# Patient Record
Sex: MALE | Race: WHITE
[De-identification: names, ages, dates, MRNs, and addresses within clinical notes are randomized per-mention and may not be internally consistent; named-entity substitution may affect disease eponyms.]

---

## 2017-01-09 ENCOUNTER — HOSPITAL ENCOUNTER (OUTPATIENT)
Dept: HOSPITAL 45 - C.LABSPEC | Age: 82
Discharge: HOME | End: 2017-01-09
Attending: INTERNAL MEDICINE
Payer: COMMERCIAL

## 2017-01-09 DIAGNOSIS — M25.469: Primary | ICD-10-CM

## 2017-01-09 LAB
APPEARANCE SNV: (no result)
COLOR SNV: (no result)
SYNOVIAL FLUID MONONUC RELAT: 8.1 %
SYNOVIAL FLUID POLYNUC RELAT: 91.9 %

## 2017-01-12 ENCOUNTER — HOSPITAL ENCOUNTER (OUTPATIENT)
Dept: HOSPITAL 45 - C.LAB1850 | Age: 82
Discharge: HOME | End: 2017-01-12
Attending: INTERNAL MEDICINE
Payer: COMMERCIAL

## 2017-01-12 DIAGNOSIS — N18.4: Primary | ICD-10-CM

## 2017-01-12 LAB
ANION GAP SERPL CALC-SCNC: 10 MMOL/L (ref 3–11)
APPEARANCE UR: CLEAR
B-OH-BUTYR SERPL-SCNC: 1.49 MG/DL (ref 0.2–2.81)
BILIRUB UR-MCNC: (no result) MG/DL
BUN SERPL-MCNC: 32 MG/DL (ref 7–18)
BUN/CREAT SERPL: 14.7 (ref 10–20)
CALCIUM SERPL-MCNC: 9.1 MG/DL (ref 8.5–10.1)
CHLORIDE SERPL-SCNC: 106 MMOL/L (ref 98–107)
CO2 SERPL-SCNC: 24 MMOL/L (ref 21–32)
COLOR UR: YELLOW
CREAT SERPL-MCNC: 2.2 MG/DL (ref 0.6–1.4)
CREAT UR-MCNC: 140 MG/DL
GLUCOSE SERPL-MCNC: 310 MG/DL (ref 70–99)
MAGNESIUM SERPL-MCNC: 1.9 MG/DL (ref 1.8–2.4)
MANUAL MICROSCOPIC REQUIRED?: NO
NITRITE UR QL STRIP: (no result)
PH UR STRIP: 5 [PH] (ref 4.5–7.5)
PHOSPHATE SERPL-MCNC: 3.4 MG/DL (ref 2.5–4.9)
POTASSIUM SERPL-SCNC: 4.5 MMOL/L (ref 3.5–5.1)
PROT UR STRIP-MCNC: 76.5 MG/DL (ref 0–11.9)
REVIEW REQ?: NO
SODIUM SERPL-SCNC: 140 MMOL/L (ref 136–145)
SP GR UR STRIP: 1.02 (ref 1–1.03)
URINE EPITHELIAL CELL AUTO: (no result) /LPF (ref 0–5)
URINE PROTIEN/CREAT RATIO: 0.6 (ref 0–0.2)
UROBILINOGEN UR-MCNC: (no result) MG/DL
ZZUR CULT IF INDIC CLEAN CATCH: NO

## 2017-01-20 ENCOUNTER — HOSPITAL ENCOUNTER (INPATIENT)
Dept: HOSPITAL 45 - C.EDB | Age: 82
LOS: 6 days | Discharge: HOME | DRG: 558 | End: 2017-01-26
Attending: FAMILY MEDICINE | Admitting: HOSPITALIST
Payer: COMMERCIAL

## 2017-01-20 VITALS — OXYGEN SATURATION: 93 %

## 2017-01-20 VITALS
SYSTOLIC BLOOD PRESSURE: 119 MMHG | DIASTOLIC BLOOD PRESSURE: 62 MMHG | OXYGEN SATURATION: 93 % | HEART RATE: 62 BPM | TEMPERATURE: 97.52 F

## 2017-01-20 VITALS — HEART RATE: 76 BPM | OXYGEN SATURATION: 93 %

## 2017-01-20 VITALS
OXYGEN SATURATION: 98 % | DIASTOLIC BLOOD PRESSURE: 54 MMHG | TEMPERATURE: 97.7 F | HEART RATE: 57 BPM | SYSTOLIC BLOOD PRESSURE: 108 MMHG

## 2017-01-20 VITALS
BODY MASS INDEX: 27.33 KG/M2 | WEIGHT: 190.92 LBS | BODY MASS INDEX: 27.33 KG/M2 | HEIGHT: 70 IN | WEIGHT: 190.92 LBS | HEIGHT: 70 IN

## 2017-01-20 DIAGNOSIS — N47.1: ICD-10-CM

## 2017-01-20 DIAGNOSIS — I12.9: ICD-10-CM

## 2017-01-20 DIAGNOSIS — Z82.49: ICD-10-CM

## 2017-01-20 DIAGNOSIS — D69.6: ICD-10-CM

## 2017-01-20 DIAGNOSIS — K21.9: ICD-10-CM

## 2017-01-20 DIAGNOSIS — N18.4: ICD-10-CM

## 2017-01-20 DIAGNOSIS — M25.062: ICD-10-CM

## 2017-01-20 DIAGNOSIS — E11.21: ICD-10-CM

## 2017-01-20 DIAGNOSIS — E83.42: ICD-10-CM

## 2017-01-20 DIAGNOSIS — M71.162: Primary | ICD-10-CM

## 2017-01-20 DIAGNOSIS — I25.2: ICD-10-CM

## 2017-01-20 DIAGNOSIS — I25.10: ICD-10-CM

## 2017-01-20 DIAGNOSIS — N40.1: ICD-10-CM

## 2017-01-20 DIAGNOSIS — L03.116: ICD-10-CM

## 2017-01-20 DIAGNOSIS — I24.8: ICD-10-CM

## 2017-01-20 DIAGNOSIS — D47.2: ICD-10-CM

## 2017-01-20 DIAGNOSIS — E78.00: ICD-10-CM

## 2017-01-20 DIAGNOSIS — M10.9: ICD-10-CM

## 2017-01-20 DIAGNOSIS — Z95.0: ICD-10-CM

## 2017-01-20 DIAGNOSIS — D46.9: ICD-10-CM

## 2017-01-20 DIAGNOSIS — Z95.1: ICD-10-CM

## 2017-01-20 LAB
ALP SERPL-CCNC: 97 U/L (ref 45–117)
ALT SERPL-CCNC: 15 U/L (ref 12–78)
ANION GAP SERPL CALC-SCNC: 16 MMOL/L (ref 3–11)
APPEARANCE UR: (no result)
AST SERPL-CCNC: 23 U/L (ref 15–37)
B-OH-BUTYR SERPL-SCNC: 1.97 MG/DL (ref 0.2–2.81)
BASOPHILS # BLD: 0.27 K/UL (ref 0–0.2)
BASOPHILS NFR BLD: 1.8 % (ref 0–2)
BILIRUB UR-MCNC: (no result) MG/DL
BUN SERPL-MCNC: 31 MG/DL (ref 7–18)
BUN/CREAT SERPL: 12.3 (ref 10–20)
CALCIUM SERPL-MCNC: 9.1 MG/DL (ref 8.5–10.1)
CHLORIDE SERPL-SCNC: 103 MMOL/L (ref 98–107)
CKMB/CK RATIO: 4.8 (ref 0–3)
CKMB/CK RATIO: 5.3 (ref 0–3)
CO2 SERPL-SCNC: 19 MMOL/L (ref 21–32)
COLOR UR: YELLOW
COMPLETE: YES
CREAT SERPL-MCNC: 2.5 MG/DL (ref 0.6–1.4)
EOSINOPHIL NFR BLD AUTO: 169 K/UL (ref 130–400)
FLUAV RNA SPEC QL NAA+PROBE: (no result)
FLUBV RNA SPEC QL NAA+PROBE: (no result)
GIANT PLATELETS BLD QL SMEAR: (no result)
GLUCOSE SERPL-MCNC: 409 MG/DL (ref 70–99)
HCT VFR BLD CALC: 36 % (ref 42–52)
INR PPP: 1.1 (ref 0.9–1.1)
LYMPHOCYTES # BLD: 1.08 K/UL (ref 1.2–3.4)
LYMPHOCYTES NFR BLD: 7.1 %
MAGNESIUM SERPL-MCNC: 1.7 MG/DL (ref 1.8–2.4)
MANUAL MICROSCOPIC REQUIRED?: NO
MCH RBC QN AUTO: 30.5 PG (ref 25–34)
MCHC RBC AUTO-ENTMCNC: 33.6 G/DL (ref 32–36)
MCV RBC AUTO: 90.7 FL (ref 80–100)
MYELOCYTES NFR BLD: 0.9 %
NEUTROPHILS NFR BLD AUTO: 48.2 %
NITRITE UR QL STRIP: (no result)
PARTIAL THROMBOPLASTIN RATIO: 0.8
PH UR STRIP: 5 [PH] (ref 4.5–7.5)
PMV BLD AUTO: 12 FL (ref 7.4–10.4)
POTASSIUM SERPL-SCNC: 4.3 MMOL/L (ref 3.5–5.1)
PROTHROMBIN TIME: 12.3 SECONDS (ref 9–12)
RBC # BLD AUTO: 3.97 M/UL (ref 4.7–6.1)
REVIEW REQ?: YES
SODIUM SERPL-SCNC: 138 MMOL/L (ref 136–145)
SP GR UR STRIP: 1.01 (ref 1–1.03)
SPHEROCYTES BLD QL SMEAR: (no result)
URINE EPITHELIAL CELL AUTO: >30 /LPF (ref 0–5)
URINE PATH CASTS: (no result) /LPF
UROBILINOGEN UR-MCNC: (no result) MG/DL
WBC # BLD AUTO: 15.2 K/UL (ref 4.8–10.8)

## 2017-01-20 RX ADMIN — INSULIN ASPART SCH UNITS: 100 INJECTION, SOLUTION INTRAVENOUS; SUBCUTANEOUS at 21:20

## 2017-01-20 RX ADMIN — INSULIN ASPART SCH UNITS: 100 INJECTION, SOLUTION INTRAVENOUS; SUBCUTANEOUS at 18:39

## 2017-01-20 RX ADMIN — GUAIFENESIN SCH MG: 200 TABLET ORAL at 19:59

## 2017-01-20 RX ADMIN — BUDESONIDE SCH MG: 0.5 SUSPENSION RESPIRATORY (INHALATION) at 19:02

## 2017-01-20 RX ADMIN — PIPERACILLIN AND TAZOBACTAM SCH MLS/HR: 3; .375 INJECTION, POWDER, LYOPHILIZED, FOR SOLUTION INTRAVENOUS; PARENTERAL at 19:51

## 2017-01-20 RX ADMIN — SODIUM CHLORIDE SCH MLS/HR: 900 INJECTION, SOLUTION INTRAVENOUS at 17:16

## 2017-01-20 RX ADMIN — LEVALBUTEROL SCH MG: 1.25 SOLUTION, CONCENTRATE RESPIRATORY (INHALATION) at 19:02

## 2017-01-20 RX ADMIN — INSULIN ASPART SCH UNITS: 100 INJECTION, SOLUTION INTRAVENOUS; SUBCUTANEOUS at 16:00

## 2017-01-20 RX ADMIN — DOCUSATE SODIUM SCH MG: 100 CAPSULE, LIQUID FILLED ORAL at 19:57

## 2017-01-20 RX ADMIN — MECLIZINE HYDROCHLORIDE SCH MG: 25 TABLET ORAL at 18:11

## 2017-01-20 RX ADMIN — HEPARIN SODIUM PRN MLS/HR: 5000 INJECTION, SOLUTION INTRAVENOUS at 19:32

## 2017-01-20 RX ADMIN — IPRATROPIUM BROMIDE SCH MG: 0.5 SOLUTION RESPIRATORY (INHALATION) at 19:02

## 2017-01-20 NOTE — CARDIOLOGY CONSULTATION
Cardiology Consultation


Date of Consultation:


Jan 20, 2017.


Requesting Physician:


Dr. Singletary


Reason for Consultation:


Elevated troponin


Pt evaluation today including:  conversation w/ patient, conversation w/ family

, physical exam, lab review, review of studies, review of inpatient medication 

list


History of Present Illness


This is an 81-year-old gentleman with a history of coronary artery disease 

including myocardial infarction in 1997 bypass surgery in 2008, stage IV kidney 

disease, diabetes, hyperlipidemia, sick sinus syndrome and a history of chest 

discomfort. His chest discomfort has been evaluated in the past and he has had 

elevated troponins in the past however with his kidney disease he has declined 

invasive evaluation although it has been offered. He has also had a GI bleed in 

May 2016.





He was hospitalized in September 2015 and at that time his troponin increased 

to a maximum of 2.4, in May 2016 his troponin was 0.068. He had an 

echocardiogram done 05/29/2016 which showed normal left ventricular size and 

function with concentric left ventricular hypertrophy. He presents now with 

symptoms of fatigue and was noted to have an elevated troponin. He is not 

having chest discomfort, he does have chronic difficulty with exertion which is 

becoming very short of breath with climbing steps or even minimal exertion. He 

does not think that is been changing recently but has changed over the last 

several years. He did use a nitroglycerin tablet for chest discomfort on the 

evening of 01/19/2017, he evidently hasn't use one for some time before that. 

That did relieve his chest discomfort. He feels that he is suffering from a 

"cold" and is having little more difficulty with his breathing than usual.





Past Medical/Surgical History


Medical Problems:


(1) Anemia


Status: Chronic  





(2) End stage renal disease


Status: Chronic  





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


Status: Chronic  





(4) Thrombocytopenia


Status: Chronic  





Surgical Problems:


(1) H/O heart bypass surgery


Status: Chronic  








Family History





Heart disease





Social History


Smoking Status:  Never Smoker


History of Alcohol Use:  No





Review of Systems


Constitutional:  + fatigue, No fever, No weakness, No weight loss


Respiratory:  + dyspnea on exertion, + see HPI, + wheezing, No cough, No 

shortness of breath


Cardiac:  + chest pain, + see HPI


Abdomen:  No GI bleeding, No diarrhea, No nausea, No pain, No vomiting


Male :  No nocturia more than once/night, No sexual dysfunction, No slowing 

stream, No urinary frequency


Neurologic:  No balance problems, No numbness/tingling, No paralysis, No 

weakness


Heme:  No abnormal bleeding/bruising, No clotting problems


Endo:  No fatigue


Skin:  No problem reported


All Other Systems:  Reviewed and Negative





Allergies


Coded Allergies:  


     No Known Allergies (Verified , 1/29/17)





Medications





 Current Inpatient Medications








 Medications


  (Trade)  Dose


 Ordered  Sig/[place]


 Route  Start Time


 Stop Time Status Last Admin


Dose Admin


 


 Atorvastatin


 Calcium


  (Lipitor Tab)  40 mg  DAILY


 PO  1/21/17 09:00


 2/20/17 08:59   


 


 


 Cholecalciferol


  (Vitamin D Tab)  1,000


 inter.unit  DAILY


 PO  1/21/17 09:00


 2/20/17 08:59   


 


 


 Clopidogrel


 Bisulfate


  (plAVix TAB)  75 mg  DAILY


 PO  1/21/17 09:00


 2/20/17 08:59   


 


 


 Cyanocobalamin


  (Vitamin B-12


 Tab)  1,000 mcg  DAILY


 PO  1/21/17 09:00


 2/20/17 08:59   


 


 


 Meclizine HCl 25


 mg  25 mg  Q6


 PO  1/20/17 18:00


 2/19/17 17:59   


 


 


 Pantoprazole


 Sodium/Syringe


  (Protonix Inj/


 Syringe)  10 ml @ 5


 mls/min  DAILY@11


 IV  1/21/17 11:00


 2/20/17 10:59   


 


 


 Enoxaparin Sodium


 30 mg  30 mg  QPM


 SC  1/20/17 21:00


 2/19/17 20:59   


 


 


 Sodium Chloride


  (Nss 1000ml)  1,000 ml @ 


 100 mls/hr  Q10H


 IV  1/20/17 15:12


 2/19/17 15:11   


 


 


 Acetaminophen


  (Tylenol Tab)  650 mg  Q4H  PRN


 PO  1/20/17 15:15


 2/19/17 15:14   


 


 


 Zolpidem Tartrate


  (Ambien Tab)  5 mg  HSZ  PRN


 PO  1/20/17 15:15


 2/19/17 15:14   


 


 


 Nitroglycerin


  (Nitrostat Tab)  0.4 mg  UD  PRN


 SL  1/20/17 15:15


 2/19/17 15:14   


 


 


 Lorazepam


  (Ativan Inj)  0.5 mg  Q4H  PRN


 IV  1/20/17 15:15


 2/19/17 15:14   


 


 


 Magnesium


 Hydroxide


  (Milk Of


 Magnesia Susp)  30 ml  Q6H  PRN


 PO  1/20/17 15:15


 2/19/17 15:14   


 


 


 Bisacodyl


  (Dulcolax Supp)  10 mg  DAILY  PRN


 FL  1/20/17 15:15


 2/19/17 15:14   


 


 


 Diphenhydramine


 HCl


  (Benadryl Inj)  25 mg  Q4H  PRN


 IV  1/20/17 15:15


 2/19/17 15:14   


 


 


 Al Hydrox/Mg


 Hydrox/


 Simethicone 15 ml  15 ml  Q4H  PRN


 PO  1/20/17 15:15


 2/19/17 15:14   


 


 


 Promethazine HCl/


 Sodium Chloride


  (Phenergan Inj/


 Nss 50ml)  50.5 ml @ 


 202 mls/hr  Q4H  PRN


 IV  1/20/17 15:15


 2/19/17 15:14   


 


 


 Ondansetron HCl


  (Zofran Inj)  4 mg  Q6H  PRN


 IV  1/20/17 15:15


 2/19/17 15:14   


 


 


 Docusate Sodium


  (coLACE CAP)  100 mg  BID


 PO  1/20/17 21:00


 2/19/17 20:59   


 


 


 Morphine Sulfate


  (MoRPHine


 SULFATE INJ)  2 mg  Q2H  PRN


 IV  1/20/17 15:15


 2/3/17 15:14   


 


 


 Insulin Aspart


  (novoLOG ASPART)  **SLIDING


 SCALE**


 **If C...  ACHS


 SC  1/20/17 16:00


 2/19/17 15:59   


 


 


 Glucose


  (Glucose 40% Gel)    UD  PRN


 PO  1/20/17 15:15


 2/19/17 15:14   


 


 


 Glucose


  (Glucose Chew


 Tab)  1 tabs  UD  PRN


 PO  1/20/17 15:15


 2/19/17 15:14   


 


 


 Dextrose


  (Dextrose 50%


 50ML Syringe)  50 ml  UD  PRN


 IV  1/20/17 15:15


 2/19/17 15:14   


 


 


 Glucagon


  (Glucagon Inj)  1 mg  UD  PRN


 SQ  1/20/17 15:15


 2/19/17 15:14   


 


 


 Insulin Glargine


 20 unit  20 unit  QAM


 SQ  1/21/17 09:00


 2/20/17 08:59   


 


 


 Piperacillin Sod/


 Tazobactam Sod


 3.375 gm/Dextrose  115 ml @ 


 28.75 mls/


 hr  Q8H


 IV  1/20/17 20:00


 1/27/17 19:59   


 


 


 Vancomycin HCl


 1750 mg/Sodium


 Chloride  535 ml @ 


 200 mls/hr  TODAY@1630  ONCE


 IV  1/20/17 16:30


 1/20/17 19:10   


 


 


 Levofloxacin/Prmx


  (Levaquin / D5W/


 Premixed D5W)  150 ml @ 


 100 mls/hr  Q2D@1400


 IV  1/22/17 14:00


 1/26/17 23:59   


 


 


 Guaifenesin


  (Organidin Nr


 Tab)  600 mg  BID


 PO  1/20/17 21:00


 2/19/17 20:59   


 


 


 Budesonide


  (Pulmicort


 Respules 0.5MG/


 2ML Neb Soln)  0.5 mg  BIDR


 INH  1/20/17 20:00


 2/19/17 19:59   


 


 


 Levofloxacin


  (Consult)  1 ea  UD  PRN


 N/A  1/20/17 16:15


 2/19/17 16:14   


 


 


 Piperacillin Sod/


 Tazobactam Sod


  (Consult)  1 ea  UD  PRN


 N/A  1/20/17 16:15


 2/19/17 16:14   


 


 


 Vancomycin HCl


  (Consult)  1 ea  UD  PRN


 N/A  1/20/17 16:15


 2/19/17 16:14   


 


 


 Ipratropium


 Bromide


  (Atrovent 0.02%


 0.5MG/2.5ML Neb)  0.5 mg  Q6R


 INH  1/20/17 21:00


 2/19/17 20:59   


 


 


 Levalbuterol


  (Xopenex 1.25MG/


 0.5ML Neb)  1.25 mg  Q6R


 INH  1/20/17 21:00


 2/19/17 20:59   


 











Physical Exam





 Vital Signs Past 12 Hours








  Date Time  Temp Pulse Resp B/P Pulse Ox O2 Delivery O2 Flow Rate FiO2


 


1/20/17 15:43  61 16 87/44 100 Room Air  


 


1/20/17 15:16  56 16 94/47 100 Room Air  


 


1/20/17 14:41  57 18 89/50 100 Room Air  


 


1/20/17 14:32  57 18 96/48 100 Room Air  


 


1/20/17 14:25  57      


 


1/20/17 14:21  62 18 96/45 100 Room Air  


 


1/20/17 13:57  55 16 103/48 100 Room Air  


 


1/20/17 13:42  58 18 100/48 100 Room Air  


 


1/20/17 13:20  61 18 95/50 100 Room Air  


 


1/20/17 13:10  65 18 82/45 100 Room Air  


 


1/20/17 12:55  66 18 96/47 94 Room Air  


 


1/20/17 12:44  68 18 82/46 100 Room Air  


 


1/20/17 12:44 36.8       


 


1/20/17 12:40  67      


 


1/20/17 12:35  68 18 99/52 100 Room Air  


 


1/20/17 12:24  77 18 55/39 94 Room Air  








Constitutional:  


   Level of Distress:  mild distress


Psychiatric:  


   Mental Status:  active & alert


Head:  normocephalic


Eyes:  


   EOM:  EOMI


ENMT:  normal ENT inspection, hearing grossly normal


Neck:  supple, no masses


Lungs:  


   Respiratory effort:  no dyspnea, good air movement


   Auscultation:  breath sounds normal, no wheezing


Cardiovascular:  


   Heart Auscultation:  RRR, no murmurs, no rubs, no gallops


Peripheral Pulses:  


   Bruits:  none appreciated


Abdomen:  


   Bowel Sounds:  normal


   Inspection & Palpation:  soft, no tenderness, guarding & rebound, no masses


Musculoskeletal:  normal strength (5/5 throughout)


Extremities:  no edema


Neurologic:  


   Cranial Nerves:  grossly intact


   Sensation:  grossly intact





Data


Laboratory Results:





Last 24 Hours








Test


  1/20/17


12:37 1/20/17


12:40 1/20/17


12:45 1/20/17


13:00


 


Bedside Glucose 427 mg/dl    


 


White Blood Count  15.20 K/uL   


 


Red Blood Count  3.97 M/uL   


 


Hemoglobin  12.1 g/dL   


 


Hematocrit  36.0 %   


 


Mean Corpuscular Volume  90.7 fL   


 


Mean Corpuscular Hemoglobin  30.5 pg   


 


Mean Corpuscular Hemoglobin


Concent 


  33.6 g/dl 


  


  


 


 


Platelet Count  169 K/uL   


 


Mean Platelet Volume  12.0 fL   


 


RDW Standard Deviation  65.2 fL   


 


RDW Coefficient of Variation  19.6 %   


 


Neutrophils % (Manual)  48.2 %   


 


Lymphocytes % (Manual)  7.1 %   


 


Monocytes % (Manual)  42.0 %   


 


Basophils % (Manual)  1.8 %   


 


Myelocytes %  0.9 %   


 


Neutrophils # (Manual)  7.33 K/uL   


 


Total Absolute Neutrophils  7.33 K/uL   


 


Lymphocytes # (Manual)  1.08 K/uL   


 


Total Absolute Lymphocytes  1.08 K/uL   


 


Monocytes # (Manual)  6.38 K/uL   


 


Basophils # (Manual)  0.27 K/uL   


 


Myelocytes #  0.14 K/uL   


 


Giant Platelets  1+   


 


Spherocytes  1+   


 


Prothrombin Time  12.3 SECONDS   


 


Prothromb Time International


Ratio 


  1.1 


  


  


 


 


Activated Partial


Thromboplast Time 


  20.8 SECONDS 


  


  


 


 


Partial Thromboplastin Ratio  0.8   


 


Sodium Level  138 mmol/L   


 


Potassium Level  4.3 mmol/L   


 


Chloride Level  103 mmol/L   


 


Carbon Dioxide Level  19 mmol/L   


 


Anion Gap  16.0 mmol/L   


 


Blood Urea Nitrogen  31 mg/dl   


 


Creatinine  2.50 mg/dl   


 


Estimated GFR (


American) 


  26.9 


  


  


 


 


Estimated GFR (Non-


American 


  23.2 


  


  


 


 


BUN/Creatinine Ratio  12.3   


 


Random Glucose  409 mg/dl   


 


Calcium Level  9.1 mg/dl   


 


Magnesium Level  1.7 mg/dl   


 


Total Bilirubin  0.6 mg/dl   


 


Direct Bilirubin  0.1 mg/dl   


 


Aspartate Amino Transf


(AST/SGOT) 


  23 U/L 


  


  


 


 


Alanine Aminotransferase


(ALT/SGPT) 


  15 U/L 


  


  


 


 


Alkaline Phosphatase  97 U/L   


 


Total Creatine Kinase  104 U/L   


 


Creatine Kinase MB  5.5 ng/ml   


 


Creatine Kinase MB Ratio  5.3   


 


Troponin I  1.070 ng/ml   


 


Total Protein  7.8 gm/dl   


 


Albumin  3.1 gm/dl   


 


Beta-Hydroxybutyric Acid  1.97 mg/dL   


 


Bedside Lactic Acid Venous   4.88 mmol/L  


 


Influenza Type A (RT-PCR)


  


  


  


  Neg for Influ


A


 


Influenza Type B (RT-PCR)


  


  


  


  Neg for Influ


B














Test


  1/20/17


14:56 1/20/17


15:12 1/20/17


15:58 1/20/17


16:47


 


Bedside Glucose 359 mg/dl   304 mg/dl  


 


Creatine Kinase MB Ratio     








EKG: His electrocardiogram shows sinus rhythm at 74 bpm, he has nonspecific ST-

T abnormalities but no findings of acute infarction. There appears to be an age-

indeterminate inferior and anterior myocardial infarction.





Telemetry reviewed:  Sinus rhythm with a controlled heart rate





Assessment & Plan


#1. Elevated cardiac enzymes: His troponin is slightly elevated, that could be 

from the chest discomfort he had yesterday, it could be due to demand ischemia 

due to his current pulmonary condition. He does not have anything to suggest an 

acute myocardial infarction now (no diagnostic electrocardiographic changes and 

no discomfort currently) therefore I would not consider invasive evaluation. I 

am going to start him on heparin and trend his cardiac enzymes. Depending on 

how things look tomorrow we can consider invasive evaluation although he is not 

wanted to do that in the past.





#2. Coronary disease: He has coronary artery disease, he has significant 

difficulty with exertion which could be an anginal equivalent although he 

typically does not have chest discomfort with exertion. He did of chest 

discomfort last night without exertion which could have been due to coronary 

disease. We may have to consider stress testing but only if we would plan on 

further evaluation. Certainly I would not do that now with his pulmonary 

condition.





#3. Chronic kidney disease: He has long-standing kidney disease and his 

creatinine is 2.5 today. That is roughly stable compared to prior values. This 

may interfere with our ability to perform invasive evaluation.





Thank you for allowing me to participate in his care.

## 2017-01-20 NOTE — EMERGENCY ROOM VISIT NOTE
History


Report prepared by Carol:  Kizzy Roman


Under the Supervision of:  Dr. Ada Escoto M.D.


First contact with patient:  12:43


Chief Complaint:  ILLNESS


Stated Complaint:  "HAS A COLD, CAN'T NAVIGATE" - REF BY 





History of Present Illness


The patient is a 81 year old male who presents to the Emergency Room with 

complaints of worsening weakness and shortness of breath over the past several 

days. He has had a decreased appetite. Upon arrival to the ED, he had an 

episode of diarrhea. He has not had his temperature taken and is unsure if he 

has had a fever. Per patient's family, the patient had his left knee aspirated 

several days ago. His family is unsure of why fluid was accumulating. A couple 

days later, he had a fall against a chair. He still complains of left knee 

pain. His wife notes that his knee does look much better than it did prior tot 

he aspiration. He is currently on antibiotics. The patient has a history of 

heart disease and diabetes. He is on insulin and reports that he took his 

Lantus this morning, although his wife questions his compliance with taking his 

insulin.  He does not have a history of lung disease and has no history of 

smoking. Denies vomiting or other complaints.





   Source of History:  patient, spouse/significant other


   Onset:  several days PTA


   Position:  other ([employer])


   Timing:  worsening


   Associated Symptoms:  + diarrhea, No vomiting


Note:


Other symptoms: left knee pain, decreased appetite





Review of Systems


See HPI for pertinent positives & negatives. A total of 10 systems reviewed and 

were otherwise negative.





Past Medical & Surgical


Medical Problems:


(1) ACS (acute coronary syndrome)


(2) Acute kidney injury superimposed on chronic kidney disease


(3) Anemia


(4) Angina at rest


(5) Bradycardia


(6) CKD (chronic kidney disease), stage IV


(7) End stage renal disease


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


(9) NSTEMI, initial episode of care


(10) Precordial chest pain


(11) Thrombocytopenia


Surgical Problems:


(1) H/O heart bypass surgery








Family History





Heart disease





Social History


Smoking Status:  Never Smoker


Drug Use:  none


Marital Status:  


Housing Status:  lives with significant other


Occupation Status:  retired





Current/Historical Medications


Scheduled


Amlodipine (Norvasc), 10 MG PO DAILY


Atorvastatin (Lipitor), 40 MG PO DAILY


Cholecalciferol (Vitamin D3), 1 TAB PO DAILY


Clopidogrel (Plavix), 75 MG PO DAILY


Cyanocobalamin (Vitamin B-12 1000 Mcg), 1,000 MCG PO DAILY


Doxycycline Hyclate (Vibramycin), 100 MG PO Q12H


Insulin Glargine (Lantus Solostar), 18-20 UNITS SQ QAM


Insulin Lispro (Human) (Humalog Kwikpen), 8 UNITS SC TIDM


Isosorbide Mononitrate Ext Rel (Imdur Ext Rel), 120 MG PO QAM


Meclizine HCl (Meclizine HCl), 25 MG PO Q6H


Pantoprazole (Protonix), 40 MG PO DAILY





Scheduled PRN


Furosemide (Furosemide), 20 MG PO DAILY PRN for Fluid Retention


Nitroglycerin (Nitrostat), 0.4 MG SL UD PRN for Chest Pain





Allergies


Coded Allergies:  


     No Known Allergies (Verified , 1/20/17)





Physical Exam


Vital Signs











  Date Time  Temp Pulse Resp B/P Pulse Ox O2 Delivery O2 Flow Rate FiO2


 


1/20/17 15:16  56 16 94/47 100 Room Air  


 


1/20/17 14:41  57 18 89/50 100 Room Air  


 


1/20/17 14:32  57 18 96/48 100 Room Air  


 


1/20/17 14:25  57      


 


1/20/17 14:21  62 18 96/45 100 Room Air  


 


1/20/17 13:57  55 16 103/48 100 Room Air  


 


1/20/17 13:42  58 18 100/48 100 Room Air  


 


1/20/17 13:20  61 18 95/50 100 Room Air  


 


1/20/17 13:10  65 18 82/45 100 Room Air  


 


1/20/17 12:55  66 18 96/47 94 Room Air  


 


1/20/17 12:44  68 18 82/46 100 Room Air  


 


1/20/17 12:44 36.8       


 


1/20/17 12:40  67      


 


1/20/17 12:35  68 18 99/52 100 Room Air  


 


1/20/17 12:24  77 18 55/39 94 Room Air  











Physical Exam


Vital signs reviewed.





General: Elderly, chronically ill-appearing 81 year old male, in no significant 

distress.





HEENT: No scleral icterus, PERRLA, neck supple.  Atraumatic.





Cardiovascular: Regular rate and rhythm, no extra sounds.





Pulmonary: Coarse breath sounds to auscultation bilaterally, normal work of 

breathing.





Abdomen: Soft, nontender, nondistended, positive bowel sounds.





Musculoskeletal: Swelling to the left knee positive fluctuance and erythema, 

minimally warm.  No significant peripheral edema otherwise.





Neurologic: Patient is somnolent, but arouses to verbal stimuli, oriented x 3.  

Follows commands.





Skin: Warm, dry, no rash





Medical Decision & Procedures


ER Provider


Diagnostic Interpretation:


X-ray results as stated below per interpretation by me and the radiologist: 








SINGLE VIEW CHEST





CLINICAL HISTORY:  Cough. Hypotension.





FINDINGS: An AP, portable, upright chest radiograph is compared to study dated


5/31/2016 and correlated with chest CT dated 4/28/2014. The examination is


degraded by portable technique and patient rotation.  The patient is status post


midline sternotomy. The heart is mildly enlarged and there is atherosclerotic


calcification of the thoracic aorta. The pulmonary vasculature is noncongested.


Chronic elevation of the right hemidiaphragm with right basilar atelectasis is


similar to previous. No airspace consolidation is seen typical for pneumonia and


there is no large pleural effusion. Emphysema is suspected. No pneumothorax is


seen. The skeletal structures are osteopenic. The bony thorax is grossly intact.





IMPRESSION: Cardiomegaly with no acute cardiopulmonary abnormality. Chronic


changes are detailed above.











Electronically signed by:  Juan Pablo Ellis M.D.


1/20/2017 1:34 PM





Dictated Date/Time:  1/20/2017 1:32 PM

















LEFT KNEE 1 OR 2 VIEWS ROUTINE





CLINICAL HISTORY: Left knee pain and swelling.    





COMPARISON: None





FINDINGS:  Extensive vascular calcification is noted. No acute fracture is


present. No joint effusion is identified. There is marked prepatellar soft


tissue swelling. Soft tissue calcifications are noted. There is an indeterminate


4 mm radiodensity anterior to the medial tibial plateau.





IMPRESSION: 





1. No acute fracture.





2. Marked prepatellar and infrapatellar soft tissue swelling. This is


nonspecific but could reflect bursitis or contusion.











Electronically signed by:  Leoncio Briggs M.D.


1/20/2017 3:11 PM





Dictated Date/Time:  1/20/2017 3:10 PM





Laboratory Results


1/20/17 12:40








Red Blood Count 3.97, Mean Corpuscular Volume 90.7, Mean Corpuscular Hemoglobin 

30.5, Mean Corpuscular Hemoglobin Concent 33.6, Mean Platelet Volume 12.0





1/20/17 12:40

















Test


  1/20/17


12:40 1/20/17


12:45 1/20/17


13:00


 


White Blood Count


  15.20 K/uL


(4.8-10.8) 


  


 


 


Red Blood Count


  3.97 M/uL


(4.7-6.1) 


  


 


 


Hemoglobin


  12.1 g/dL


(14.0-18.0) 


  


 


 


Hematocrit 36.0 % (42-52)   


 


Mean Corpuscular Volume


  90.7 fL


() 


  


 


 


Mean Corpuscular Hemoglobin


  30.5 pg


(25-34) 


  


 


 


Mean Corpuscular Hemoglobin


Concent 33.6 g/dl


(32-36) 


  


 


 


Platelet Count


  169 K/uL


(130-400) 


  


 


 


Mean Platelet Volume


  12.0 fL


(7.4-10.4) 


  


 


 


RDW Standard Deviation


  65.2 fL


(36.4-46.3) 


  


 


 


RDW Coefficient of Variation


  19.6 %


(11.5-14.5) 


  


 


 


Neutrophils % (Manual) 48.2 %   


 


Lymphocytes % (Manual) 7.1 %   


 


Monocytes % (Manual) 42.0 %   


 


Basophils % (Manual) 1.8 % (0-2)   


 


Myelocytes % 0.9 %   


 


Neutrophils # (Manual)


  7.33 K/uL


(1.4-6.5) 


  


 


 


Total Absolute Neutrophils


  7.33 K/uL


(1.4-6.5) 


  


 


 


Lymphocytes # (Manual)


  1.08 K/uL


(1.2-3.4) 


  


 


 


Total Absolute Lymphocytes


  1.08 K/uL


(1.2-3.4) 


  


 


 


Monocytes # (Manual)


  6.38 K/uL


(0.11-0.59) 


  


 


 


Basophils # (Manual)


  0.27 K/uL


(0-0.2) 


  


 


 


Myelocytes #


  0.14 K/uL


(0-0) 


  


 


 


Giant Platelets 1+   


 


Spherocytes 1+   


 


Prothrombin Time


  12.3 SECONDS


(9.0-12.0) 


  


 


 


Prothromb Time International


Ratio 1.1 (0.9-1.1) 


  


  


 


 


Activated Partial


Thromboplast Time 20.8 SECONDS


(21.0-31.0) 


  


 


 


Partial Thromboplastin Ratio 0.8   


 


Anion Gap


  16.0 mmol/L


(3-11) 


  


 


 


Estimated GFR (


American) 26.9 


  


  


 


 


Estimated GFR (Non-


American 23.2 


  


  


 


 


BUN/Creatinine Ratio 12.3 (10-20)   


 


Calcium Level


  9.1 mg/dl


(8.5-10.1) 


  


 


 


Magnesium Level


  1.7 mg/dl


(1.8-2.4) 


  


 


 


Total Bilirubin


  0.6 mg/dl


(0.2-1) 


  


 


 


Direct Bilirubin


  0.1 mg/dl


(0-0.2) 


  


 


 


Aspartate Amino Transf


(AST/SGOT) 23 U/L (15-37) 


  


  


 


 


Alanine Aminotransferase


(ALT/SGPT) 15 U/L (12-78) 


  


  


 


 


Alkaline Phosphatase


  97 U/L


() 


  


 


 


Total Protein


  7.8 gm/dl


(6.4-8.2) 


  


 


 


Albumin


  3.1 gm/dl


(3.4-5.0) 


  


 


 


Beta-Hydroxybutyric Acid


  1.97 mg/dL


(0.2-2.81) 


  


 


 


Bedside Lactic Acid Venous


  


  4.88 mmol/L


(0.90-1.70) 


 


 


Influenza Type A (RT-PCR)


  


  


  Neg for Influ


A (NEG)


 


Influenza Type B (RT-PCR)


  


  


  Neg for Influ


B (NEG)





Laboratory results per my review.





Medications Administered











 Medications


  (Trade)  Dose


 Ordered  Sig/Mariposa


 Route  Start Time


 Stop Time Status Last Admin


Dose Admin


 


 Sodium Chloride


  (Nss 500ml)  500 ml @ 


 999 mls/hr  Q31M STAT


 IV  1/20/17 12:53


 1/20/17 13:23 DC 1/20/17 12:53


999 MLS/HR


 


 Miscellaneous


  (Insulin


 Protocol Hhs Goal


 Range)  1 ea  ONE  ONCE


 N/A  1/20/17 13:30


 1/20/17 13:31 DC 1/20/17 17:15


1 EA


 


 Miscellaneous


  (Insulin


 Protocol Severe


 Stress)  1 ea  ONE  ONCE


 N/A  1/20/17 13:30


 1/20/17 13:31 DC 1/20/17 17:15


1 EA


 


 Piperacillin Sod/


 Tazobactam Sod


  (Zosyn Iv)  4.5 gm  NOW  STAT


 IV  1/20/17 13:19


 1/20/17 13:24 DC 1/20/17 13:35


4.5 GM


 


 Levofloxacin 750


 mg  750 mg  NOW  ONCE


 IV  1/20/17 13:30


 1/20/17 13:31 DC 1/20/17 14:08


750 MG


 


 Insulin Human


 Regular 3.5 unit/


 Syringe  3.5 ml @ 


 21 mls/min  NOW  ONCE


 IV  1/20/17 13:45


 1/20/17 13:46 DC 1/20/17 13:55


21 MLS/MIN


 


 Insulin Human


 Regular 250 units/


 Sodium Chloride  252.5 ml @ 


 0 mls/hr  DAILY@1130


 IV  1/20/17 13:45


 1/20/17 16:29 DC 1/20/17 13:57


3.3 MLS/HR


 


 Sodium Chloride


  (Nss 1000ml)  1,000 ml @ 


 100 mls/hr  Q10H


 IV  1/20/17 15:12


 2/19/17 15:11  1/20/17 17:16


100 MLS/HR











ECG


Indication:  weakness


Rate (beats per minute):  74


Rhythm:  sinus rhythm


Findings:  1st degree AV block, no acute ischemic change, no ectopy, other (

likely previous anterior and inferior infarct, poor quality baseline for 

interpretation)





ED Course


1243: Past medical records reviewed. The patient was evaluated in room A1. A 

complete history and physical examination was performed. 





1253: Ordered  ml @ 999 mls/hr IV. 





1319: I reassessed the patient. He was resting comfortably. Ordered NSS 1000 ml 

@ 200 mls/hr IV, Zosyn 4.5 gm IV, Insulin Human regular 1 ea, Levofloxacin 750 

mg IV, Insulin protocol severe stress 1 ea, Insulin protocol Hhs goal range 1 

ea.





1345: Ordered Glucagon 1 mg SQ, Dextrose 50 ml IV, Glucose chew tab 1 tab PO, 

Glucose 40% gel PO, Insulin Human Regular 250 units/Sodium Chloride 252.5 ml @ 

0 mls/hr IV, Insulin Human Regular 3.5 unit/Syringe 3.5 ml @ 21 mls/min IV, 

Insulin Aspart sliding scale SC. 





1409: I reassessed the patient. His blood pressure was up over 100. I discussed 

the treatment plan with the patient, his wife, and his daughter. They 

verbalized agreement. 





1414: I discussed the case with Dr. Singletary - [facility]. The 

patient will be evaluated for further management. 





1420: Dr. Singletary was at the patient's bedside.





Medical Decision


Differential diagnosis:


Etiologies such as benign positional vertigo, dehydration, hypovolemia, anemia, 

tumor, infection, hypoglycemia, electrolyte abnormalities, cardiac sources, 

intracerebral event, toxicologic, neurologic, as well as others were 

entertained.





This patient was evaluated and appeared to be in no significant distress.  

Patient states he is tired and weak.  He was hydrated with normal saline 

solution and noted to be markedly hypotensive.  Patient's lactate is elevated.  

Blood cultures were obtained.  Patient was medicated with IV Zosyn and Levaquin 

due to his cough.  The left knee does not appear to be septic.  Previous 

aspirate from the knee was reviewed and appears to be mostly blood.  Patient's 

blood pressure did improve.  Patient was started on an IV insulin infusion 

secondary to a hyperglycemia of 408.  Patient's troponin is positive.  EKG 

reveals a first-degree AV block without evidence of acute ischemic change to my 

review.  His vital signs stabilized.  His case was discussed with the 

hospitalist service, Dr. Pappas who will evaluate the patient for further 

management.  He was made aware of the findings and plan and agrees.





Consults


Time Called:  1400


Consulting Physician:  Dr. Singletary - [facility]


Returned Call:  1414


I discussed the case with him. The patient will be evaluated for further 

management.





Impression





 Primary Impression:  


 Cardiac ischemia


 Additional Impressions:  


 Sepsis


 Weakness





Critical Care


I have personally spent greater than 30 minutes of critical care time in the 

direct management of this patient.  This includes bedside care, interpretation 

of diagnostic studies, and testing, discussion with consultants, patient, and 

family members, and other required patient management activities.  This 30 

minutes is in excess of all separately billable procedures.





Scribe Attestation


The scribe's documentation has been prepared under my direction and personally 

reviewed by me in its entirety. I confirm that the note above accurately 

reflects all work, treatment, procedures, and medical decision making performed 

by me.





Departure Information


Dispostion


Being Evaluated By Hospitalist





Referrals


Isai Lucas M.D. (PCP)





Patient Instructions


My [facility]





Problem Qualifiers








 Additional Impressions:  


 Sepsis


 Sepsis type:  sepsis due to unspecified organism  Qualified Codes:  A41.9 - 

Sepsis, unspecified organism

## 2017-01-20 NOTE — NEPHROLOGY CONSULTATION
Nephrology Consultation


Date & Providers


Date of Consultation:  Jan 20, 2017.





Primary Care Provider:  Isai Lucas M.D.





Referring Provider:





Reason for Consultation


Chronic renal insufficiency





History of Present Illness


Mr. Chase Hummel is an 80-year-old male with atherosclerotic coronary artery 

disease, diabetes mellitus, hypertension, dyslipidemia, monoclonal gammopathy 

with chronic anemia and thrombocytopenia and chronic kidney disease IV.  I have 

followed Mr. Hummel for CKD in the nephrology clinic since September 2016.  

Renal function stable on assessment earlier this month with a serum creatinine 

of 2.2 mg/dL.  It is noted that he was hyperglycemic at that time in the 

setting of oral steroids for a prepatellar bursitis.





Mr. Hummel was hospitalized at [facility] from September 22nd 

- 25th with NSTEMI and lower GI bleed. He received medical management including 

transfusion support. Antiplatelet agents were held and due to persistent 

bradycardia metoprolol was decreased. He was discharged on twice daily 

pantoprazole. He continued to experience stable angina with intermittent chest 

heaviness as well as dyspnea with what he describes as moderate activity. He 

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 

wood shop and [employer] and was reporting appropriate activity tolerance 

following hospitalization.  He did have stable angina with chest pain 

responsive to nitroglycerin.





 More recently, Mr. Hummel has been suffering from knee pain.  His left knee is 

swollen and tender.  The bursa was aspirated on January 9.  Bloody fluid with 

minimal WBCs and negative culture was obtained.  No crystals were appreciated.  

Doxycycline and methylprednisolone were started.  Mr. Hummel developed 

persistent hyperglycemia with the medications.  He was not taking any NSAIDs 

for pain.  He was maintained on a course of doxycylcine for suspected septic 

bursitis.





He presented to the ED today with progressive weakness and dyspnea.  Poor 

appetite and one episode of loose stool were also reported.  





He has baseline occasional lightheadedness or dizziness but no syncopal 

episodes. He suffers from chronic orthostasis. Lightheadedness and dizziness 

has been present for several years and is unchanged. He has not had any recent 

change in weight. He has chronic resistant hypertension which has not been well 

controlled.





Mr. Hummel multiple chronic urinary complaints including frequency, nocturia, 

hesitancy and weak urinary stream. He has not noted any recent change in his 

urine output. He denies any hematuria or foamy urine. He has occasional 

postvoiding incontinence denies denies any significant stress or urge 

incontinence. 





Initial laboratory studies show a slight rise in creatinine.  Metabolic profile 

otherwise within normal limits.  Hyperglycemia noted.  CXR documented 

cardiomegaly without significant PVC or interstitial changes.  EKG showing 

sinus rhythm with first degree AV block and anterior and inferior ST changes.  

Alvarez catheter was unable to be placed due to phimosis.  Blood pressure was low 

on presentation.  Normal saline infusion x 1 liter was administered.





Past Medical/Surgical History


Medical:


Anemia (285.9)


Arteriosclerotic cardiovascular disease (ASCVD) (429.2,440.9)


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


Chronic kidney disease, stage IV (severe) (585.4)


Diabetes mellitus (250.00)


Asymmetrical sensorineural hearing loss (389.16)


Hyperlipidemia (272.4)


Hypertension (401.9)


Monoclonal gammopathy of undetermined significance (273.1)


Prepatellar bursitis (726.65)


Thrombocytopenia (287.5)


Surgical:


CABG








Allergies


Coded Allergies:  


     No Known Allergies (Verified , 1/20/17)





Inpatient Medications





 Current Inpatient Medications








 Medications


  (Trade)  Dose


 Ordered  Sig/Mariposa


 Route  Start Time


 Stop Time Status Last Admin


Dose Admin


 


 Atorvastatin


 Calcium


  (Lipitor Tab)  40 mg  DAILY


 PO  1/21/17 09:00


 2/20/17 08:59   


 


 


 Cholecalciferol


  (Vitamin D Tab)  1,000


 inter.unit  DAILY


 PO  1/21/17 09:00


 2/20/17 08:59   


 


 


 Clopidogrel


 Bisulfate


  (plAVix TAB)  75 mg  DAILY


 PO  1/21/17 09:00


 2/20/17 08:59   


 


 


 Cyanocobalamin


  (Vitamin B-12


 Tab)  1,000 mcg  DAILY


 PO  1/21/17 09:00


 2/20/17 08:59   


 


 


 Meclizine HCl 25


 mg  25 mg  Q6


 PO  1/20/17 18:00


 2/19/17 17:59   


 


 


 Pantoprazole


 Sodium/Syringe


  (Protonix Inj/


 Syringe)  10 ml @ 5


 mls/min  DAILY@11


 IV  1/21/17 11:00


 2/20/17 10:59   


 


 


 Enoxaparin Sodium


 30 mg  30 mg  QPM


 SC  1/20/17 21:00


 2/19/17 20:59   


 


 


 Sodium Chloride


  (Nss 1000ml)  1,000 ml @ 


 100 mls/hr  Q10H


 IV  1/20/17 15:12


 2/19/17 15:11   


 


 


 Acetaminophen


  (Tylenol Tab)  650 mg  Q4H  PRN


 PO  1/20/17 15:15


 2/19/17 15:14   


 


 


 Zolpidem Tartrate


  (Ambien Tab)  5 mg  HSZ  PRN


 PO  1/20/17 15:15


 2/19/17 15:14   


 


 


 Nitroglycerin


  (Nitrostat Tab)  0.4 mg  UD  PRN


 SL  1/20/17 15:15


 2/19/17 15:14   


 


 


 Lorazepam


  (Ativan Inj)  0.5 mg  Q4H  PRN


 IV  1/20/17 15:15


 2/19/17 15:14   


 


 


 Magnesium


 Hydroxide


  (Milk Of


 Magnesia Susp)  30 ml  Q6H  PRN


 PO  1/20/17 15:15


 2/19/17 15:14   


 


 


 Bisacodyl


  (Dulcolax Supp)  10 mg  DAILY  PRN


 [place]  1/20/17 15:15


 2/19/17 15:14   


 


 


 Diphenhydramine


 HCl


  (Benadryl Inj)  25 mg  Q4H  PRN


 IV  1/20/17 15:15


 2/19/17 15:14   


 


 


 Al Hydrox/Mg


 Hydrox/


 Simethicone 15 ml  15 ml  Q4H  PRN


 PO  1/20/17 15:15


 2/19/17 15:14   


 


 


 Promethazine HCl/


 Sodium Chloride


  (Phenergan Inj/


 Nss 50ml)  50.5 ml @ 


 202 mls/hr  Q4H  PRN


 IV  1/20/17 15:15


 2/19/17 15:14   


 


 


 Ondansetron HCl


  (Zofran Inj)  4 mg  Q6H  PRN


 IV  1/20/17 15:15


 2/19/17 15:14   


 


 


 Docusate Sodium


  (coLACE CAP)  100 mg  BID


 PO  1/20/17 21:00


 2/19/17 20:59   


 


 


 Morphine Sulfate


  (MoRPHine


 SULFATE INJ)  2 mg  Q2H  PRN


 IV  1/20/17 15:15


 2/3/17 15:14   


 


 


 Insulin Aspart


  (novoLOG ASPART)  **SLIDING


 SCALE**


 **If C...  ACHS


 SC  1/20/17 16:00


 2/19/17 15:59   


 


 


 Glucose


  (Glucose 40% Gel)    UD  PRN


 PO  1/20/17 15:15


 2/19/17 15:14   


 


 


 Glucose


  (Glucose Chew


 Tab)  1 tabs  UD  PRN


 PO  1/20/17 15:15


 2/19/17 15:14   


 


 


 Dextrose


  (Dextrose 50%


 50ML Syringe)  50 ml  UD  PRN


 IV  1/20/17 15:15


 2/19/17 15:14   


 


 


 Glucagon


  (Glucagon Inj)  1 mg  UD  PRN


 SQ  1/20/17 15:15


 2/19/17 15:14   


 


 


 Insulin Glargine


 20 unit  20 unit  QAM


 SQ  1/21/17 09:00


 2/20/17 08:59   


 


 


 Piperacillin Sod/


 Tazobactam Sod


 3.375 gm/Dextrose  115 ml @ 


 28.75 mls/


 hr  Q8H


 IV  1/20/17 20:00


 1/27/17 19:59   


 


 


 Vancomycin HCl


 1750 mg/Sodium


 Chloride  535 ml @ 


 200 mls/hr  TODAY@1630  ONCE


 IV  1/20/17 16:30


 1/20/17 19:10   


 


 


 Levofloxacin/Prmx


  (Levaquin / D5W/


 Premixed D5W)  150 ml @ 


 100 mls/hr  Q2D@1400


 IV  1/22/17 14:00


 1/26/17 23:59   


 


 


 Guaifenesin


  (Organidin Nr


 Tab)  600 mg  BID


 PO  1/20/17 21:00


 2/19/17 20:59   


 


 


 Budesonide


  (Pulmicort


 Respules 0.5MG/


 2ML Neb Soln)  0.5 mg  BIDR


 INH  1/20/17 20:00


 2/19/17 19:59   


 


 


 Levofloxacin


  (Consult)  1 ea  UD  PRN


 N/A  1/20/17 16:15


 2/19/17 16:14   


 


 


 Piperacillin Sod/


 Tazobactam Sod


  (Consult)  1 ea  UD  PRN


 N/A  1/20/17 16:15


 2/19/17 16:14   


 


 


 Vancomycin HCl


  (Consult)  1 ea  UD  PRN


 N/A  1/20/17 16:15


 2/19/17 16:14   


 


 


 Ipratropium


 Bromide


  (Atrovent 0.02%


 0.5MG/2.5ML Neb)  0.5 mg  Q6R


 INH  1/20/17 21:00


 2/19/17 20:59   


 


 


 Levalbuterol


  (Xopenex 1.25MG/


 0.5ML Neb)  1.25 mg  Q6R


 INH  1/20/17 21:00


 2/19/17 20:59   


 











Family History





Heart disease





Social History


Smoking Status:  Never Smoker


Drug Use:  none


Marital Status:  


Housing Status:  lives with family


Occupation:  retired





Review of Systems


A complete review of systems was performed.  Pertinent positives are noted 

above. All other systems are negative.





Physical Exam











  Date Time  Temp Pulse Resp B/P Pulse Ox O2 Delivery O2 Flow Rate FiO2


 


1/20/17 15:43  61 16 87/44 100 Room Air  


 


1/20/17 15:16  56 16 94/47 100 Room Air  


 


1/20/17 14:41  57 18 89/50 100 Room Air  


 


1/20/17 14:32  57 18 96/48 100 Room Air  


 


1/20/17 14:25  57      


 


1/20/17 14:21  62 18 96/45 100 Room Air  


 


1/20/17 13:57  55 16 103/48 100 Room Air  


 


1/20/17 13:42  58 18 100/48 100 Room Air  


 


1/20/17 13:20  61 18 95/50 100 Room Air  


 


1/20/17 13:10  65 18 82/45 100 Room Air  


 


1/20/17 12:55  66 18 96/47 94 Room Air  


 


1/20/17 12:44  68 18 82/46 100 Room Air  


 


1/20/17 12:44 36.8       


 


1/20/17 12:40  67      


 


1/20/17 12:35  68 18 99/52 100 Room Air  


 


1/20/17 12:24  77 18 55/39 94 Room Air  








General Appearance:  WD/WN, no apparent distress


Head:  normocephalic, atraumatic


Eyes:  normal inspection, sclerae normal


ENT:  normal ENT inspection, + pertinent finding (oral mucosa slightly dry)


Neck:  supple, no JVD


Respiratory/Chest:  lungs clear, no respiratory distress, no accessory muscle 

use


Cardiovascular:  regular rate, rhythm, no gallop


Abdomen/GI:  non tender, soft


Back:  normal inspection, no muscle spasm


Extremities/Musculoskelatal:  normal inspection, no pedal edema


Neurologic/Psych:  alert, oriented x 3


Skin:  normal color





Laboratory Results





Last 24 Hours








Test


  1/20/17


12:37 1/20/17


12:40 1/20/17


12:45 1/20/17


13:00


 


Bedside Glucose 427 mg/dl    


 


White Blood Count  15.20 K/uL   


 


Red Blood Count  3.97 M/uL   


 


Hemoglobin  12.1 g/dL   


 


Hematocrit  36.0 %   


 


Mean Corpuscular Volume  90.7 fL   


 


Mean Corpuscular Hemoglobin  30.5 pg   


 


Mean Corpuscular Hemoglobin


Concent 


  33.6 g/dl 


  


  


 


 


Platelet Count  169 K/uL   


 


Mean Platelet Volume  12.0 fL   


 


RDW Standard Deviation  65.2 fL   


 


RDW Coefficient of Variation  19.6 %   


 


Neutrophils % (Manual)  48.2 %   


 


Lymphocytes % (Manual)  7.1 %   


 


Monocytes % (Manual)  42.0 %   


 


Basophils % (Manual)  1.8 %   


 


Myelocytes %  0.9 %   


 


Neutrophils # (Manual)  7.33 K/uL   


 


Total Absolute Neutrophils  7.33 K/uL   


 


Lymphocytes # (Manual)  1.08 K/uL   


 


Total Absolute Lymphocytes  1.08 K/uL   


 


Monocytes # (Manual)  6.38 K/uL   


 


Basophils # (Manual)  0.27 K/uL   


 


Myelocytes #  0.14 K/uL   


 


Giant Platelets  1+   


 


Spherocytes  1+   


 


Prothrombin Time  12.3 SECONDS   


 


Prothromb Time International


Ratio 


  1.1 


  


  


 


 


Activated Partial


Thromboplast Time 


  20.8 SECONDS 


  


  


 


 


Partial Thromboplastin Ratio  0.8   


 


Sodium Level  138 mmol/L   


 


Potassium Level  4.3 mmol/L   


 


Chloride Level  103 mmol/L   


 


Carbon Dioxide Level  19 mmol/L   


 


Anion Gap  16.0 mmol/L   


 


Blood Urea Nitrogen  31 mg/dl   


 


Creatinine  2.50 mg/dl   


 


Estimated GFR (


American) 


  26.9 


  


  


 


 


Estimated GFR (Non-


American 


  23.2 


  


  


 


 


BUN/Creatinine Ratio  12.3   


 


Random Glucose  409 mg/dl   


 


Calcium Level  9.1 mg/dl   


 


Magnesium Level  1.7 mg/dl   


 


Total Bilirubin  0.6 mg/dl   


 


Direct Bilirubin  0.1 mg/dl   


 


Aspartate Amino Transf


(AST/SGOT) 


  23 U/L 


  


  


 


 


Alanine Aminotransferase


(ALT/SGPT) 


  15 U/L 


  


  


 


 


Alkaline Phosphatase  97 U/L   


 


Total Creatine Kinase  104 U/L   


 


Creatine Kinase MB  5.5 ng/ml   


 


Creatine Kinase MB Ratio  5.3   


 


Troponin I  1.070 ng/ml   


 


Total Protein  7.8 gm/dl   


 


Albumin  3.1 gm/dl   


 


Beta-Hydroxybutyric Acid  1.97 mg/dL   


 


Bedside Lactic Acid Venous   4.88 mmol/L  


 


Influenza Type A (RT-PCR)


  


  


  


  Neg for Influ


A


 


Influenza Type B (RT-PCR)


  


  


  


  Neg for Influ


B














Test


  1/20/17


14:56 1/20/17


15:12 1/20/17


15:58 1/20/17


16:47


 


Bedside Glucose 359 mg/dl   304 mg/dl  


 


Creatine Kinase MB Ratio     











Impression





(1) CKD (chronic kidney disease), stage IV


(2) Acute kidney injury superimposed on chronic kidney disease


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


(4) ACS (acute coronary syndrome)


Mr. Chase Hummel is an 80-year-old male with atherosclerotic coronary artery 

disease, diabetes mellitus, hypertension, dyslipidemia, monoclonal gammopathy 

with chronic anemia and thrombocytopenia and chronic kidney disease IV.   

Baseline creatinine ~2.0-2.2 mg/dL.  He has stated that he would not consider 

hemodialysis during prior discussions.  He has an extensive history of 

cardiovascular disease.  He presented to the ED today with hypotension, 

hypoglycemia, generalized weakness, shortness of breath.  He has been 

struggling recently with septic bursitis.  He was admitted with ACS and acute 

on chronic renal insufficiency in addition to sepsis and hyperglycemia.





Recommendations


Acute renal insufficiency:


-- Suspect prerenal in the setting of hyperglycemia, decreased PO intake and 

potential sepsis


-- Agree with gentle monitored hydration - monitor with saline @ 100 ml/hr


-- Document I/O


-- Obtain renal US following Alvarez placement


-- UA/microscopy pending


-- Hold ACEi


-- Check vanco level prior to next dose





ACS:


-- Cardiology consult pending





Sepsis with bursitis:


-- Antibiotics appropriate for renal function





Chronic kidney disease stage IV A2 in the setting of vascular disease, diabetes 

mellitus and hypertension. 


--Mr. Hummel has expressed multiple times in the past that he would not consider 

dialysis under any circumstances.  





Hypotension:


-- Home antihypertensives held


-- IV NS





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  


-- Stable





BPH with chronic lower urinary tract symptoms


-- Alvarez placement

## 2017-01-20 NOTE — DIAGNOSTIC IMAGING REPORT
LEFT KNEE 1 OR 2 VIEWS ROUTINE



CLINICAL HISTORY: Left knee pain and swelling.    



COMPARISON: None



FINDINGS:  Extensive vascular calcification is noted. No acute fracture is

present. No joint effusion is identified. There is marked prepatellar soft

tissue swelling. Soft tissue calcifications are noted. There is an indeterminate

4 mm radiodensity anterior to the medial tibial plateau.



IMPRESSION: 



1. No acute fracture.



2. Marked prepatellar and infrapatellar soft tissue swelling. This is

nonspecific but could reflect bursitis or contusion.







Electronically signed by:  Leoncio Briggs M.D.

1/20/2017 3:11 PM



Dictated Date/Time:  1/20/2017 3:10 PM

## 2017-01-20 NOTE — DIAGNOSTIC IMAGING REPORT
SINGLE VIEW CHEST



CLINICAL HISTORY:  Cough. Hypotension.



FINDINGS: An AP, portable, upright chest radiograph is compared to study dated

5/31/2016 and correlated with chest CT dated 4/28/2014. The examination is

degraded by portable technique and patient rotation.  The patient is status post

midline sternotomy. The heart is mildly enlarged and there is atherosclerotic

calcification of the thoracic aorta. The pulmonary vasculature is noncongested.

Chronic elevation of the right hemidiaphragm with right basilar atelectasis is

similar to previous. No airspace consolidation is seen typical for pneumonia and

there is no large pleural effusion. Emphysema is suspected. No pneumothorax is

seen. The skeletal structures are osteopenic. The bony thorax is grossly intact.



IMPRESSION: Cardiomegaly with no acute cardiopulmonary abnormality. Chronic

changes are detailed above.







Electronically signed by:  Juan Pablo Ellis M.D.

1/20/2017 1:34 PM



Dictated Date/Time:  1/20/2017 1:32 PM

## 2017-01-20 NOTE — HISTORY AND PHYSICAL
History & Physical


Date & Time of Service:


Jan 20, 2017 at 19:07


Chief Complaint:


Nstemi,Initial Episode Of Care


Primary Care Physician:


Isai Lucas M.D.


History of Present Illness


Source:  patient, family, spouse


The patient is an 81-year-old male who presents to the emergency department 

with a complaint of worsening generalized weakness, dizziness, shortness of 

breath, cough, decreased appetite over the past several days, and over the past 

24 hours has developed some loose stools.  He did have a left knee aspiration 

done on January 9 by his PCP of his left knee was look to be primarily bloody 

aspirate, and since that time, after he fell on the knee again, the swelling 

returned, along with significant tenderness.  He's currently on an unknown 

antibiotic.  He reports he took his Lantus dosing this morning, but his wife 

says he is noncompliant and doubts he took it this morning.





Past Medical/Surgical History


Medical Problems:


(1) Anemia


Status: Chronic  





(2) End stage renal disease


Status: Chronic  





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


Status: Chronic  





(4) Thrombocytopenia


Status: Chronic  





Surgical Problems:


(1) H/O heart bypass surgery


Status: Chronic  











Family History





Heart disease





Social History


Smoking Status:  Never Smoker


Smokeless Tobacco Use:  No


Alcohol Use:  none


Drug Use:  none


Marital Status:  


Housing status:  lives with family


Occupational Status:  retired





Immunizations


History of Influenza Vaccine:  Yes


History of Tetanus Vaccine?:  Yes


History of Pneumococcal:  Yes


History of Hepatitis B Vaccine:  No





Multi-Drug Resistant Organisms


History of MDRO:  No





Allergies


Coded Allergies:  


     No Known Allergies (Verified , 1/20/17)





Home Medications


Scheduled


Amlodipine (Norvasc), 10 MG PO DAILY


Atorvastatin (Lipitor), 40 MG PO DAILY


Cholecalciferol (Vitamin D3), 1 TAB PO DAILY


Clopidogrel (Plavix), 75 MG PO DAILY


Cyanocobalamin (Vitamin B-12 1000 Mcg), 1,000 MCG PO DAILY


Doxycycline Hyclate (Vibramycin), 100 MG PO Q12H


Insulin Glargine (Lantus Solostar), 18-20 UNITS SQ QAM


Insulin Lispro (Human) (Humalog Kwikpen), 8 UNITS SC TIDM


Isosorbide Mononitrate Ext Rel (Imdur Ext Rel), 120 MG PO QAM


Meclizine HCl (Meclizine HCl), 25 MG PO Q6H


Pantoprazole (Protonix), 40 MG PO DAILY





Scheduled PRN


Furosemide (Furosemide), 20 MG PO DAILY PRN for Fluid Retention


Nitroglycerin (Nitrostat), 0.4 MG SL UD PRN for Chest Pain





Review of Systems


The patient denies  palpitations,  vision change, hearing change, sore throat, 

fevers, chills, sweats, weight change, nausea, vomiting, abdominal pain, pelvic 

pain, blood in urine or stool, dysuria, urinary frequency or urgency,  headache

, memory loss, abnormal bruising  imbalance, focal weakness, numbness or 

tingling in arms , arthralgias or myalgias, back or neck pain, night sweats, or 

allergy symptoms.


The review of systems is otherwise negative other than for that already noted 

above, and at least 10 systems have been reviewed.





Physical Exam


Vital Signs











  Date Time  Temp Pulse Resp B/P Pulse Ox O2 Delivery O2 Flow Rate FiO2


 


1/20/17 19:02  76 16  93 Room Air  


 


1/20/17 16:15 36.5 57 15 108/54 98 Room Air  


 


1/20/17 15:43  61 16 87/44 100 Room Air  


 


1/20/17 15:16  56 16 94/47 100 Room Air  


 


1/20/17 14:41  57 18 89/50 100 Room Air  


 


1/20/17 14:32  57 18 96/48 100 Room Air  


 


1/20/17 14:25  57      


 


1/20/17 14:21  62 18 96/45 100 Room Air  


 


1/20/17 13:57  55 16 103/48 100 Room Air  


 


1/20/17 13:42  58 18 100/48 100 Room Air  


 


1/20/17 13:20  61 18 95/50 100 Room Air  


 


1/20/17 13:10  65 18 82/45 100 Room Air  


 


1/20/17 12:55  66 18 96/47 94 Room Air  


 


1/20/17 12:44  68 18 82/46 100 Room Air  


 


1/20/17 12:44 36.8       


 


1/20/17 12:40  67      


 


1/20/17 12:35  68 18 99/52 100 Room Air  


 


1/20/17 12:24  77 18 55/39 94 Room Air  








The patient is awake, well-developed and adequately nourished, alert and 

oriented 3, normocephalic and atraumatic, lying in bed and in no acute 

distress.


HEENT--PERRL, EOMI, mucous membranes moist, and oropharynx normal.


Neck--supple, no JVD or bruits, thyroid normal, trachea midline, no adenopathy.


Heart--normal S1 and S2, no extra beats, no murmurs, rubs or gallops.


Lungs--wheezes left greater than right anterior and posterior, no respiratory 

distress, no accessory muscle use.


Abdomen--normal bowel sounds and soft, nontender and nondistended, no hernias 

or masses,  no organomegaly.


Extremities--no cyanosis, clubbing or edema. There are good distal pulses b/l.


Dermatologic--normal skin turgor, normal color, warm and dry, no abnormal lymph 

nodes.


Neurologic--cranial nerves II through XII grossly intact.


Rheumatologic--left knee with prepatellar bursitis, that is warm and red and 

swollen.


Psychiatric--normal affect.





Diagnostics


Laboratory Results





Results Past 24 Hours








Test


  1/20/17


12:37 1/20/17


12:40 1/20/17


12:45 1/20/17


13:00 Range/Units


 


 


Bedside Glucose 427    70-99  mg/dl


 


White Blood Count  15.20   4.8-10.8  K/uL


 


Red Blood Count  3.97   4.7-6.1  M/uL


 


Hemoglobin  12.1   14.0-18.0  g/dL


 


Hematocrit  36.0   42-52  %


 


Mean Corpuscular Volume  90.7     fL


 


Mean Corpuscular Hemoglobin  30.5   25-34  pg


 


Mean Corpuscular Hemoglobin


Concent 


  33.6


  


  


  32-36  g/dl


 


 


Platelet Count  169   130-400  K/uL


 


Mean Platelet Volume  12.0   7.4-10.4  fL


 


RDW Standard Deviation  65.2   36.4-46.3  fL


 


RDW Coefficient of Variation  19.6   11.5-14.5  %


 


Neutrophils % (Manual)  48.2    %


 


Lymphocytes % (Manual)  7.1    %


 


Monocytes % (Manual)  42.0    %


 


Basophils % (Manual)  1.8   0-2  %


 


Myelocytes %  0.9    %


 


Neutrophils # (Manual)  7.33   1.4-6.5  K/uL


 


Total Absolute Neutrophils  7.33   1.4-6.5  K/uL


 


Lymphocytes # (Manual)  1.08   1.2-3.4  K/uL


 


Total Absolute Lymphocytes  1.08   1.2-3.4  K/uL


 


Monocytes # (Manual)  6.38   0.11-0.59  K/uL


 


Basophils # (Manual)  0.27   0-0.2  K/uL


 


Myelocytes #  0.14   0-0  K/uL


 


Giant Platelets  1+    


 


Spherocytes  1+    


 


Prothrombin Time


  


  12.3


  


  


  9.0-12.0


SECONDS


 


Prothromb Time International


Ratio 


  1.1


  


  


  0.9-1.1  


 


 


Activated Partial


Thromboplast Time 


  20.8


  


  


  21.0-31.0


SECONDS


 


Partial Thromboplastin Ratio  0.8    


 


Sodium Level  138   136-145  mmol/L


 


Potassium Level  4.3   3.5-5.1  mmol/L


 


Chloride Level  103     mmol/L


 


Carbon Dioxide Level  19   21-32  mmol/L


 


Anion Gap  16.0   3-11  mmol/L


 


Blood Urea Nitrogen  31   7-18  mg/dl


 


Creatinine


  


  2.50


  


  


  0.60-1.40


mg/dl


 


Estimated GFR (


American) 


  26.9


  


  


   


 


 


Estimated GFR (Non-


American 


  23.2


  


  


   


 


 


BUN/Creatinine Ratio  12.3   10-20  


 


Random Glucose  409   70-99  mg/dl


 


Calcium Level  9.1   8.5-10.1  mg/dl


 


Magnesium Level  1.7   1.8-2.4  mg/dl


 


Total Bilirubin  0.6   0.2-1  mg/dl


 


Direct Bilirubin  0.1   0-0.2  mg/dl


 


Aspartate Amino Transf


(AST/SGOT) 


  23


  


  


  15-37  U/L


 


 


Alanine Aminotransferase


(ALT/SGPT) 


  15


  


  


  12-78  U/L


 


 


Alkaline Phosphatase  97     U/L


 


Total Creatine Kinase  104     U/L


 


Creatine Kinase MB  5.5   0.5-3.6  ng/ml


 


Creatine Kinase MB Ratio  5.3   0-3.0  


 


Troponin I  1.070   0-0.045  ng/ml


 


Total Protein  7.8   6.4-8.2  gm/dl


 


Albumin  3.1   3.4-5.0  gm/dl


 


Beta-Hydroxybutyric Acid  1.97   0.2-2.81  mg/dL


 


Bedside Lactic Acid Venous


  


  


  4.88


  


  0.90-1.70


mmol/L


 


Influenza Type A (RT-PCR)    Neg for Influ A NEG  


 


Influenza Type B (RT-PCR)    Neg for Influ B NEG  














Test


  1/20/17


14:56 1/20/17


15:58 1/20/17


16:47 1/20/17


17:04 Range/Units


 


 


Bedside Glucose 359 304  257 70-99  mg/dl


 


Lactic Acid Level   3.1  0.4-2.0  mmol/L


 


Total Creatine Kinase   83    U/L


 


Creatine Kinase MB   4.0  0.5-3.6  ng/ml


 


Creatine Kinase MB Ratio   4.8  0-3.0  


 


Troponin I   0.996  0-0.045  ng/ml














Test


  1/20/17


18:06 


  


  


  Range/Units


 


 


Bedside Glucose 204    70-99  mg/dl








 Microbiology Results


1/20/17 Blood Culture, Received


          Pending


1/20/17 Blood Culture, Received


          Pending


1/20/17 MRSA DNA Surveillance Screen, Received


          Pending





Diagnostic Radiology


                                                                               

                                                                 


Patient Name: LYNDSAY PAYAN                               Unit Number:  

[id number]                  


 





 











Dictated: 01/20/17 1510


 


Transcribed: 01/20/17 1510


 


JA


 


Printed Date/Time: [~ rep prt dt]/[~ rep prt tm]








 [~ rep ct labl] - [~ rep ct ivnm]


 





   [facility]


 Radiology Department


 [place] [address] [phone]





 











Dictated: 01/20/17 1510


 


Transcribed: 01/20/17 1510


 


JA


 


Printed Date/Time: [~ rep prt dt]/[~ rep prt tm]








 [~ rep ct labl] - [~ rep ct ivnm]


 








LEFT KNEE 1 OR 2 VIEWS ROUTINE





CLINICAL HISTORY: Left knee pain and swelling.    





COMPARISON: None





FINDINGS:  Extensive vascular calcification is noted. No acute fracture is


present. No joint effusion is identified. There is marked prepatellar soft


tissue swelling. Soft tissue calcifications are noted. There is an indeterminate


4 mm radiodensity anterior to the medial tibial plateau.





IMPRESSION: 





1. No acute fracture.





2. Marked prepatellar and infrapatellar soft tissue swelling. This is


nonspecific but could reflect bursitis or contusion.











Electronically signed by:  Leoncio Briggs M.D.


1/20/2017 3:11 PM





Dictated Date/Time:  1/20/2017 3:10 PM





 The status of this report is Signed.   


 Draft = Not yet reviewed or approved by Radiologist.  


 Signed = Reviewed and approved by Radiologist.


<AttendingPhy></AttendingPhy> <FamilyPhy>Isai Lucas M.D.</FamilyPhy> <

PrimaryPhy>Isai Lucas M.D.</PrimaryPhy> <UnitNumber>[id number]</UnitNumber> 

<VisitNumber>[id number]</VisitNumber> <PatientName>LYNDSAY PAYAN</

PatientName> <DateOfBirth>1935</DateOfBirth> <Location>[facility]</Location> <

ServiceDate>01/20/17</ServiceDate> <MNE>ESINDI</MNE> <OrderingPhy>Ada Escoto M.D.</OrderingPhy> <OrderingPhyMNE>f rep ord dr solano</OrderingPhyMNE> <

DictatingPhyMNE>f rep dict dr solano</DictatingPhyMNE> <CCListMNE>f rep ct mne</

CCListMNE> <AdmittingPhyMNE>f pt admit dr solano</AdmittingPhyMNE> <AttendingPhyMNE

>f pt attend dr solano</AttendingPhyMNE>


<ConsultingPhyMNE>f pt consult dr solano</ConsultingPhyMNE> <FamilyPhyMNE>f pt fam 

dr solano</FamilyPhyMNE> <OtherPhyMNE>f pt other dr solano</OtherPhyMNE> <

PrimaryPhyMNE>f pt prim care dr solano</PrimaryPhyMNE> <ReferringPhyMNE>f pt 

referring dr solano</ReferringPhyMNE>


                                                                               

                                                                 


Patient Name: LYNDSAY PAYAN                               Unit Number:  

[id number]                  


 





 











Dictated: 01/20/17 1332


 


Transcribed: 01/20/17 1332


 


EV


 


Printed Date/Time: [~ rep prt dt]/[~ rep prt tm]








 [~ rep ct labl] - [~ rep ct ivnm]


 





   [facility]


 Radiology Department


 [place] [address] [phone]





 











Dictated: 01/20/17 1332


 


Transcribed: 01/20/17 1332


 


EV


 


Printed Date/Time: [~ rep prt dt]/[~ rep prt tm]








 [~ rep ct labl] - [~ rep ct ivnm]


 








SINGLE VIEW CHEST





CLINICAL HISTORY:  Cough. Hypotension.





FINDINGS: An AP, portable, upright chest radiograph is compared to study dated


5/31/2016 and correlated with chest CT dated 4/28/2014. The examination is


degraded by portable technique and patient rotation.  The patient is status post


midline sternotomy. The heart is mildly enlarged and there is atherosclerotic


calcification of the thoracic aorta. The pulmonary vasculature is noncongested.


Chronic elevation of the right hemidiaphragm with right basilar atelectasis is


similar to previous. No airspace consolidation is seen typical for pneumonia and


there is no large pleural effusion. Emphysema is suspected. No pneumothorax is


seen. The skeletal structures are osteopenic. The bony thorax is grossly intact.





IMPRESSION: Cardiomegaly with no acute cardiopulmonary abnormality. Chronic


changes are detailed above.











Electronically signed by:  Juan Pablo Ellis M.D.


1/20/2017 1:34 PM





Dictated Date/Time:  1/20/2017 1:32 PM





 The status of this report is Signed.   


 Draft = Not yet reviewed or approved by Radiologist.  


 Signed = Reviewed and approved by Radiologist.


<AttendingPhy></AttendingPhy> <FamilyPhy>Isai Lucas M.D.</FamilyPhy> <

PrimaryPhy>Isai Lucas M.D.</PrimaryPhy> <UnitNumber>[id number]</UnitNumber> 

<VisitNumber>[id number]</VisitNumber> <PatientName>LYNDSAY PAYAN</

PatientName> <DateOfBirth>1935</DateOfBirth> <Location>C.[facility]</Location> <

ServiceDate>01/20/17</ServiceDate> <MNE>ESINDI</MNE> <OrderingPhy>Ada Escoto M.D.</OrderingPhy> <OrderingPhyMNE>f rep ord dr solano</OrderingPhyMNE> <

DictatingPhyMNE>f rep dict dr solano</DictatingPhyMNE> <CCListMNE>f rep ct xiomara</

CCListMNE> <AdmittingPhyMNE>f pt admit dr solano</AdmittingPhyMNE> <AttendingPhyMNE

>f pt attend dr solano</AttendingPhyMNE>


<ConsultingPhyMNE>f pt consult dr solano</ConsultingPhyMNE> <FamilyPhyMNE>f pt fam 

dr solano</FamilyPhyMNE> <OtherPhyMNE>f pt other dr solano</OtherPhyMNE> <

PrimaryPhyMNE>f pt prim care dr solano</PrimaryPhyMNE> <ReferringPhyMNE>f pt 

referring dr solano</ReferringPhyMNE>





Impression


Assessment and Plan


SIRS - patient meets criteria for systemic inflammatory response syndrome, with 

possible sites including lungs and left knee.  He is admitted to the hospital 

and will be placed on vancomycin IV per pharmacy dosing, Zosyn 3.375 mg IV 

every 8 hours,  levofloxacin 500 mg IV every 24 hours, guaifenesin extended 

release 600 mg by mouth twice a day, and Xopenex with Atrovent nebulizers to 

use every 6 hours while awake and every 2 hours when necessary.  Continue 

Plavix 75 mg by mouth daily.  Lactic acid was elevated at 4.88, WBC is elevated 

at 15.20.





Elevated troponin/CAD/hypertension/angina--patient will be admitted to 

telemetry unit, for serial cardiac enzymes, cardiac rhythm monitoring, a 2-D 

echocardiogram with Dopplers, and consult his cardiologist Dr. Anderson.  His blood 

pressure was relatively low in the emergency department early on, and did 

respond to fluid resuscitation, but will hold his amlodipine 10 mg by mouth 

daily and isosorbide mononitrate extended release 120 mg by mouth every morning 

and his furosemide 20 mg daily when necessary.





Diabetes mellitus--continue Lantus insulin 20 units subcutaneous every morning, 

and place on Accu-Cheks before meals and at bedtime with NovoLog coverage.  

Will hold his standard 8 units subcutaneous 3 times a day with meals Humalog.  

Of note, he did not take his Lantus insulin the morning of admission.  And he 

was started on an insulin drip by the emergency department, but is not in 

either DKA or HHNK.  The insulin drip has been discontinued when his sugar 

dropped to 240, and he'll be placed on coverage for the rest of the evening, 

and coverage as noted above.





Left knee prepatellar bursitis--Will consult orthopedics.  He was noted to have 

a previous On January 9 which appeared primarily bloody.





Hypomagnesemia--magnesium upon entry labs is 1.7, will give 1 g of magnesium 

sulfate IV.





GERD--continue pantoprazole 40 mg by mouth daily.





Hypercholesterolemia--continue atorvastatin 40 mg by mouth daily.





Level of Care


Telemetry (6)





Advanced Directives


Existing Advance Directive:  Yes


Existing Living Will:  Yes


Existing Power of :  Yes





Resuscitation Status


FULL RESUSCITATION





VTE Prophylaxis


VTE Risk Assessment Done? Y/N:  Yes


Risk Level:  High


Given or contraindicated:  SCD's

## 2017-01-20 NOTE — PHARMACY PROGRESS NOTE
Pharmacy Antibiotic Consult


Date of Service:


Jan 20, 2017.


Pharmacy Dosing Scope


Pharmacy is consulted to initiate Vancomycin IV dosing therapy, order 

appropriate labs and adjust drug dose/frequency.





Subjective


The patient is a 81 year old male admitted on 1/21/17.





Objective


Height (Feet):  5


Height (Inches):  11.00


Weight (Kilograms):  87.100


Lab Results (24hrs):





Laboratory Tests








Test


  1/20/17


12:40


 


BUN/Creatinine Ratio 12.3 


 


Blood Urea Nitrogen 31 mg/dl 


 


Creatinine 2.50 mg/dl 


 


White Blood Count 15.20 K/uL 


 


Red Blood Count 3.97 M/uL 


 


Hemoglobin 12.1 g/dL 


 


Hematocrit 36.0 % 


 


Mean Corpuscular Volume 90.7 fL 


 


Mean Corpuscular Hemoglobin 30.5 pg 


 


Mean Corpuscular Hemoglobin


Concent 33.6 g/dl 


 


 


Platelet Count 169 K/uL 


 


Mean Platelet Volume 12.0 fL 








Micro Results:











Item Value  Date Time


 


Blood Culture Received 1/20/17 1250





Blood Pending 


 


Blood Culture Received 1/20/17 1240





Blood Pending 











Recent Pertinent Medications











Item Value  Date Time


 


Levofloxacin 750 150 ml @ 100 mls/hr 1/22/17 1400





mg/Prmx Q2D@1400/IV 


 


Piperacillin Sod/ 115 ml 1/20/17 2200





Tazobactam Sod Q8/IV 





3.375 gm/Dextrose  











Assessment & Plan


80 yo M admitted with weakness and shortness of breath initiated on broad 

spectrum IV antibiotics: Vancomycin, Levaquin, Zosyn IV. Of note, patient 

recently was on doxycycline PO- unsure of indication (knee or respiratory). 

Blood cultures pending. MRSA nasal swab ordered to aid in de-escalation of 

antibiotics given pulmonary source. 





Vancomycin


* Loading dose: 1750 mg IV X 1 dose (modified load due to CKD)


* Since patient's estimated half life is ~30 hours, I find it inappropriate to 

schedule ongoing dosing


* Goal trough level estimate:  between 15- 20 mcg/mL


* A random level has been ordered for:   1/ 21 / 17 with AM labs


* *If MRSA swab negative, recommend discontinuing Vancomycin





Zosyn


* Continue 3.375 g IV every 8 hours


* No renal adjustment unless Crcl <20 mL/min


* No adjustment necessary for critical illness or BMI





Levaquin


* 750 mg IV every 48 hours


* Adjustment for Crcl 20 to 49 mL/min





Pharmacy will continue to follow and will adjust dose/frequency as necessary.  

Thank you

## 2017-01-21 VITALS
SYSTOLIC BLOOD PRESSURE: 115 MMHG | OXYGEN SATURATION: 96 % | TEMPERATURE: 97.88 F | DIASTOLIC BLOOD PRESSURE: 55 MMHG | HEART RATE: 71 BPM

## 2017-01-21 VITALS
HEART RATE: 70 BPM | OXYGEN SATURATION: 95 % | DIASTOLIC BLOOD PRESSURE: 70 MMHG | TEMPERATURE: 97.88 F | SYSTOLIC BLOOD PRESSURE: 136 MMHG

## 2017-01-21 VITALS
DIASTOLIC BLOOD PRESSURE: 69 MMHG | OXYGEN SATURATION: 92 % | HEART RATE: 73 BPM | TEMPERATURE: 98.06 F | SYSTOLIC BLOOD PRESSURE: 119 MMHG

## 2017-01-21 VITALS
OXYGEN SATURATION: 93 % | TEMPERATURE: 98.42 F | DIASTOLIC BLOOD PRESSURE: 68 MMHG | HEART RATE: 55 BPM | SYSTOLIC BLOOD PRESSURE: 142 MMHG

## 2017-01-21 VITALS
SYSTOLIC BLOOD PRESSURE: 159 MMHG | TEMPERATURE: 98.42 F | DIASTOLIC BLOOD PRESSURE: 70 MMHG | OXYGEN SATURATION: 97 % | HEART RATE: 64 BPM

## 2017-01-21 VITALS — HEART RATE: 73 BPM | OXYGEN SATURATION: 94 %

## 2017-01-21 VITALS
OXYGEN SATURATION: 97 % | SYSTOLIC BLOOD PRESSURE: 176 MMHG | DIASTOLIC BLOOD PRESSURE: 68 MMHG | TEMPERATURE: 97.88 F | HEART RATE: 75 BPM

## 2017-01-21 VITALS — HEART RATE: 75 BPM | OXYGEN SATURATION: 95 %

## 2017-01-21 VITALS — OXYGEN SATURATION: 90 % | HEART RATE: 58 BPM

## 2017-01-21 VITALS
OXYGEN SATURATION: 98 % | SYSTOLIC BLOOD PRESSURE: 132 MMHG | HEART RATE: 56 BPM | DIASTOLIC BLOOD PRESSURE: 68 MMHG | TEMPERATURE: 97.52 F

## 2017-01-21 VITALS — OXYGEN SATURATION: 93 %

## 2017-01-21 VITALS — HEART RATE: 67 BPM | OXYGEN SATURATION: 93 %

## 2017-01-21 VITALS — OXYGEN SATURATION: 96 %

## 2017-01-21 LAB
ANION GAP SERPL CALC-SCNC: 12 MMOL/L (ref 3–11)
BASOPHILS # BLD: 0.1 K/UL (ref 0–0.2)
BASOPHILS NFR BLD: 0.9 % (ref 0–2)
BUN SERPL-MCNC: 36 MG/DL (ref 7–18)
BUN/CREAT SERPL: 14.5 (ref 10–20)
CALCIUM SERPL-MCNC: 7.7 MG/DL (ref 8.5–10.1)
CHLORIDE SERPL-SCNC: 107 MMOL/L (ref 98–107)
CKMB/CK RATIO: 3.9 (ref 0–3)
CKMB/CK RATIO: 4.7 (ref 0–3)
CO2 SERPL-SCNC: 23 MMOL/L (ref 21–32)
COMPLETE: YES
CREAT CL PREDICTED SERPL C-G-VRATE: 23.9 ML/MIN
CREAT SERPL-MCNC: 2.5 MG/DL (ref 0.6–1.4)
EOSINOPHIL NFR BLD AUTO: 101 K/UL (ref 130–400)
GLUCOSE SERPL-MCNC: 205 MG/DL (ref 70–99)
HCT VFR BLD CALC: 27.2 % (ref 42–52)
INR PPP: 1.3 (ref 0.9–1.1)
LYMPHOCYTES # BLD: 0.78 K/UL (ref 1.2–3.4)
LYMPHOCYTES NFR BLD: 7 %
MAGNESIUM SERPL-MCNC: 1.8 MG/DL (ref 1.8–2.4)
MCH RBC QN AUTO: 29.9 PG (ref 25–34)
MCHC RBC AUTO-ENTMCNC: 33.8 G/DL (ref 32–36)
MCV RBC AUTO: 88.3 FL (ref 80–100)
META ABS #: 0.19 K/UL (ref 0–0)
METAMYELOCYTES NFR BLD: 1.7 %
MYELOCYTES NFR BLD: 0.9 %
NEUTROPHILS NFR BLD AUTO: 48.6 %
PARTIAL THROMBOPLASTIN RATIO: 2.7
PARTIAL THROMBOPLASTIN RATIO: 3.1
PHOSPHATE SERPL-MCNC: 4 MG/DL (ref 2.5–4.9)
PMV BLD AUTO: 12.3 FL (ref 7.4–10.4)
POTASSIUM SERPL-SCNC: 3.7 MMOL/L (ref 3.5–5.1)
PROTHROMBIN TIME: 13.6 SECONDS (ref 9–12)
RBC # BLD AUTO: 3.08 M/UL (ref 4.7–6.1)
SODIUM SERPL-SCNC: 142 MMOL/L (ref 136–145)
WBC # BLD AUTO: 11.13 K/UL (ref 4.8–10.8)

## 2017-01-21 PROCEDURE — 0VNTXZZ RELEASE PREPUCE, EXTERNAL APPROACH: ICD-10-PCS | Performed by: UROLOGY

## 2017-01-21 RX ADMIN — CLOPIDOGREL BISULFATE SCH MG: 75 TABLET, FILM COATED ORAL at 08:04

## 2017-01-21 RX ADMIN — INSULIN ASPART SCH UNITS: 100 INJECTION, SOLUTION INTRAVENOUS; SUBCUTANEOUS at 16:15

## 2017-01-21 RX ADMIN — IPRATROPIUM BROMIDE SCH MG: 0.5 SOLUTION RESPIRATORY (INHALATION) at 23:40

## 2017-01-21 RX ADMIN — BUDESONIDE SCH MG: 0.5 SUSPENSION RESPIRATORY (INHALATION) at 07:30

## 2017-01-21 RX ADMIN — ACETAMINOPHEN PRN MG: 325 TABLET ORAL at 04:14

## 2017-01-21 RX ADMIN — PANTOPRAZOLE SODIUM SCH MLS/MIN: 40 INJECTION, POWDER, FOR SOLUTION INTRAVENOUS at 11:00

## 2017-01-21 RX ADMIN — BUDESONIDE SCH MG: 0.5 SUSPENSION RESPIRATORY (INHALATION) at 19:35

## 2017-01-21 RX ADMIN — LEVALBUTEROL SCH MG: 1.25 SOLUTION, CONCENTRATE RESPIRATORY (INHALATION) at 19:35

## 2017-01-21 RX ADMIN — SODIUM CHLORIDE SCH MLS/HR: 900 INJECTION, SOLUTION INTRAVENOUS at 01:24

## 2017-01-21 RX ADMIN — LEVALBUTEROL SCH MG: 1.25 SOLUTION, CONCENTRATE RESPIRATORY (INHALATION) at 07:30

## 2017-01-21 RX ADMIN — BACITRACIN SCH APPLN: 500 OINTMENT TOPICAL at 15:19

## 2017-01-21 RX ADMIN — GUAIFENESIN SCH MG: 200 TABLET ORAL at 21:45

## 2017-01-21 RX ADMIN — IPRATROPIUM BROMIDE SCH MG: 0.5 SOLUTION RESPIRATORY (INHALATION) at 03:00

## 2017-01-21 RX ADMIN — HEPARIN SODIUM PRN MLS/HR: 5000 INJECTION, SOLUTION INTRAVENOUS at 06:25

## 2017-01-21 RX ADMIN — PIPERACILLIN AND TAZOBACTAM SCH MLS/HR: 3; .375 INJECTION, POWDER, LYOPHILIZED, FOR SOLUTION INTRAVENOUS; PARENTERAL at 21:44

## 2017-01-21 RX ADMIN — INSULIN ASPART SCH UNITS: 100 INJECTION, SOLUTION INTRAVENOUS; SUBCUTANEOUS at 21:47

## 2017-01-21 RX ADMIN — DOCUSATE SODIUM SCH MG: 100 CAPSULE, LIQUID FILLED ORAL at 08:05

## 2017-01-21 RX ADMIN — IPRATROPIUM BROMIDE SCH MG: 0.5 SOLUTION RESPIRATORY (INHALATION) at 19:35

## 2017-01-21 RX ADMIN — ATORVASTATIN CALCIUM SCH MG: 40 TABLET, FILM COATED ORAL at 08:04

## 2017-01-21 RX ADMIN — DOCUSATE SODIUM SCH MG: 100 CAPSULE, LIQUID FILLED ORAL at 21:45

## 2017-01-21 RX ADMIN — INSULIN GLARGINE SCH UNIT: 100 INJECTION, SOLUTION SUBCUTANEOUS at 08:11

## 2017-01-21 RX ADMIN — LEVALBUTEROL SCH MG: 1.25 SOLUTION, CONCENTRATE RESPIRATORY (INHALATION) at 03:00

## 2017-01-21 RX ADMIN — MECLIZINE HYDROCHLORIDE SCH MG: 25 TABLET ORAL at 18:13

## 2017-01-21 RX ADMIN — GUAIFENESIN SCH MG: 200 TABLET ORAL at 08:04

## 2017-01-21 RX ADMIN — MECLIZINE HYDROCHLORIDE SCH MG: 25 TABLET ORAL at 06:07

## 2017-01-21 RX ADMIN — LEVALBUTEROL SCH MG: 1.25 SOLUTION, CONCENTRATE RESPIRATORY (INHALATION) at 14:20

## 2017-01-21 RX ADMIN — PIPERACILLIN AND TAZOBACTAM SCH MLS/HR: 3; .375 INJECTION, POWDER, LYOPHILIZED, FOR SOLUTION INTRAVENOUS; PARENTERAL at 04:12

## 2017-01-21 RX ADMIN — MECLIZINE HYDROCHLORIDE SCH MG: 25 TABLET ORAL at 00:00

## 2017-01-21 RX ADMIN — Medication SCH INTER.UNIT: at 08:04

## 2017-01-21 RX ADMIN — PIPERACILLIN AND TAZOBACTAM SCH MLS/HR: 3; .375 INJECTION, POWDER, LYOPHILIZED, FOR SOLUTION INTRAVENOUS; PARENTERAL at 12:00

## 2017-01-21 RX ADMIN — MORPHINE SULFATE PRN MG: 2 INJECTION, SOLUTION INTRAMUSCULAR; INTRAVENOUS at 12:11

## 2017-01-21 RX ADMIN — HEPARIN SODIUM SCH UNIT: 10000 INJECTION, SOLUTION INTRAVENOUS; SUBCUTANEOUS at 21:48

## 2017-01-21 RX ADMIN — IPRATROPIUM BROMIDE SCH MG: 0.5 SOLUTION RESPIRATORY (INHALATION) at 14:20

## 2017-01-21 RX ADMIN — IPRATROPIUM BROMIDE SCH MG: 0.5 SOLUTION RESPIRATORY (INHALATION) at 07:30

## 2017-01-21 RX ADMIN — LEVALBUTEROL SCH MG: 1.25 SOLUTION, CONCENTRATE RESPIRATORY (INHALATION) at 23:40

## 2017-01-21 RX ADMIN — INSULIN ASPART SCH UNITS: 100 INJECTION, SOLUTION INTRAVENOUS; SUBCUTANEOUS at 11:30

## 2017-01-21 RX ADMIN — INSULIN ASPART SCH UNITS: 100 INJECTION, SOLUTION INTRAVENOUS; SUBCUTANEOUS at 07:00

## 2017-01-21 RX ADMIN — MECLIZINE HYDROCHLORIDE SCH MG: 25 TABLET ORAL at 12:00

## 2017-01-21 RX ADMIN — Medication SCH MCG: at 08:05

## 2017-01-21 NOTE — DIAGNOSTIC IMAGING REPORT
RENAL ULTRASOUND



CLINICAL HISTORY: Acute kidney injury. Chronic kidney disease.    



COMPARISON STUDY:  CT of the abdomen and pelvis September 24, 2007.



TECHNIQUE:  Sonography of the kidneys and the urinary bladder was performed.



FINDINGS: This exam is compromised by suboptimal penetration. However, there is

no hydronephrosis. The right kidney measures 10.6 x 6.4 x 6.3 cm and the left

measures 11.2 6.2 x 6.8 cm. There is moderate renal cortical thinning. Increased

renal echogenicity is noted. The left ureteral jet was not visualized.



IMPRESSION: 



1. No hydronephrosis.



2. Moderate renal cortical thinning.



3. Study compromised by suboptimal penetration.







Electronically signed by:  Leoncio Briggs M.D.

1/21/2017 9:24 PM



Dictated Date/Time:  1/21/2017 9:22 PM

## 2017-01-21 NOTE — ECHOCARDIOGRAM REPORT
*NOTICE TO RECEIVING PARTY AGENCY**  This information is strictly Confidential and protected under 
[place] law.  [place] law prohibits you from making any further disclosure of this 
information unless further disclosure is expressly permitted by the written consent of the person to 
whom it pertains or is authorized by law.  A general authorization for the release of medical or 
other information is not sufficient for this purpose.  Hospital accepts no responsibility if the 
information is made available to any other person, INCLUDING THE PATIENT.



Interpretation Summary

  *  Name: LYNDSAY PAYAN SR  Study Date: 2017 10:22 AM  BP: 115/55 mmHg

  *  MRN: [id number]  Patient Location: C.2T\S\S239\S\2  HR: 71

  *  : 1935 ([birth year])  Gender: Male  Height: 71 in

  *  Age: 81 yrs  Ethnicity: [place]  Weight: 192 lb

  *  Ordering Physician: Phil Singletary

  *  Referring Physician: Isai Lucas

  *  Performed By: Ewelina Gray

  *  Accession# [id number]  Account# [id number]

  *  Reason For Study: NSTEMI

  *  BSA: 2.1 m2

  *  -- Conclusions --

  *  1. Normal LV size.  Moderate concentric LVH with asymmetric basal septal hypertrophy.

  *  2. Normal LV systolic function.  LVEF 65-70%.  No regional wall motion abnormalities.

  *  3. Mildly dilated RV with borderline reduced function.

  *  4. Mild aortic insufficiency

  *  5. Questionable MV ARIA.  Trace MR.

  *  6. Borderline dilated aortic root, ascending aorta.

  *  7. Compared with prior study on 2016:  No significant change.

Procedure Details

  *  A complete two-dimensional transthoracic echocardiogram was performed (2D, M-mode, Doppler and 
color flow Doppler).

Left Ventricle

  *  The left ventricle is grossly normal size.

  *  There is mild concentric left ventricular hypertrophy.

  *  Asymmetric basal septal hypertrophy (1.8 cm)

  *  Ejection Fraction = 65-70%.

  *  No regional wall motion abnormalities noted.

Right Ventricle

  *  The right ventricle is not well visualized.

  *  The right ventricle is mildly dilated.

  *  The right ventricular systolic function is borderline reduced.

Atria

  *  The left atrial size is normal.

  *  Borderline right atrial enlargement.

  *  Right atrium not well visualized.

  *  No ASD detected; PFO is not assessed.

Mitral Valve

  *  The mitral valve is grossly normal.

  *  Questionable mitral valve ARIA

  *  There is no mitral valve stenosis.

  *  There is trace mitral regurgitation.

Tricuspid Valve

  *  The tricuspid valve is not well visualized, but is grossly normal.

  *  There is no tricuspid stenosis.

  *  There is trace tricuspid regurgitation.

Aortic Valve

  *  The aortic valve opens well.

  *  The aortic valve is trileaflet.

  *  No hemodynamically significant valvular aortic stenosis.

  *  Mild aortic regurgitation.

Pulmonic Valve

  *  The pulmonic valve is not well seen, but is grossly normal.

  *  There is no pulmonic valvular stenosis.

  *  Mild pulmonic valvular regurgitation.

Great Vessels

  *  Borderline aortic root dilatation.

  *  Borderline dilated ascending aorta.

  *  No Doppler or imaging evidence of an aortic coarctation.

  *  Normal inferior vena cava diameter and respiratory variation suggests normal central venous 
pressure.



MMode 2D Measurements and Calculations

IVSd 1.4 cm

IVSs 1.7 cm



LVIDd 4.5 cm

LVIDs 2.4 cm

LVPWd 1.2 cm

LVPWs 2.0 cm



IVS/LVPW 1.2 

FS 46.7 %

EDV(Teich) 90.8 ml

ESV(Teich) 19.8 ml

EF(Teich) 78.2 %



EDV(cubed) 89.1 ml

ESV(cubed) 13.5 ml

EF(cubed) 84.8 %

% IVS thick 21.8 %

% LVPW thick 69.6 %





LV mass(C)d 222.9 grams

LV mass(C)dI 107.6 grams/m\S\2

LV mass(C)s 182.4 grams

LV mass(C)sI 88.0 grams/m\S\2



SV(Teich) 71.1 ml

SI(Teich) 34.3 ml/m\S\2

SV(cubed) 75.6 ml

SI(cubed) 36.5 ml/m\S\2



Ao root diam 4.1 cm

Ao root area 12.9 cm\S\2

ACS 1.5 cm

LA dimension 5.1 cm



asc Aorta Diam 3.8 cm





LA/Ao 1.2 

LVOT diam 1.8 cm

LVOT area 2.6 cm\S\2



LVAd ap4 37.9 cm\S\2

LVLd ap4 8.7 cm

EDV(MOD-sp4) 137.3 ml

EDV(sp4-el) 141.0 ml

LVAs ap4 17.8 cm\S\2

LVLs ap4 6.3 cm

ESV(MOD-sp4) 41.8 ml

ESV(sp4-el) 42.9 ml

EF(MOD-sp4) 69.6 %

EF(sp4-el) 69.6 %



LVAd ap2 29.4 cm\S\2

LVLd ap2 8.7 cm

EDV(MOD-sp2) 82.7 ml

EDV(sp2-el) 84.3 ml

LVAs ap2 13.5 cm\S\2

LVLs ap2 6.6 cm

ESV(MOD-sp2) 24.8 ml

ESV(sp2-el) 23.4 ml

EF(MOD-sp2) 70.0 %

EF(sp2-el) 72.2 %



LVLd %diff 0.08 %

EDV(MOD-bp) 107.4 ml

LVLs %diff 4.2 %

ESV(MOD-bp) 32.2 ml

EF(MOD-bp) 70.0 %





SV(MOD-sp4) 95.5 ml

SI(MOD-sp4) 46.1 ml/m\S\2



SV(MOD-sp2) 57.9 ml

SI(MOD-sp2) 27.9 ml/m\S\2



SV(MOD-bp) 75.1 ml

SI(MOD-bp) 36.3 ml/m\S\2



SV(sp4-el) 98.1 ml

SI(sp4-el) 47.3 ml/m\S\2





SV(sp2-el) 60.9 ml

SI(sp2-el) 29.4 ml/m\S\2













Doppler Measurements and Calculations

MV E max loree 87.4 cm/sec

MV A max loree 102.5 cm/sec



MV E/A 0.85 



MV dec time 0.21 sec



Ao V2 max 159.6 cm/sec

Ao max PG 10.2 mmHg

Ao max PG (full) 2.4 mmHg

ERIBERTO(V,A) 2.3 cm\S\2

ERIBERTO(V,D) 2.3 cm\S\2





AI max loree 415.4 cm/sec

AI max PG 69.0 mmHg

AI dec slope 181.5 cm/sec\S\2

AI P1/2t 670.4 msec



LV V1 max PG 7.8 mmHg

LV V1 mean PG 3.6 mmHg



LV V1 max 139.8 cm/sec

LV V1 mean 87.1 cm/sec

LV V1 VTI 31.1 cm



SV(LVOT) 80.7 ml

SI(LVOT) 39.0 ml/m\S\2





PA V2 max 81.4 cm/sec

PA max PG 2.7 mmHg



PI end-d loree 98.9 cm/sec



TR max loree 240.1 cm/sec

## 2017-01-21 NOTE — HOSPITALIST PROGRESS NOTE
Hospitalist Progress Note


Date of Service


Jan 21, 2017.





Subjective


Pt evaluation today including:  conversation w/ patient, physical exam, chart 

review, lab review, review of studies, review of inpatient medication list


overnight doing ok, no acute event





Objective


Vital Signs











  Date Time  Temp Pulse Resp B/P Pulse Ox O2 Delivery O2 Flow Rate FiO2


 


1/21/17 12:03      Nasal Cannula 2.0 


 


1/21/17 11:52 36.4 56 16 132/68 98  2.0 


 


1/21/17 08:04      Nasal Cannula 2.0 


 


1/21/17 07:30  55 16  93 Room Air  


 


1/21/17 07:30 36.9 56 20 142/68 94   


 


1/21/17 04:12 36.6 71 18 115/55 96   


 


1/21/17 04:00     96 Room Air  


 


1/21/17 03:00  58 16  90 Room Air  


 


1/21/17 00:46 36.6 70 16 136/70 95 Room Air  


 


1/21/17 00:01     93 Room Air  


 


1/20/17 20:00     93 Room Air  


 


1/20/17 19:34 36.4 62 16 119/62 93 Room Air  


 


1/20/17 19:02  76 16  93 Room Air  


 


1/20/17 16:15 36.5 57 15 108/54 98 Room Air  


 


1/20/17 15:43  61 16 87/44 100 Room Air  


 


1/20/17 15:16  56 16 94/47 100 Room Air  


 


1/20/17 14:41  57 18 89/50 100 Room Air  











Physical Exam


General Appearance:  no apparent distress


Eyes:  normal inspection


ENT:  hearing grossly normal


Neck:  supple


Respiratory/Chest:  chest non-tender, + decreased breath sounds, + pertinent 

finding (right side lung sound is more congested comparing to left side )


Cardiovascular:  regular rate, rhythm, no edema


Abdomen:  normal bowel sounds, non tender, soft


Extremities:  + swelling, + pertinent finding (left knee swelling, erythma )


Neurologic/Psychiatric:  no motor/sensory deficits


Skin:  + pertinent finding (left knee erythma)





Laboratory Results





Last 24 Hours








Test


  1/20/17


14:56 1/20/17


15:58 1/20/17


16:47 1/20/17


17:04


 


Bedside Glucose 359 mg/dl  304 mg/dl   257 mg/dl 


 


Lactic Acid Level   3.1 mmol/L  


 


Total Creatine Kinase   83 U/L  


 


Creatine Kinase MB   4.0 ng/ml  


 


Creatine Kinase MB Ratio   4.8  


 


Troponin I   0.996 ng/ml  














Test


  1/20/17


18:06 1/20/17


20:38 1/20/17


21:55 1/20/17


23:46


 


Bedside Glucose 204 mg/dl  219 mg/dl   


 


Urine Color   YELLOW  


 


Urine Appearance   CLOUDY  


 


Urine pH   5.0  


 


Urine Specific Gravity   1.014  


 


Urine Protein   TRACE  


 


Urine Glucose (UA)   2+  


 


Urine Ketones   NEG  


 


Urine Occult Blood   3+  


 


Urine Nitrite   NEG  


 


Urine Bilirubin   NEG  


 


Urine Urobilinogen   NEG  


 


Urine Leukocyte Esterase   NEG  


 


Urine WBC (Auto)   1-5 /hpf  


 


Urine RBC (Auto)   0-4 /hpf  


 


Urine Hyaline Casts (Auto)   10-30 /lpf  


 


Urine Epithelial Cells (Auto)   >30 /lpf  


 


Urine Bacteria (Auto)   NEG  


 


Urine Renal Epithelial Cells   0-5 /lpf  


 


Urine Pathogenic Casts


  


  


  1-5 GRANULAR


CASTS /lpf 


 


 


Urine Yeast (Auto)     


 


Total Creatine Kinase    79 U/L 


 


Creatine Kinase MB    3.7 ng/ml 


 


Creatine Kinase MB Ratio    4.7 


 


Troponin I    0.943 ng/ml 














Test


  1/21/17


02:10 1/21/17


04:51 1/21/17


04:57 1/21/17


06:59


 


Activated Partial


Thromboplast Time 69.9 SECONDS 


  


  80.7 SECONDS 


  


 


 


Partial Thromboplastin Ratio 2.7   3.1  


 


White Blood Count  11.13 K/uL   


 


Red Blood Count  3.08 M/uL   


 


Hemoglobin  9.2 g/dL   


 


Hematocrit  27.2 %   


 


Mean Corpuscular Volume  88.3 fL   


 


Mean Corpuscular Hemoglobin  29.9 pg   


 


Mean Corpuscular Hemoglobin


Concent 


  33.8 g/dl 


  


  


 


 


Platelet Count  101 K/uL   


 


Mean Platelet Volume  12.3 fL   


 


RDW Standard Deviation  63.7 fL   


 


RDW Coefficient of Variation  19.8 %   


 


Neutrophils % (Manual)  48.6 %   


 


Lymphocytes % (Manual)  7.0 %   


 


Monocytes % (Manual)  40.9 %   


 


Basophils % (Manual)  0.9 %   


 


Metamyelocytes %  1.7 %   


 


Myelocytes %  0.9 %   


 


Neutrophils # (Manual)  5.41 K/uL   


 


Total Absolute Neutrophils  5.41 K/uL   


 


Lymphocytes # (Manual)  0.78 K/uL   


 


Total Absolute Lymphocytes  0.78 K/uL   


 


Monocytes # (Manual)  4.55 K/uL   


 


Basophils # (Manual)  0.10 K/uL   


 


Metamyelocytes #  0.19 K/uL   


 


Myelocytes #  0.10 K/uL   


 


Prothrombin Time   13.6 SECONDS  


 


Prothromb Time International


Ratio 


  


  1.3 


  


 


 


Sodium Level   142 mmol/L  


 


Potassium Level   3.7 mmol/L  


 


Chloride Level   107 mmol/L  


 


Carbon Dioxide Level   23 mmol/L  


 


Anion Gap   12.0 mmol/L  


 


Blood Urea Nitrogen   36 mg/dl  


 


Creatinine   2.50 mg/dl  


 


Est Creatinine Clear Calc


Drug Dose 


  


  23.9 ml/min 


  


 


 


Estimated GFR (


American) 


  


  26.9 


  


 


 


Estimated GFR (Non-


American 


  


  23.2 


  


 


 


BUN/Creatinine Ratio   14.5  


 


Random Glucose   205 mg/dl  


 


Calcium Level   7.7 mg/dl  


 


Phosphorus Level   4.0 mg/dl  


 


Magnesium Level   1.8 mg/dl  


 


Random Vancomycin Level   17.6 mcg/ml  


 


Bedside Glucose    208 mg/dl 














Test


  1/21/17


07:25 1/21/17


11:09 


  


 


 


Total Creatine Kinase 83 U/L    


 


Creatine Kinase MB 3.2 ng/ml    


 


Creatine Kinase MB Ratio 3.9    


 


Troponin I 0.764 ng/ml    


 


Bedside Glucose  266 mg/dl   











Assessment and Plan


Patient is an 80-year-old male with PMH of atherosclerotic coronary artery 

disease, diabetes mellitus, CKD stage IV, HTN, dyslipidemia, monoclonal 

gammopathy with chronic anemia and thrombocytopenia presented to ER due to 

weakness, SOB and cough 





1 SIRS - possible etiologies including lung and left knee. will continue IV 

zosyn and vancomycin. f/u lactic acid. continue IV fluid 100/hour/ f/u blood 

culture, sputum culture. 





2 Elevated troponin- troponin was trending down. ECHO showed EF 65-70%, 

cardiologist is on board, doesn't consider to do stress test now. 





3 Diabetes mellitus--continue Lantus insulin 20 units subcutaneous every morning

, and place on Accu-Cheks before meals and at bedtime with NovoLog coverage.  





4 Left knee prepatellar bursitis- f/u orthopedics, consider aspiration 





5 Acute renal insufficiency: renal is on board, suspect prerenal in the setting 

of hyperglycemia and decreased PO intake, continue IV fluid 100cc/hour. f/u 

renal US. 





DVT prophylaxis: heparin 


Code: full

## 2017-01-21 NOTE — CARDIOLOGY FOLLOW-UP
Subjective


Date of Service:


Jan 21, 2017.


Pt evaluation today including:  conversation w/ patient, conversation w/ family

, physical exam, lab review, review of studies, review of inpatient medication 

list


History of Present Illness


This is an 81-year-old gentleman with a history of coronary artery disease 

including myocardial infarction in 1997 bypass surgery in 2008, stage IV kidney 

disease, diabetes, hyperlipidemia, sick sinus syndrome and a history of chest 

discomfort. His chest discomfort has been evaluated in the past and he has had 

elevated troponins in the past however with his kidney disease he has declined 

invasive evaluation although it has been offered. He has also had a GI bleed in 

May 2016.





He was hospitalized in September 2015 and at that time his troponin increased 

to a maximum of 2.4, in May 2016 his troponin was 0.068. He had an 

echocardiogram done 05/29/2016 which showed normal left ventricular size and 

function with concentric left ventricular hypertrophy. He presented now with 

symptoms of fatigue and was noted to have an elevated troponin as well as lung 

disease. He was not having chest discomfort, he does have chronic difficulty 

with exertion which is becoming very short of breath with climbing steps or 

even minimal exertion. He does not think that is been changing recently but has 

changed over the last several years. He did use a nitroglycerin tablet for 

chest discomfort on the evening of 01/19/2017, he evidently hasn't use one for 

some time before that. That did relieve his chest discomfort. He feels that he 

is suffering from a "cold" and is having little more difficulty with his 

breathing than usual.





Since admission he has had no chest pain. He is only complaining of knee pain 

today.





Social History


Smoking Status:  Never Smoker


History of Alcohol Use:  No





Review of Systems


Respiratory:  + dyspnea on exertion, + see HPI, + wheezing, No cough, No 

shortness of breath


Cardiac:  + see HPI, No chest pain





Medications


Cardiovascular:











Item Value  Date Time


 


Atorvastatin 40 mg 1/21/17 0900





Calcium DAILY/PO 1/21/17 0804





 (Lipitor Tab)  


 


Clopidogrel 75 mg 1/21/17 0900





Bisulfate DAILY/PO 1/21/17 0804





 (plAVix TAB)  


 


Nitroglycerin 0.4 mg 1/20/17 1515





 (Nitrostat Tab) UD  PRN/SL 











Objective





 Vital Signs Past 12 Hours








  Date Time  Temp Pulse Resp B/P Pulse Ox O2 Delivery O2 Flow Rate FiO2


 


1/21/17 07:30  55 16  93 Room Air  


 


1/21/17 07:30 36.9 56 20 142/68 94   


 


1/21/17 04:12 36.6 71 18 115/55 96   


 


1/21/17 04:00     96 Room Air  


 


1/21/17 03:00  58 16  90 Room Air  


 


1/21/17 00:46 36.6 70 16 136/70 95 Room Air  


 


1/21/17 00:01     93 Room Air  








Last Recorded Weight-Kilograms:  88.200


Intake & Output











 8-Hour Column   


 


 1/20/17 1/21/17 1/21/17





 16:00 00:00 08:00


 


Intake Total  1554 ml 1261 ml


 


Output Total  250 ml 690 ml


 


Balance  1304 ml 571 ml














 24-Hour Column 


 


 1/21/17





 08:00


 


Intake Total 2815 ml


 


Output Total 940 ml


 


Balance 1875 ml








Physical Exam


Constitutional:  


   Level of Distress:  mild distress


Lungs:  


   Respiratory effort:  no dyspnea, good air movement


   Auscultation:  breath sounds normal, no wheezing


Cardiovascular:  


   Heart Auscultation:  RRR, no murmurs, no rubs, no gallops


Peripheral Pulses:  


   Bruits:  none appreciated


Extremities:  no edema





Data


Laboratory Results:





Last 24 Hours








Test


  1/20/17


12:37 1/20/17


12:40 1/20/17


12:45 1/20/17


13:00


 


Bedside Glucose 427 mg/dl    


 


White Blood Count  15.20 K/uL   


 


Red Blood Count  3.97 M/uL   


 


Hemoglobin  12.1 g/dL   


 


Hematocrit  36.0 %   


 


Mean Corpuscular Volume  90.7 fL   


 


Mean Corpuscular Hemoglobin  30.5 pg   


 


Mean Corpuscular Hemoglobin


Concent 


  33.6 g/dl 


  


  


 


 


Platelet Count  169 K/uL   


 


Mean Platelet Volume  12.0 fL   


 


RDW Standard Deviation  65.2 fL   


 


RDW Coefficient of Variation  19.6 %   


 


Neutrophils % (Manual)  48.2 %   


 


Lymphocytes % (Manual)  7.1 %   


 


Monocytes % (Manual)  42.0 %   


 


Basophils % (Manual)  1.8 %   


 


Myelocytes %  0.9 %   


 


Neutrophils # (Manual)  7.33 K/uL   


 


Total Absolute Neutrophils  7.33 K/uL   


 


Lymphocytes # (Manual)  1.08 K/uL   


 


Total Absolute Lymphocytes  1.08 K/uL   


 


Monocytes # (Manual)  6.38 K/uL   


 


Basophils # (Manual)  0.27 K/uL   


 


Myelocytes #  0.14 K/uL   


 


Giant Platelets  1+   


 


Spherocytes  1+   


 


Prothrombin Time  12.3 SECONDS   


 


Prothromb Time International


Ratio 


  1.1 


  


  


 


 


Activated Partial


Thromboplast Time 


  20.8 SECONDS 


  


  


 


 


Partial Thromboplastin Ratio  0.8   


 


Sodium Level  138 mmol/L   


 


Potassium Level  4.3 mmol/L   


 


Chloride Level  103 mmol/L   


 


Carbon Dioxide Level  19 mmol/L   


 


Anion Gap  16.0 mmol/L   


 


Blood Urea Nitrogen  31 mg/dl   


 


Creatinine  2.50 mg/dl   


 


Estimated GFR (


American) 


  26.9 


  


  


 


 


Estimated GFR (Non-


American 


  23.2 


  


  


 


 


BUN/Creatinine Ratio  12.3   


 


Random Glucose  409 mg/dl   


 


Calcium Level  9.1 mg/dl   


 


Magnesium Level  1.7 mg/dl   


 


Total Bilirubin  0.6 mg/dl   


 


Direct Bilirubin  0.1 mg/dl   


 


Aspartate Amino Transf


(AST/SGOT) 


  23 U/L 


  


  


 


 


Alanine Aminotransferase


(ALT/SGPT) 


  15 U/L 


  


  


 


 


Alkaline Phosphatase  97 U/L   


 


Total Creatine Kinase  104 U/L   


 


Creatine Kinase MB  5.5 ng/ml   


 


Creatine Kinase MB Ratio  5.3   


 


Troponin I  1.070 ng/ml   


 


Total Protein  7.8 gm/dl   


 


Albumin  3.1 gm/dl   


 


Beta-Hydroxybutyric Acid  1.97 mg/dL   


 


Bedside Lactic Acid Venous   4.88 mmol/L  


 


Influenza Type A (RT-PCR)


  


  


  


  Neg for Influ


A


 


Influenza Type B (RT-PCR)


  


  


  


  Neg for Influ


B














Test


  1/20/17


14:56 1/20/17


15:58 1/20/17


16:47 1/20/17


17:04


 


Bedside Glucose 359 mg/dl  304 mg/dl   257 mg/dl 


 


Lactic Acid Level   3.1 mmol/L  


 


Total Creatine Kinase   83 U/L  


 


Creatine Kinase MB   4.0 ng/ml  


 


Creatine Kinase MB Ratio   4.8  


 


Troponin I   0.996 ng/ml  














Test


  1/20/17


18:06 1/20/17


20:38 1/20/17


21:55 1/20/17


23:46


 


Bedside Glucose 204 mg/dl  219 mg/dl   


 


Urine Color   YELLOW  


 


Urine Appearance   CLOUDY  


 


Urine pH   5.0  


 


Urine Specific Gravity   1.014  


 


Urine Protein   TRACE  


 


Urine Glucose (UA)   2+  


 


Urine Ketones   NEG  


 


Urine Occult Blood   3+  


 


Urine Nitrite   NEG  


 


Urine Bilirubin   NEG  


 


Urine Urobilinogen   NEG  


 


Urine Leukocyte Esterase   NEG  


 


Urine WBC (Auto)   1-5 /hpf  


 


Urine RBC (Auto)   0-4 /hpf  


 


Urine Hyaline Casts (Auto)   10-30 /lpf  


 


Urine Epithelial Cells (Auto)   >30 /lpf  


 


Urine Bacteria (Auto)   NEG  


 


Urine Renal Epithelial Cells   0-5 /lpf  


 


Urine Pathogenic Casts


  


  


  1-5 GRANULAR


CASTS /lpf 


 


 


Urine Yeast (Auto)     


 


Total Creatine Kinase    79 U/L 


 


Creatine Kinase MB    3.7 ng/ml 


 


Creatine Kinase MB Ratio    4.7 


 


Troponin I    0.943 ng/ml 














Test


  1/21/17


02:10 1/21/17


04:51 1/21/17


04:57 1/21/17


06:59


 


Activated Partial


Thromboplast Time 69.9 SECONDS 


  


  80.7 SECONDS 


  


 


 


Partial Thromboplastin Ratio 2.7   3.1  


 


White Blood Count  11.13 K/uL   


 


Red Blood Count  3.08 M/uL   


 


Hemoglobin  9.2 g/dL   


 


Hematocrit  27.2 %   


 


Mean Corpuscular Volume  88.3 fL   


 


Mean Corpuscular Hemoglobin  29.9 pg   


 


Mean Corpuscular Hemoglobin


Concent 


  33.8 g/dl 


  


  


 


 


Platelet Count  101 K/uL   


 


Mean Platelet Volume  12.3 fL   


 


RDW Standard Deviation  63.7 fL   


 


RDW Coefficient of Variation  19.8 %   


 


Neutrophils % (Manual)  48.6 %   


 


Lymphocytes % (Manual)  7.0 %   


 


Monocytes % (Manual)  40.9 %   


 


Basophils % (Manual)  0.9 %   


 


Metamyelocytes %  1.7 %   


 


Myelocytes %  0.9 %   


 


Neutrophils # (Manual)  5.41 K/uL   


 


Total Absolute Neutrophils  5.41 K/uL   


 


Lymphocytes # (Manual)  0.78 K/uL   


 


Total Absolute Lymphocytes  0.78 K/uL   


 


Monocytes # (Manual)  4.55 K/uL   


 


Basophils # (Manual)  0.10 K/uL   


 


Metamyelocytes #  0.19 K/uL   


 


Myelocytes #  0.10 K/uL   


 


Prothrombin Time   13.6 SECONDS  


 


Prothromb Time International


Ratio 


  


  1.3 


  


 


 


Sodium Level   142 mmol/L  


 


Potassium Level   3.7 mmol/L  


 


Chloride Level   107 mmol/L  


 


Carbon Dioxide Level   23 mmol/L  


 


Anion Gap   12.0 mmol/L  


 


Blood Urea Nitrogen   36 mg/dl  


 


Creatinine   2.50 mg/dl  


 


Est Creatinine Clear Calc


Drug Dose 


  


  23.9 ml/min 


  


 


 


Estimated GFR (


American) 


  


  26.9 


  


 


 


Estimated GFR (Non-


American 


  


  23.2 


  


 


 


BUN/Creatinine Ratio   14.5  


 


Random Glucose   205 mg/dl  


 


Calcium Level   7.7 mg/dl  


 


Phosphorus Level   4.0 mg/dl  


 


Magnesium Level   1.8 mg/dl  


 


Random Vancomycin Level   17.6 mcg/ml  


 


Bedside Glucose    208 mg/dl 














Test


  1/21/17


07:25 


  


  


 


 


Total Creatine Kinase 83 U/L    


 


Creatine Kinase MB 3.2 ng/ml    


 


Creatine Kinase MB Ratio 3.9    


 


Troponin I 0.764 ng/ml    











EKG: Today SB, no acute change





Telemetry reviewed:  SR, no significant arrhythmia





Assessment and Plan


#1. Elevated cardiac enzymes: His troponin is slightly elevated but the pattern 

is descending, that could be from the chest discomfort he had the day before 

admission, it could be due to demand ischemia due to his current pulmonary 

condition. He does not have anything to suggest an acute myocardial infarction 

now (no diagnostic electrocardiographic changes and no discomfort currently) 

therefore I would not consider invasive evaluation. I am going to stop heparin. 





#2. Coronary disease: He has coronary artery disease, he has significant 

difficulty with exertion which could be an anginal equivalent although he 

typically does not have chest discomfort with exertion. He did of chest 

discomfort 1/19/2017 without exertion. We may have to consider stress testing 

but only if we would plan on further invasive evaluation. Certainly I would not 

do that now with his pulmonary condition.





#3. Chronic kidney disease: He has long-standing kidney disease and his 

creatinine is stable at 2.5. That is roughly stable compared to prior values. 

This may interfere with our ability to perform invasive evaluation.





Thank you for allowing me to participate in his care.

## 2017-01-21 NOTE — UROLOGY CONSULTATION
Urology Consultation


Patient is an 81-year-old white male admitted to the hospital with shortness of 

breath increasing lethargy.  We are asked to see the patient because of a very 

tight phimotic foreskin


Patient says he has had troubles voiding for 3 or 4 years he will basically 

voided into the foreskin pocket and then basically manipulate the end of the 

penis to drain the urine


The concern was that he is probably going to need diuresis and if a Alvarez 

catheter as needed was impossible to get the catheter in due to the extremely 

tight phimosis


On exam the patient did have a very tight phimosis with just a pinpoint opening 

was unable to place a catheter blindly even through the foreskin.


Gave options the patient he could continue like he had been oriented have 

either a dorsal slit at the bedside or circumcision on Monday in the operating 

room


Decided to have the dorsal slit at the bedside


Patient's phallus was then prepped with Betadine and draped in a sterile 

fashion penile block was done with 1% plain lidocaine the foreskin was crushed 

with a clamp at the 12:00 and incised sharply.  This was done down to the 

coronal sulcus.  The 2 cut edges were then reapproximated with a running 3-0 

chromic for hemostasis.  After completing the procedure the wound was washed 

and dried and dry sterile dressing was applied he will have antibiotic ointment 

placed on the incision twice a day is currently on an antibiotic at the end of 

the procedure could easily see the meatus and this should help him with voiding 

problems that he was having

## 2017-01-21 NOTE — NEPHROLOGY PROGRESS NOTE
Nephrology Progress Note


Date of Service


Jan 21, 2017.





Chief Complaint


Chronic renal insufficiency





Subjective


No acute events overnight.  Mr. Hummel has no complaints this morning.  He 

states that he feels well but admits to some persistent dyspnea.  No fevers or 

chills.  Denies cough.  Plan of care discussed with Dr. Sharma this morning.  

Heparin gtt discontinued.  Appetite fair.  Nursing reports notable dyspnea with 

minimal exertion.  Patient dribbles urine due to phimosis.  Oxygen supplied 

this morning for comfort.





Review of Systems


A complete review of systems was performed.  Pertinent positives are noted 

above. All other systems are negative.





Vital Signs





Last 8 Hrs








  Date Time  Temp Pulse Resp B/P Pulse Ox O2 Delivery O2 Flow Rate FiO2


 


1/21/17 08:04      Nasal Cannula 2.0 


 


1/21/17 07:30  55 16  93 Room Air  


 


1/21/17 07:30 36.9 56 20 142/68 94   


 


1/21/17 04:12 36.6 71 18 115/55 96   


 


1/21/17 04:00     96 Room Air  


 


1/21/17 03:00  58 16  90 Room Air  











I & O











 24-Hour Column 


 


 1/21/17





 08:00


 


Intake Total 2815 ml


 


Output Total 940 ml


 


Balance 1875 ml











Last Recorded Weight


Weight (Kilograms):  88.200





Physical Exam


General Appearance:  no apparent distress, + thin


Head:  normocephalic, atraumatic


Eyes:  normal inspection, sclerae normal


ENT:  normal ENT inspection, pharynx normal


Neck:  supple, no JVD


Respiratory/Chest:  chest non-tender, + decreased breath sounds, + pertinent 

finding (tight with slight wheeze)


Cardiovascular:  regular rate, rhythm, no murmur


Abdomen/GI:  non tender, soft


Extremities/Musculoskelatal:  normal inspection, no pedal edema


Neurologic/Psych:  alert, oriented x 3





Family History





Heart disease





Social History


Smokeless Tobacco Use:  No


Alcohol Use:  none


Drug Use:  none


Marital Status:  


Housing Status:  lives with family


Occupation:  retired





Laboratory Results


Past 24 Hours


1/20/17 12:40








Red Blood Count 3.97, Mean Corpuscular Volume 90.7, Mean Corpuscular Hemoglobin 

30.5, Mean Corpuscular Hemoglobin Concent 33.6, Mean Platelet Volume 12.0





1/21/17 04:51








Red Blood Count 3.08, Mean Corpuscular Volume 88.3, Mean Corpuscular Hemoglobin 

29.9, Mean Corpuscular Hemoglobin Concent 33.8, Mean Platelet Volume 12.3





1/20/17 12:40








1/21/17 04:57

















Test


  1/20/17


12:37 1/20/17


12:40 1/20/17


12:45 1/20/17


13:00


 


Bedside Glucose


  427 mg/dl


(70-99) 


  


  


 


 


White Blood Count


  


  15.20 K/uL


(4.8-10.8) 


  


 


 


Red Blood Count


  


  3.97 M/uL


(4.7-6.1) 


  


 


 


Hemoglobin


  


  12.1 g/dL


(14.0-18.0) 


  


 


 


Hematocrit  36.0 % (42-52)   


 


Mean Corpuscular Volume


  


  90.7 fL


() 


  


 


 


Mean Corpuscular Hemoglobin


  


  30.5 pg


(25-34) 


  


 


 


Mean Corpuscular Hemoglobin


Concent 


  33.6 g/dl


(32-36) 


  


 


 


Platelet Count


  


  169 K/uL


(130-400) 


  


 


 


Mean Platelet Volume


  


  12.0 fL


(7.4-10.4) 


  


 


 


RDW Standard Deviation


  


  65.2 fL


(36.4-46.3) 


  


 


 


RDW Coefficient of Variation


  


  19.6 %


(11.5-14.5) 


  


 


 


Neutrophils % (Manual)  48.2 %   


 


Lymphocytes % (Manual)  7.1 %   


 


Monocytes % (Manual)  42.0 %   


 


Basophils % (Manual)  1.8 % (0-2)   


 


Myelocytes %  0.9 %   


 


Neutrophils # (Manual)


  


  7.33 K/uL


(1.4-6.5) 


  


 


 


Total Absolute Neutrophils


  


  7.33 K/uL


(1.4-6.5) 


  


 


 


Lymphocytes # (Manual)


  


  1.08 K/uL


(1.2-3.4) 


  


 


 


Total Absolute Lymphocytes


  


  1.08 K/uL


(1.2-3.4) 


  


 


 


Monocytes # (Manual)


  


  6.38 K/uL


(0.11-0.59) 


  


 


 


Basophils # (Manual)


  


  0.27 K/uL


(0-0.2) 


  


 


 


Myelocytes #


  


  0.14 K/uL


(0-0) 


  


 


 


Giant Platelets  1+   


 


Spherocytes  1+   


 


Prothrombin Time


  


  12.3 SECONDS


(9.0-12.0) 


  


 


 


Prothromb Time International


Ratio 


  1.1 (0.9-1.1) 


  


  


 


 


Activated Partial


Thromboplast Time 


  20.8 SECONDS


(21.0-31.0) 


  


 


 


Partial Thromboplastin Ratio  0.8   


 


Anion Gap


  


  16.0 mmol/L


(3-11) 


  


 


 


Estimated GFR (


American) 


  26.9 


  


  


 


 


Estimated GFR (Non-


American 


  23.2 


  


  


 


 


BUN/Creatinine Ratio  12.3 (10-20)   


 


Calcium Level


  


  9.1 mg/dl


(8.5-10.1) 


  


 


 


Magnesium Level


  


  1.7 mg/dl


(1.8-2.4) 


  


 


 


Total Bilirubin


  


  0.6 mg/dl


(0.2-1) 


  


 


 


Direct Bilirubin


  


  0.1 mg/dl


(0-0.2) 


  


 


 


Aspartate Amino Transf


(AST/SGOT) 


  23 U/L (15-37) 


  


  


 


 


Alanine Aminotransferase


(ALT/SGPT) 


  15 U/L (12-78) 


  


  


 


 


Alkaline Phosphatase


  


  97 U/L


() 


  


 


 


Total Creatine Kinase


  


  104 U/L


() 


  


 


 


Creatine Kinase MB


  


  5.5 ng/ml


(0.5-3.6) 


  


 


 


Creatine Kinase MB Ratio  5.3 (0-3.0)   


 


Troponin I


  


  1.070 ng/ml


(0-0.045) 


  


 


 


Total Protein


  


  7.8 gm/dl


(6.4-8.2) 


  


 


 


Albumin


  


  3.1 gm/dl


(3.4-5.0) 


  


 


 


Beta-Hydroxybutyric Acid


  


  1.97 mg/dL


(0.2-2.81) 


  


 


 


Bedside Lactic Acid Venous


  


  


  4.88 mmol/L


(0.90-1.70) 


 


 


Influenza Type A (RT-PCR)


  


  


  


  Neg for Influ


A (NEG)


 


Influenza Type B (RT-PCR)


  


  


  


  Neg for Influ


B (NEG)














Test


  1/20/17


14:56 1/20/17


15:58 1/20/17


16:47 1/20/17


17:04


 


Bedside Glucose


  359 mg/dl


(70-99) 304 mg/dl


(70-99) 


  257 mg/dl


(70-99)


 


Lactic Acid Level


  


  


  3.1 mmol/L


(0.4-2.0) 


 


 


Total Creatine Kinase


  


  


  83 U/L


() 


 


 


Creatine Kinase MB


  


  


  4.0 ng/ml


(0.5-3.6) 


 


 


Creatine Kinase MB Ratio   4.8 (0-3.0)  


 


Troponin I


  


  


  0.996 ng/ml


(0-0.045) 


 














Test


  1/20/17


18:06 1/20/17


20:38 1/20/17


21:55 1/20/17


23:46


 


Bedside Glucose


  204 mg/dl


(70-99) 219 mg/dl


(70-99) 


  


 


 


Urine Color   YELLOW  


 


Urine Appearance   CLOUDY (CLEAR)  


 


Urine pH   5.0 (4.5-7.5)  


 


Urine Specific Gravity


  


  


  1.014


(1.000-1.030) 


 


 


Urine Protein   TRACE (NEG)  


 


Urine Glucose (UA)   2+ (NEG)  


 


Urine Ketones   NEG (NEG)  


 


Urine Occult Blood   3+ (NEG)  


 


Urine Nitrite   NEG (NEG)  


 


Urine Bilirubin   NEG (NEG)  


 


Urine Urobilinogen   NEG (NEG)  


 


Urine Leukocyte Esterase   NEG (NEG)  


 


Urine WBC (Auto)   1-5 /hpf (0-5)  


 


Urine RBC (Auto)   0-4 /hpf (0-4)  


 


Urine Hyaline Casts (Auto)


  


  


  10-30 /lpf


(0-5) 


 


 


Urine Epithelial Cells (Auto)   >30 /lpf (0-5)  


 


Urine Bacteria (Auto)   NEG (NEG)  


 


Urine Renal Epithelial Cells   0-5 /lpf (0-5)  


 


Urine Pathogenic Casts


  


  


  1-5 GRANULAR


CASTS /lpf (0) 


 


 


Urine Yeast (Auto)    (NONE PRSENT)  


 


Total Creatine Kinase


  


  


  


  79 U/L


()


 


Creatine Kinase MB


  


  


  


  3.7 ng/ml


(0.5-3.6)


 


Creatine Kinase MB Ratio    4.7 (0-3.0) 


 


Troponin I


  


  


  


  0.943 ng/ml


(0-0.045)














Test


  1/21/17


02:10 1/21/17


04:51 1/21/17


04:57 1/21/17


06:59


 


Activated Partial


Thromboplast Time 69.9 SECONDS


(21.0-31.0) 


  80.7 SECONDS


(21.0-31.0) 


 


 


Partial Thromboplastin Ratio 2.7   3.1  


 


White Blood Count


  


  11.13 K/uL


(4.8-10.8) 


  


 


 


Red Blood Count


  


  3.08 M/uL


(4.7-6.1) 


  


 


 


Hemoglobin


  


  9.2 g/dL


(14.0-18.0) 


  


 


 


Hematocrit  27.2 % (42-52)   


 


Mean Corpuscular Volume


  


  88.3 fL


() 


  


 


 


Mean Corpuscular Hemoglobin


  


  29.9 pg


(25-34) 


  


 


 


Mean Corpuscular Hemoglobin


Concent 


  33.8 g/dl


(32-36) 


  


 


 


Platelet Count


  


  101 K/uL


(130-400) 


  


 


 


Mean Platelet Volume


  


  12.3 fL


(7.4-10.4) 


  


 


 


RDW Standard Deviation


  


  63.7 fL


(36.4-46.3) 


  


 


 


RDW Coefficient of Variation


  


  19.8 %


(11.5-14.5) 


  


 


 


Neutrophils % (Manual)  48.6 %   


 


Lymphocytes % (Manual)  7.0 %   


 


Monocytes % (Manual)  40.9 %   


 


Basophils % (Manual)  0.9 % (0-2)   


 


Metamyelocytes %  1.7 %   


 


Myelocytes %  0.9 %   


 


Neutrophils # (Manual)


  


  5.41 K/uL


(1.4-6.5) 


  


 


 


Total Absolute Neutrophils


  


  5.41 K/uL


(1.4-6.5) 


  


 


 


Lymphocytes # (Manual)


  


  0.78 K/uL


(1.2-3.4) 


  


 


 


Total Absolute Lymphocytes


  


  0.78 K/uL


(1.2-3.4) 


  


 


 


Monocytes # (Manual)


  


  4.55 K/uL


(0.11-0.59) 


  


 


 


Basophils # (Manual)


  


  0.10 K/uL


(0-0.2) 


  


 


 


Metamyelocytes #


  


  0.19 K/uL


(0-0) 


  


 


 


Myelocytes #


  


  0.10 K/uL


(0-0) 


  


 


 


Prothrombin Time


  


  


  13.6 SECONDS


(9.0-12.0) 


 


 


Prothromb Time International


Ratio 


  


  1.3 (0.9-1.1) 


  


 


 


Anion Gap


  


  


  12.0 mmol/L


(3-11) 


 


 


Est Creatinine Clear Calc


Drug Dose 


  


  23.9 ml/min 


  


 


 


Estimated GFR (


American) 


  


  26.9 


  


 


 


Estimated GFR (Non-


American 


  


  23.2 


  


 


 


BUN/Creatinine Ratio   14.5 (10-20)  


 


Calcium Level


  


  


  7.7 mg/dl


(8.5-10.1) 


 


 


Phosphorus Level


  


  


  4.0 mg/dl


(2.5-4.9) 


 


 


Magnesium Level


  


  


  1.8 mg/dl


(1.8-2.4) 


 


 


Random Vancomycin Level   17.6 mcg/ml  


 


Bedside Glucose


  


  


  


  208 mg/dl


(70-99)














Test


  1/21/17


07:25 


  


  


 


 


Total Creatine Kinase


  83 U/L


() 


  


  


 


 


Creatine Kinase MB


  3.2 ng/ml


(0.5-3.6) 


  


  


 


 


Creatine Kinase MB Ratio 3.9 (0-3.0)    


 


Troponin I


  0.764 ng/ml


(0-0.045) 


  


  


 














 Date/Time


Source Procedure


Growth Status


 


 


 1/20/17 17:30


Nasal MRSA DNA Surveillance Screen - Final


Specimen Negative for MRSA by DNA Probe Complete











Allergies


Coded Allergies:  


     No Known Allergies (Verified , 1/20/17)





Medications





 Current Inpatient Medications








 Medications


  (Trade)  Dose


 Ordered  Sig/[place]


 Route  Start Time


 Stop Time Status Last Admin


Dose Admin


 


 Atorvastatin


 Calcium


  (Lipitor Tab)  40 mg  DAILY


 PO  1/21/17 09:00


 2/20/17 08:59  1/21/17 08:04


40 MG


 


 Cholecalciferol


  (Vitamin D Tab)  1,000


 inter.unit  DAILY


 PO  1/21/17 09:00


 2/20/17 08:59  1/21/17 08:04


1,000 INTER.UNIT


 


 Clopidogrel


 Bisulfate


  (plAVix TAB)  75 mg  DAILY


 PO  1/21/17 09:00


 2/20/17 08:59  1/21/17 08:04


75 MG


 


 Cyanocobalamin


  (Vitamin B-12


 Tab)  1,000 mcg  DAILY


 PO  1/21/17 09:00


 2/20/17 08:59  1/21/17 08:05


1,000 MCG


 


 Meclizine HCl 25


 mg  25 mg  Q6


 PO  1/20/17 18:00


 2/19/17 17:59  1/21/17 06:07


25 MG


 


 Pantoprazole


 Sodium 40 mg/


 Syringe  10 ml @ 5


 mls/min  DAILY@11


 IV  1/21/17 11:00


 2/20/17 10:59   


 


 


 Sodium Chloride


  (Nss 1000ml)  1,000 ml @ 


 100 mls/hr  Q10H


 IV  1/20/17 15:12


 2/19/17 15:11  1/21/17 01:24


100 MLS/HR


 


 Acetaminophen


  (Tylenol Tab)  650 mg  Q4H  PRN


 PO  1/20/17 15:15


 2/19/17 15:14  1/21/17 04:14


650 MG


 


 Zolpidem Tartrate


  (Ambien Tab)  5 mg  HSZ  PRN


 PO  1/20/17 15:15


 2/19/17 15:14   


 


 


 Nitroglycerin


  (Nitrostat Tab)  0.4 mg  UD  PRN


 SL  1/20/17 15:15


 2/19/17 15:14   


 


 


 Lorazepam


  (Ativan Inj)  0.5 mg  Q4H  PRN


 IV  1/20/17 15:15


 2/19/17 15:14   


 


 


 Magnesium


 Hydroxide


  (Milk Of


 Magnesia Susp)  30 ml  Q6H  PRN


 PO  1/20/17 15:15


 2/19/17 15:14   


 


 


 Bisacodyl


  (Dulcolax Supp)  10 mg  DAILY  PRN


 WI  1/20/17 15:15


 2/19/17 15:14   


 


 


 Diphenhydramine


 HCl


  (Benadryl Inj)  25 mg  Q4H  PRN


 IV  1/20/17 15:15


 2/19/17 15:14   


 


 


 Al Hydrox/Mg


 Hydrox/


 Simethicone 15 ml  15 ml  Q4H  PRN


 PO  1/20/17 15:15


 2/19/17 15:14   


 


 


 Promethazine HCl/


 Sodium Chloride


  (Phenergan Inj/


 Nss 50ml)  50.5 ml @ 


 202 mls/hr  Q4H  PRN


 IV  1/20/17 15:15


 2/19/17 15:14   


 


 


 Ondansetron HCl


  (Zofran Inj)  4 mg  Q6H  PRN


 IV  1/20/17 15:15


 2/19/17 15:14   


 


 


 Docusate Sodium


  (coLACE CAP)  100 mg  BID


 PO  1/20/17 21:00


 2/19/17 20:59  1/21/17 08:05


100 MG


 


 Morphine Sulfate


  (MoRPHine


 SULFATE INJ)  2 mg  Q2H  PRN


 IV  1/20/17 15:15


 2/3/17 15:14   


 


 


 Insulin Aspart


  (novoLOG ASPART)  **SLIDING


 SCALE**


 **If C...  ACHS


 SC  1/20/17 16:00


 2/19/17 15:59  1/21/17 07:00


10 UNITS


 


 Glucose


  (Glucose 40% Gel)    UD  PRN


 PO  1/20/17 15:15


 2/19/17 15:14   


 


 


 Glucose


  (Glucose Chew


 Tab)  1 tabs  UD  PRN


 PO  1/20/17 15:15


 2/19/17 15:14   


 


 


 Dextrose


  (Dextrose 50%


 50ML Syringe)  50 ml  UD  PRN


 IV  1/20/17 15:15


 2/19/17 15:14   


 


 


 Glucagon


  (Glucagon Inj)  1 mg  UD  PRN


 SQ  1/20/17 15:15


 2/19/17 15:14   


 


 


 Insulin Glargine


 20 unit  20 unit  QAM


 SQ  1/21/17 09:00


 2/20/17 08:59  1/21/17 08:11


20 UNIT


 


 Piperacillin Sod/


 Tazobactam Sod


 3.375 gm/Dextrose  115 ml @ 


 28.75 mls/


 hr  Q8H


 IV  1/20/17 20:00


 1/27/17 19:59  1/21/17 04:12


28.75 MLS/HR


 


 Levofloxacin/Prmx


  (Levaquin / D5W/


 Premixed D5W)  150 ml @ 


 100 mls/hr  Q2D@1400


 IV  1/22/17 14:00


 1/26/17 23:59   


 


 


 Guaifenesin


  (Organidin Nr


 Tab)  600 mg  BID


 PO  1/20/17 21:00


 2/19/17 20:59  1/21/17 08:04


600 MG


 


 Budesonide


  (Pulmicort


 Respules 0.5MG/


 2ML Neb Soln)  0.5 mg  BIDR


 INH  1/20/17 20:00


 2/19/17 19:59  1/21/17 07:30


0.5 MG


 


 Levofloxacin


  (Consult)  1 ea  UD  PRN


 N/A  1/20/17 16:15


 2/19/17 16:14   


 


 


 Piperacillin Sod/


 Tazobactam Sod


  (Consult)  1 ea  UD  PRN


 N/A  1/20/17 16:15


 2/19/17 16:14   


 


 


 Vancomycin HCl


  (Consult)  1 ea  UD  PRN


 N/A  1/20/17 16:15


 2/19/17 16:14   


 


 


 Ipratropium


 Bromide


  (Atrovent 0.02%


 0.5MG/2.5ML Neb)  0.5 mg  Q6R


 INH  1/20/17 21:00


 2/19/17 20:59  1/21/17 07:30


0.5 MG


 


 Levalbuterol


  (Xopenex 1.25MG/


 0.5ML Neb)  1.25 mg  Q6R


 INH  1/20/17 21:00


 2/19/17 20:59  1/21/17 07:30


1.25 MG











Impression





(1) CKD (chronic kidney disease), stage IV


(2) Acute kidney injury superimposed on chronic kidney disease


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


(4) ACS (acute coronary syndrome)


Mr. Chase Hummel is an 80-year-old male with atherosclerotic coronary artery 

disease, diabetes mellitus, hypertension, dyslipidemia, monoclonal gammopathy 

with chronic anemia and thrombocytopenia and chronic kidney disease IV.   

Baseline creatinine ~2.0-2.2 mg/dL.  He has stated that he would not consider 

hemodialysis during prior discussions.  He has an extensive history of 

cardiovascular disease.  He presented to the ED with hypotension, hypoglycemia, 

generalized weakness, shortness of breath.  He has been struggling recently 

with right knee prepatellar bursitis.  He was admitted to rule-out ACS with 

hyperglycemia and some evidence of acute on chronic renal insufficiency in 

addition to possible sepsis.





Recommendations


Acute renal insufficiency:


-- Renal function stable


-- Will stop IV saline infusion


-- Encourage oral intake to encourage even to slightly positive fluid balance


-- Check renal US.  If there are no signs of obstruction then there is no need 

for Alvarez placement


-- Hold ACEi


-- Check vanco level prior to next dose





Shortness of breath:


-- Clinically consistent with a viral respiratory syndrome


-- Agree with continued use of bronchodilators





r/o ACS:


-- Enzymes trending down


-- No chest pain


-- Discussed with cardiology this AM


-- No further interventions required at this time





Sepsis with bursitis:


-- Knee does not appear infected on exam but if limiting mobility may certainly 

need to be aspirated





Chronic kidney disease stage IV A2 in the setting of vascular disease, diabetes 

mellitus and hypertension. 


--Mr. Hummel has expressed multiple times in the past that he would not consider 

dialysis under any circumstances.  





Hypotension:


-- Improved with volume replacement


-- ACEi held





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  


-- Stable





BPH with chronic lower urinary tract symptoms and phimosis:


-- Check renal US and if acceptable no need for Alvarez placement

## 2017-01-22 VITALS
HEART RATE: 71 BPM | DIASTOLIC BLOOD PRESSURE: 70 MMHG | OXYGEN SATURATION: 94 % | TEMPERATURE: 98.06 F | SYSTOLIC BLOOD PRESSURE: 143 MMHG

## 2017-01-22 VITALS
TEMPERATURE: 98.24 F | OXYGEN SATURATION: 96 % | SYSTOLIC BLOOD PRESSURE: 145 MMHG | DIASTOLIC BLOOD PRESSURE: 61 MMHG | HEART RATE: 58 BPM

## 2017-01-22 VITALS
TEMPERATURE: 97.88 F | OXYGEN SATURATION: 93 % | SYSTOLIC BLOOD PRESSURE: 171 MMHG | DIASTOLIC BLOOD PRESSURE: 75 MMHG | HEART RATE: 107 BPM

## 2017-01-22 VITALS — HEART RATE: 80 BPM | OXYGEN SATURATION: 89 %

## 2017-01-22 VITALS
DIASTOLIC BLOOD PRESSURE: 74 MMHG | SYSTOLIC BLOOD PRESSURE: 144 MMHG | HEART RATE: 63 BPM | TEMPERATURE: 98.06 F | OXYGEN SATURATION: 93 %

## 2017-01-22 VITALS
SYSTOLIC BLOOD PRESSURE: 124 MMHG | HEART RATE: 58 BPM | TEMPERATURE: 97.7 F | OXYGEN SATURATION: 95 % | DIASTOLIC BLOOD PRESSURE: 72 MMHG

## 2017-01-22 VITALS
SYSTOLIC BLOOD PRESSURE: 157 MMHG | DIASTOLIC BLOOD PRESSURE: 78 MMHG | TEMPERATURE: 97.88 F | OXYGEN SATURATION: 96 % | HEART RATE: 65 BPM

## 2017-01-22 VITALS — HEART RATE: 73 BPM | OXYGEN SATURATION: 97 %

## 2017-01-22 VITALS — OXYGEN SATURATION: 93 % | DIASTOLIC BLOOD PRESSURE: 74 MMHG | HEART RATE: 94 BPM | SYSTOLIC BLOOD PRESSURE: 167 MMHG

## 2017-01-22 VITALS — HEART RATE: 79 BPM | OXYGEN SATURATION: 93 %

## 2017-01-22 VITALS — OXYGEN SATURATION: 93 % | HEART RATE: 64 BPM

## 2017-01-22 VITALS
OXYGEN SATURATION: 93 % | TEMPERATURE: 97.88 F | HEART RATE: 64 BPM | SYSTOLIC BLOOD PRESSURE: 129 MMHG | DIASTOLIC BLOOD PRESSURE: 68 MMHG

## 2017-01-22 VITALS — HEART RATE: 69 BPM | OXYGEN SATURATION: 94 %

## 2017-01-22 VITALS — OXYGEN SATURATION: 96 % | HEART RATE: 66 BPM

## 2017-01-22 LAB
ANION GAP SERPL CALC-SCNC: 13 MMOL/L (ref 3–11)
ANISOCYTOSIS BLD QL SMEAR: PRESENT
BUN SERPL-MCNC: 35 MG/DL (ref 7–18)
BUN/CREAT SERPL: 12.3 (ref 10–20)
CALCIUM SERPL-MCNC: 7.9 MG/DL (ref 8.5–10.1)
CHLORIDE SERPL-SCNC: 107 MMOL/L (ref 98–107)
CO2 SERPL-SCNC: 20 MMOL/L (ref 21–32)
COMPLETE: YES
CREAT CL PREDICTED SERPL C-G-VRATE: 23.2 ML/MIN
CREAT SERPL-MCNC: 2.8 MG/DL (ref 0.6–1.4)
EOSINOPHIL NFR BLD AUTO: 98 K/UL (ref 130–400)
EOSINOPHIL NFR BLD: 0.9 %
GLUCOSE SERPL-MCNC: 262 MG/DL (ref 70–99)
HCT VFR BLD CALC: 26.8 % (ref 42–52)
INR PPP: 1.2 (ref 0.9–1.1)
LYMPHOCYTES # BLD: 0.73 K/UL (ref 1.2–3.4)
LYMPHOCYTES NFR BLD: 3.5 %
MAGNESIUM SERPL-MCNC: 1.7 MG/DL (ref 1.8–2.4)
MCH RBC QN AUTO: 29.4 PG (ref 25–34)
MCHC RBC AUTO-ENTMCNC: 32.8 G/DL (ref 32–36)
MCV RBC AUTO: 89.6 FL (ref 80–100)
NEUTROPHILS NFR BLD AUTO: 66.7 %
PARTIAL THROMBOPLASTIN RATIO: 1.2
PARTIAL THROMBOPLASTIN RATIO: 4
PLATELET # BLD EST: (no result) 10*3/UL
POTASSIUM SERPL-SCNC: 4.4 MMOL/L (ref 3.5–5.1)
PROTHROMBIN TIME: 13.3 SECONDS (ref 9–12)
RBC # BLD AUTO: 2.99 M/UL (ref 4.7–6.1)
SODIUM SERPL-SCNC: 140 MMOL/L (ref 136–145)
WBC # BLD AUTO: 20.96 K/UL (ref 4.8–10.8)

## 2017-01-22 PROCEDURE — 0M9P3ZX DRAINAGE OF LEFT KNEE BURSA AND LIGAMENT, PERCUTANEOUS APPROACH, DIAGNOSTIC: ICD-10-PCS | Performed by: ORTHOPAEDIC SURGERY

## 2017-01-22 RX ADMIN — INSULIN ASPART SCH UNITS: 100 INJECTION, SOLUTION INTRAVENOUS; SUBCUTANEOUS at 20:25

## 2017-01-22 RX ADMIN — MECLIZINE HYDROCHLORIDE SCH MG: 25 TABLET ORAL at 17:32

## 2017-01-22 RX ADMIN — BACITRACIN SCH APPLN: 500 OINTMENT TOPICAL at 20:14

## 2017-01-22 RX ADMIN — IPRATROPIUM BROMIDE SCH MG: 0.5 SOLUTION RESPIRATORY (INHALATION) at 03:25

## 2017-01-22 RX ADMIN — Medication SCH INTER.UNIT: at 08:34

## 2017-01-22 RX ADMIN — IPRATROPIUM BROMIDE SCH MG: 0.5 SOLUTION RESPIRATORY (INHALATION) at 20:10

## 2017-01-22 RX ADMIN — IPRATROPIUM BROMIDE SCH MG: 0.5 SOLUTION RESPIRATORY (INHALATION) at 07:18

## 2017-01-22 RX ADMIN — INSULIN ASPART SCH UNITS: 100 INJECTION, SOLUTION INTRAVENOUS; SUBCUTANEOUS at 16:15

## 2017-01-22 RX ADMIN — DOCUSATE SODIUM SCH MG: 100 CAPSULE, LIQUID FILLED ORAL at 20:15

## 2017-01-22 RX ADMIN — DOCUSATE SODIUM SCH MG: 100 CAPSULE, LIQUID FILLED ORAL at 08:34

## 2017-01-22 RX ADMIN — IPRATROPIUM BROMIDE SCH MG: 0.5 SOLUTION RESPIRATORY (INHALATION) at 11:09

## 2017-01-22 RX ADMIN — LEVALBUTEROL SCH MG: 1.25 SOLUTION, CONCENTRATE RESPIRATORY (INHALATION) at 20:10

## 2017-01-22 RX ADMIN — BUDESONIDE SCH MG: 0.5 SUSPENSION RESPIRATORY (INHALATION) at 07:19

## 2017-01-22 RX ADMIN — NITROGLYCERIN SCH PATCH: 80 PATCH TRANSDERMAL at 08:34

## 2017-01-22 RX ADMIN — PIPERACILLIN AND TAZOBACTAM SCH MLS/HR: 3; .375 INJECTION, POWDER, LYOPHILIZED, FOR SOLUTION INTRAVENOUS; PARENTERAL at 11:18

## 2017-01-22 RX ADMIN — PANTOPRAZOLE SODIUM SCH MLS/MIN: 40 INJECTION, POWDER, FOR SOLUTION INTRAVENOUS at 10:45

## 2017-01-22 RX ADMIN — BUDESONIDE SCH MG: 0.5 SUSPENSION RESPIRATORY (INHALATION) at 20:10

## 2017-01-22 RX ADMIN — LEVALBUTEROL SCH MG: 1.25 SOLUTION, CONCENTRATE RESPIRATORY (INHALATION) at 03:25

## 2017-01-22 RX ADMIN — GUAIFENESIN SCH MG: 200 TABLET ORAL at 08:34

## 2017-01-22 RX ADMIN — IPRATROPIUM BROMIDE SCH MG: 0.5 SOLUTION RESPIRATORY (INHALATION) at 23:20

## 2017-01-22 RX ADMIN — Medication SCH MCG: at 08:34

## 2017-01-22 RX ADMIN — MECLIZINE HYDROCHLORIDE SCH MG: 25 TABLET ORAL at 06:03

## 2017-01-22 RX ADMIN — BACITRACIN SCH APPLN: 500 OINTMENT TOPICAL at 08:36

## 2017-01-22 RX ADMIN — MORPHINE SULFATE PRN MG: 2 INJECTION, SOLUTION INTRAMUSCULAR; INTRAVENOUS at 09:25

## 2017-01-22 RX ADMIN — LEVALBUTEROL SCH MG: 1.25 SOLUTION, CONCENTRATE RESPIRATORY (INHALATION) at 23:20

## 2017-01-22 RX ADMIN — Medication SCH MG: at 11:00

## 2017-01-22 RX ADMIN — ATORVASTATIN CALCIUM SCH MG: 40 TABLET, FILM COATED ORAL at 08:34

## 2017-01-22 RX ADMIN — MECLIZINE HYDROCHLORIDE SCH MG: 25 TABLET ORAL at 11:16

## 2017-01-22 RX ADMIN — IPRATROPIUM BROMIDE SCH MG: 0.5 SOLUTION RESPIRATORY (INHALATION) at 15:13

## 2017-01-22 RX ADMIN — LEVALBUTEROL SCH MG: 1.25 SOLUTION, CONCENTRATE RESPIRATORY (INHALATION) at 07:18

## 2017-01-22 RX ADMIN — PIPERACILLIN AND TAZOBACTAM SCH MLS/HR: 3; .375 INJECTION, POWDER, LYOPHILIZED, FOR SOLUTION INTRAVENOUS; PARENTERAL at 20:17

## 2017-01-22 RX ADMIN — INSULIN ASPART SCH UNITS: 100 INJECTION, SOLUTION INTRAVENOUS; SUBCUTANEOUS at 11:00

## 2017-01-22 RX ADMIN — MECLIZINE HYDROCHLORIDE SCH MG: 25 TABLET ORAL at 00:00

## 2017-01-22 RX ADMIN — GUAIFENESIN SCH MG: 200 TABLET ORAL at 20:16

## 2017-01-22 RX ADMIN — HEPARIN SODIUM SCH UNIT: 10000 INJECTION, SOLUTION INTRAVENOUS; SUBCUTANEOUS at 06:03

## 2017-01-22 RX ADMIN — INSULIN GLARGINE SCH UNIT: 100 INJECTION, SOLUTION SUBCUTANEOUS at 08:40

## 2017-01-22 RX ADMIN — INSULIN ASPART SCH UNITS: 100 INJECTION, SOLUTION INTRAVENOUS; SUBCUTANEOUS at 07:00

## 2017-01-22 RX ADMIN — LEVALBUTEROL SCH MG: 1.25 SOLUTION, CONCENTRATE RESPIRATORY (INHALATION) at 15:13

## 2017-01-22 RX ADMIN — CLOPIDOGREL BISULFATE SCH MG: 75 TABLET, FILM COATED ORAL at 08:34

## 2017-01-22 RX ADMIN — LEVALBUTEROL SCH MG: 1.25 SOLUTION, CONCENTRATE RESPIRATORY (INHALATION) at 11:09

## 2017-01-22 RX ADMIN — PIPERACILLIN AND TAZOBACTAM SCH MLS/HR: 3; .375 INJECTION, POWDER, LYOPHILIZED, FOR SOLUTION INTRAVENOUS; PARENTERAL at 04:35

## 2017-01-22 NOTE — CARDIOLOGY FOLLOW-UP
Subjective


Subjective


Date of Service:


Jan 22, 2017.


Pt evaluation today including:  conversation w/ patient, physical exam, chart 

review, lab review, review of studies, review of inpatient medication list


Additional Details:


Chest pain this AM concerning for angina - max 8/10, SBP to 160s at that time


Received nitropaste, 2 mg morphine --> chest pain now largely resolved.  SBP 

120s





No other new complaints.





Problem List


Medical Problems:


(1) Cardiac ischemia


Status: Acute  





(2) Chest pain


Status: Acute  





(3) Dyspnea on exertion


Status: Acute  





(4) Renal insufficiency


Status: Acute  





(5) Sepsis


Status: Acute  





(6) Shortness of breath


Status: Acute  





(7) SOB (shortness of breath)


Status: Acute  





(8) Symptomatic anemia


Status: Acute  





(9) Upper GI bleeding


Status: Acute  





(10) Vertigo


Status: Acute  





(11) Weakness


Status: Acute  











Review of Systems


Constitutional:  + fatigue, No fever


Respiratory:  + see HPI, No cough, No shortness of breath


Cardiac:  + chest pain


Abdomen:  No GI bleeding, No pain


Male :  + urinary frequency, No sexual dysfunction


Neurologic:  No balance problems, No numbness/tingling, No paralysis, No 

weakness


Skin:  No problem reported





Objective


Vital Signs





Last Vital Signs Documentation








  Date Time  Temp Pulse Resp B/P Pulse Ox O2 Delivery O2 Flow Rate FiO2


 


1/22/17 09:26  94 20 167/74 93 Nasal Cannula 2.0 


 


1/22/17 09:13 36.6       











Physical Exam:


General Appearance:  no apparent distress


ENT:  hearing grossly normal


Neck:  supple


Respiratory/Chest:  chest non-tender, lungs clear, + decreased breath sounds (

minimally at bases), + pertinent finding (right side lung sound is more 

congested comparing to left side )


Cardiovascular:  regular rate, rhythm, no edema, + pertinent finding (well-

perfused)


Abdomen:  normal bowel sounds, non tender, soft


Extremities:  no pedal edema, + pertinent finding (left knee swelling, erythma )


Neurologic/Psychiatric:  no motor/sensory deficits


Skin:  warm/dry, + pertinent finding (left knee erythma)





Assessment and Plan


1. NSTEMI/H/o CAD s/p remote CABG


2. Acute on chronic renal insufficiency


3. Anemia, H/o GI bleed


4. ?Septic bursitis


5. DM


6. HTN


7. Prior history of symptomatic bradycardia





Recurrent chest pain this AM now resolved with nitro/morphine.


Remains hemodynamically/electrically stable, with no signs of heart failure and 

LV function preserved ---> in that setting, with acute on chronic renal 

insufficiency and worsening anemia - would hold off on cardiac cath at this 

time.


Continue medical management  - agree with plavix.  Add ASA.  Would hold off on 

restarting heparin for now.  Continue to trend hemoglobin, if < 8 would 

transfuse.


Continue nitropatch --> if recurrent symptoms would start nitro drip, target 

SBP <130


Continue Statin.


Trend trops





Please contact if were to develop uncontrollable chest pain.





Will follow.


Medications:





 Current Inpatient Medications








 Medications


  (Trade)  Dose


 Ordered  Sig/[place]


 Route  Start Time


 Stop Time Status Last Admin


Dose Admin


 


 Atorvastatin


 Calcium


  (Lipitor Tab)  40 mg  DAILY


 PO  1/21/17 09:00


 2/20/17 08:59  1/22/17 08:34


40 MG


 


 Cholecalciferol


  (Vitamin D Tab)  1,000


 inter.unit  DAILY


 PO  1/21/17 09:00


 2/20/17 08:59  1/22/17 08:34


1,000 INTER.UNIT


 


 Clopidogrel


 Bisulfate


  (plAVix TAB)  75 mg  DAILY


 PO  1/21/17 09:00


 2/20/17 08:59  1/22/17 08:34


75 MG


 


 Cyanocobalamin


  (Vitamin B-12


 Tab)  1,000 mcg  DAILY


 PO  1/21/17 09:00


 2/20/17 08:59  1/22/17 08:34


1,000 MCG


 


 Meclizine HCl 25


 mg  25 mg  Q6


 PO  1/20/17 18:00


 2/19/17 17:59  1/22/17 06:03


25 MG


 


 Pantoprazole


 Sodium/Syringe


  (Protonix Inj/


 Syringe)  10 ml @ 5


 mls/min  DAILY@11


 IV  1/21/17 11:00


 2/20/17 10:59  1/21/17 11:00


5 MLS/MIN


 


 Acetaminophen


  (Tylenol Tab)  650 mg  Q4H  PRN


 PO  1/20/17 15:15


 2/19/17 15:14  1/21/17 04:14


650 MG


 


 Zolpidem Tartrate


  (Ambien Tab)  5 mg  HSZ  PRN


 PO  1/20/17 15:15


 2/19/17 15:14   


 


 


 Nitroglycerin


  (Nitrostat Tab)  0.4 mg  UD  PRN


 SL  1/20/17 15:15


 2/19/17 15:14  1/22/17 08:32


0.4 MG


 


 Lorazepam


  (Ativan Inj)  0.5 mg  Q4H  PRN


 IV  1/20/17 15:15


 2/19/17 15:14   


 


 


 Magnesium


 Hydroxide


  (Milk Of


 Magnesia Susp)  30 ml  Q6H  PRN


 PO  1/20/17 15:15


 2/19/17 15:14   


 


 


 Bisacodyl


  (Dulcolax Supp)  10 mg  DAILY  PRN


 ME  1/20/17 15:15


 2/19/17 15:14   


 


 


 Diphenhydramine


 HCl


  (Benadryl Inj)  25 mg  Q4H  PRN


 IV  1/20/17 15:15


 2/19/17 15:14   


 


 


 Al Hydrox/Mg


 Hydrox/


 Simethicone 15 ml  15 ml  Q4H  PRN


 PO  1/20/17 15:15


 2/19/17 15:14   


 


 


 Promethazine HCl/


 Sodium Chloride


  (Phenergan Inj/


 Nss 50ml)  50.5 ml @ 


 202 mls/hr  Q4H  PRN


 IV  1/20/17 15:15


 2/19/17 15:14   


 


 


 Ondansetron HCl


  (Zofran Inj)  4 mg  Q6H  PRN


 IV  1/20/17 15:15


 2/19/17 15:14   


 


 


 Docusate Sodium


  (coLACE CAP)  100 mg  BID


 PO  1/20/17 21:00


 2/19/17 20:59  1/22/17 08:34


100 MG


 


 Morphine Sulfate


  (MoRPHine


 SULFATE INJ)  2 mg  Q2H  PRN


 IV  1/20/17 15:15


 2/3/17 15:14  1/22/17 09:25


2 MG


 


 Insulin Aspart


  (novoLOG ASPART)  **SLIDING


 SCALE**


 **If C...  ACHS


 SC  1/20/17 16:00


 2/19/17 15:59  1/22/17 07:00


8 UNITS


 


 Glucose


  (Glucose 40% Gel)    UD  PRN


 PO  1/20/17 15:15


 2/19/17 15:14   


 


 


 Glucose


  (Glucose Chew


 Tab)  1 tabs  UD  PRN


 PO  1/20/17 15:15


 2/19/17 15:14   


 


 


 Dextrose


  (Dextrose 50%


 50ML Syringe)  50 ml  UD  PRN


 IV  1/20/17 15:15


 2/19/17 15:14   


 


 


 Glucagon


  (Glucagon Inj)  1 mg  UD  PRN


 SQ  1/20/17 15:15


 2/19/17 15:14   


 


 


 Insulin Glargine


 20 unit  20 unit  QAM


 SQ  1/21/17 09:00


 2/20/17 08:59  1/22/17 08:40


20 UNIT


 


 Piperacillin Sod/


 Tazobactam Sod/


 Dextrose


  (Zosyn Iv/D5


 100ml)  115 ml @ 


 28.75 mls/


 hr  Q8H


 IV  1/20/17 20:00


 1/27/17 19:59  1/22/17 04:35


28.75 MLS/HR


 


 Guaifenesin


  (Organidin Nr


 Tab)  600 mg  BID


 PO  1/20/17 21:00


 2/19/17 20:59  1/22/17 08:34


600 MG


 


 Budesonide


  (Pulmicort


 Respules 0.5MG/


 2ML Neb Soln)  0.5 mg  BIDR


 INH  1/20/17 20:00


 2/19/17 19:59  1/22/17 07:19


0.5 MG


 


 Levofloxacin


  (Consult)  1 ea  UD  PRN


 N/A  1/20/17 16:15


 2/19/17 16:14   


 


 


 Piperacillin Sod/


 Tazobactam Sod


  (Consult)  1 ea  UD  PRN


 N/A  1/20/17 16:15


 2/19/17 16:14   


 


 


 Vancomycin HCl


  (Consult)  1 ea  UD  PRN


 N/A  1/20/17 16:15


 2/19/17 16:14   


 


 


 Bacitracin


  (Bacitracin Oint)  1 appln  BID


 EXT  1/21/17 21:00


 1/26/17 09:01  1/22/17 08:36


1 APPLN


 


 Ipratropium


 Bromide


  (Atrovent 0.02%


 0.5MG/2.5ML Neb)  0.5 mg  Q4R


 INH  1/21/17 16:00


 2/19/17 20:59  1/22/17 07:18


0.5 MG


 


 Levalbuterol


  (Xopenex 1.25MG/


 0.5ML Neb)  1.25 mg  Q4R


 INH  1/21/17 16:00


 2/19/17 20:59  1/22/17 07:18


1.25 MG


 


 Oxycodone HCl


  (Roxicodone


 Immediate Rel Tab)  5 mg  Q4  PRN


 PO  1/21/17 14:45


 2/4/17 14:44  1/21/17 15:28


5 MG


 


 Heparin Sodium


  (Porcine)


  (Heparin Sq 5000


 Unit/0.5ml)  5,000 unit  Q8


 SQ  1/21/17 22:00


 2/20/17 21:59  1/22/17 06:03


5,000 UNIT


 


 Nitroglycerin


  (Nitro-Dur 0.4


 Mg/Hr Patch)  1 patch  QAM


 TD  1/22/17 09:00


 2/21/17 08:59  1/22/17 08:34


1 PATCH


 


 Miscellaneous 1 ea  1 ea  DAILY@21


 N/A  1/21/17 21:00


 2/20/17 20:59   


 


 


 Sodium Chloride


  (Nss 1000ml)  1,000 ml @ 


 100 mls/hr  Q10H


 IV  1/22/17 09:15


 2/21/17 09:14  1/22/17 09:06


100 MLS/HR


 


 Magnesium Oxide


  (Mag-Ox Tab)  400 mg  QAM


 PO  1/23/17 09:00


 2/22/17 08:59 UNV  


 


 


 Heparin Sodium/


 Dextrose  1 ea  NOW  STAT


 N/A  1/22/17 09:35


 1/22/17 09:36 UNV  


 








Lab Results:


1/22/17 05:49








Red Blood Count 2.99, Mean Corpuscular Volume 89.6, Mean Corpuscular Hemoglobin 

29.4, Mean Corpuscular Hemoglobin Concent 32.8





1/22/17 05:49

















Test


  1/21/17


16:33 1/22/17


05:49 1/22/17


06:47 1/22/17


08:47


 


Lactic Acid Level


  2.6 mmol/L


(0.4-2.0) 


  


  


 


 


White Blood Count


  


  20.96 K/uL


(4.8-10.8) 


  


 


 


Red Blood Count


  


  2.99 M/uL


(4.7-6.1) 


  


 


 


Hemoglobin


  


  8.8 g/dL


(14.0-18.0) 


  


 


 


Hematocrit  26.8 % (42-52)   


 


Mean Corpuscular Volume


  


  89.6 fL


() 


  


 


 


Mean Corpuscular Hemoglobin


  


  29.4 pg


(25-34) 


  


 


 


Mean Corpuscular Hemoglobin


Concent 


  32.8 g/dl


(32-36) 


  


 


 


Platelet Count


  


  98 K/uL


(130-400) 


  


 


 


RDW Standard Deviation


  


  65.2 fL


(36.4-46.3) 


  


 


 


RDW Coefficient of Variation


  


  20.0 %


(11.5-14.5) 


  


 


 


Neutrophils % (Manual)  66.7 %   


 


Lymphocytes % (Manual)  3.5 %   


 


Monocytes % (Manual)  28.9 %   


 


Eosinophils % (Manual)  0.9 %   


 


Neutrophils # (Manual)


  


  13.98 K/uL


(1.4-6.5) 


  


 


 


Total Absolute Neutrophils


  


  13.98 K/uL


(1.4-6.5) 


  


 


 


Lymphocytes # (Manual)


  


  0.73 K/uL


(1.2-3.4) 


  


 


 


Total Absolute Lymphocytes


  


  0.73 K/uL


(1.2-3.4) 


  


 


 


Monocytes # (Manual)


  


  6.06 K/uL


(0.11-0.59) 


  


 


 


Eosinophils # (Manual)


  


  0.19 K/uL


(0-0.5) 


  


 


 


Platelet Estimate  DECREASED   


 


Anisocytosis  PRESENT   


 


Prothrombin Time


  


  13.3 SECONDS


(9.0-12.0) 


  


 


 


Prothromb Time International


Ratio 


  1.2 (0.9-1.1) 


  


  


 


 


Activated Partial


Thromboplast Time 


  30.3 SECONDS


(21.0-31.0) 


  


 


 


Partial Thromboplastin Ratio  1.2   


 


Anion Gap


  


  13.0 mmol/L


(3-11) 


  


 


 


Est Creatinine Clear Calc


Drug Dose 


  23.2 ml/min 


  


  


 


 


Estimated GFR (


American) 


  23.5 


  


  


 


 


Estimated GFR (Non-


American 


  20.2 


  


  


 


 


BUN/Creatinine Ratio  12.3 (10-20)   


 


Calcium Level


  


  7.9 mg/dl


(8.5-10.1) 


  


 


 


Magnesium Level


  


  1.7 mg/dl


(1.8-2.4) 


  


 


 


Random Vancomycin Level  15.0 mcg/ml   


 


Bedside Glucose


  


  


  271 mg/dl


(70-99) 


 


 


Troponin I


  


  


  


  0.260 ng/ml


(0-0.045)

## 2017-01-22 NOTE — PROGRESS NOTE
Progress Note


Postop day 1 bedside dorsal slit


Patient afebrile vital signs are stable


He says he is voiding much better now that the foreskin has been opened no 

longer has to strain in order has any pain when he voids


The incision is clean


Assessment-history of phimosis post dorsal slit


Patient doing well in terms of voiding


Continue with antibiotic ointment application to the incision twice a day


He should follow-up in our office in a couple weeks after discharge for wound 

check

## 2017-01-22 NOTE — CARDIOLOGY FOLLOW-UP
Subjective


Date of Service:


Jan 22, 2017.


Pt evaluation today including:  conversation w/ patient, physical exam, lab 

review, review of studies, review of inpatient medication list


History of Present Illness


This is an 81-year-old gentleman with a history of coronary artery disease 

including myocardial infarction in 1997 bypass surgery in 2008, stage IV kidney 

disease, diabetes, hyperlipidemia, sick sinus syndrome and a history of chest 

discomfort. His chest discomfort has been evaluated in the past and he has had 

elevated troponins in the past however with his kidney disease he has declined 

invasive evaluation although it has been offered. He has also had a GI bleed in 

May 2016.





He was hospitalized in September 2015 and at that time his troponin increased 

to a maximum of 2.4, in May 2016 his troponin was 0.068. He had an 

echocardiogram done 05/29/2016 which showed normal left ventricular size and 

function with concentric left ventricular hypertrophy. He presented now with 

symptoms of fatigue and was noted to have an elevated troponin as well as lung 

disease. He was not having chest discomfort, he does have chronic difficulty 

with exertion which is becoming very short of breath with climbing steps or 

even minimal exertion. He does not think that is been changing recently but has 

changed over the last several years. He did use a nitroglycerin tablet for 

chest discomfort on the evening of 01/19/2017, he evidently hasn't use one for 

some time before that. That did relieve his chest discomfort. He feels that he 

is suffering from a "cold" and is having little more difficulty with his 

breathing than usual.





Since admission he has had no chest pain until this morning. He developed chest 

discomfort this morning which is very typical of his angina, and 

electrocardiogram did show anterolateral ST depression but no ST elevation. He 

did receive nitroglycerin 3 but continues to have chest discomfort. His blood 

pressure is somewhat elevated, probably due to the anxiety associated with the 

discomfort.





Social History


Smoking Status:  Never Smoker


History of Alcohol Use:  No





Review of Systems


Respiratory:  + dyspnea on exertion, + see HPI, + wheezing, No cough, No 

shortness of breath


Cardiac:  + see HPI, No chest pain





Medications


Cardiovascular:











Item Value  Date Time


 


Nitroglycerin 1 patch 1/22/17 0900





 (Nitro-Dur 0.4 QAM/TD 1/22/17 0834





Mg/Hr Patch)  


 


Heparin Sodium 5,000 unit 1/21/17 2200





 (Porcine) Q8/SQ 1/22/17 0603





 (Heparin Sq 5000  





Unit/0.5ml)  


 


Atorvastatin 40 mg 1/21/17 0900





Calcium DAILY/PO 1/22/17 0834





 (Lipitor Tab)  


 


Clopidogrel 75 mg 1/21/17 0900





Bisulfate DAILY/PO 1/22/17 0834





 (plAVix TAB)  


 


Meclizine HCl 25 mg 1/20/17 1800





 (Antivert Tab) Q6/PO 1/22/17 0603


 


Nitroglycerin 0.4 mg 1/20/17 1515





 (Nitrostat Tab) UD  PRN/SL 1/22/17 0832











Objective





 Vital Signs Past 12 Hours








  Date Time  Temp Pulse Resp B/P Pulse Ox O2 Delivery O2 Flow Rate FiO2


 


1/22/17 08:02 36.7 63 18 144/74 93   


 


1/22/17 07:20  64 16  93 Room Air  


 


1/22/17 04:01 36.6 64 20 129/68 93 Room Air  


 


1/22/17 04:00      Room Air  


 


1/22/17 03:26  79 16  93 Room Air  


 


1/22/17 00:00      Room Air  


 


1/21/17 23:49 36.7 73 20 119/69 92 Room Air  


 


1/21/17 23:41  73 16  94 Room Air  








Last Recorded Weight-Kilograms:  88.600


Intake & Output











 8-Hour Column   


 


 1/21/17 1/22/17 1/22/17





 16:00 00:00 08:00


 


Intake Total 3212 ml 1005 ml 805 ml


 


Output Total  300 ml 


 


Balance 3212 ml 705 ml 805 ml














 24-Hour Column 


 


 1/22/17





 08:00


 


Intake Total 5022 ml


 


Output Total 300 ml


 


Balance 4722 ml








Physical Exam


Constitutional:  


   Level of Distress:  mild distress


Lungs:  


   Respiratory effort:  no dyspnea, good air movement


   Auscultation:  no rales/crackles, expiratory wheezing


Cardiovascular:  


   Heart Auscultation:  RRR, no murmurs, no rubs, no gallops


Peripheral Pulses:  


   Bruits:  none appreciated


Extremities:  no edema





Data


Laboratory Results:





Last 24 Hours








Test


  1/21/17


11:09 1/21/17


16:08 1/21/17


16:33 1/21/17


20:21


 


Bedside Glucose 266 mg/dl  162 mg/dl   126 mg/dl 


 


Lactic Acid Level   2.6 mmol/L  














Test


  1/22/17


05:49 1/22/17


06:47 1/22/17


08:47 


 


 


White Blood Count 20.96 K/uL    


 


Red Blood Count 2.99 M/uL    


 


Hemoglobin 8.8 g/dL    


 


Hematocrit 26.8 %    


 


Mean Corpuscular Volume 89.6 fL    


 


Mean Corpuscular Hemoglobin 29.4 pg    


 


Mean Corpuscular Hemoglobin


Concent 32.8 g/dl 


  


  


  


 


 


Platelet Count 98 K/uL    


 


RDW Standard Deviation 65.2 fL    


 


RDW Coefficient of Variation 20.0 %    


 


Neutrophils % (Manual) 66.7 %    


 


Lymphocytes % (Manual) 3.5 %    


 


Monocytes % (Manual) 28.9 %    


 


Eosinophils % (Manual) 0.9 %    


 


Neutrophils # (Manual) 13.98 K/uL    


 


Total Absolute Neutrophils 13.98 K/uL    


 


Lymphocytes # (Manual) 0.73 K/uL    


 


Total Absolute Lymphocytes 0.73 K/uL    


 


Monocytes # (Manual) 6.06 K/uL    


 


Eosinophils # (Manual) 0.19 K/uL    


 


Platelet Estimate DECREASED    


 


Anisocytosis PRESENT    


 


Prothrombin Time 13.3 SECONDS    


 


Prothromb Time International


Ratio 1.2 


  


  


  


 


 


Activated Partial


Thromboplast Time 30.3 SECONDS 


  


  


  


 


 


Partial Thromboplastin Ratio 1.2    


 


Sodium Level 140 mmol/L    


 


Potassium Level 4.4 mmol/L    


 


Chloride Level 107 mmol/L    


 


Carbon Dioxide Level 20 mmol/L    


 


Anion Gap 13.0 mmol/L    


 


Blood Urea Nitrogen 35 mg/dl    


 


Creatinine 2.80 mg/dl    


 


Est Creatinine Clear Calc


Drug Dose 23.2 ml/min 


  


  


  


 


 


Estimated GFR (


American) 23.5 


  


  


  


 


 


Estimated GFR (Non-


American 20.2 


  


  


  


 


 


BUN/Creatinine Ratio 12.3    


 


Random Glucose 262 mg/dl    


 


Calcium Level 7.9 mg/dl    


 


Magnesium Level 1.7 mg/dl    


 


Random Vancomycin Level 15.0 mcg/ml    


 


Bedside Glucose  271 mg/dl   








EKG: This morning with chest discomfort, sinus rhythm with 1 mm of 

anterolateral ST depression consistent with ischemia.





Telemetry reviewed:  Sinus rhythm, no significant abnormality.





Assessment and Plan


#1. Chest discomfort: It sounds as though he has been having more chest 

discomfort lately, he had it the night before admission and had elevated 

troponins in the descending pattern after admission, and now he has had chest 

discomfort this morning not relieved by 3 nitroglycerin. He has been reluctant 

to consider catheterization but this certainly seems like it is an ischemic 

event based on his electrocardiogram and his symptoms. I am going to give him 

morphine and put him back on heparin. I discussed catheterization with him and 

he is not refusing, he does want to talk to his family. If we can control his 

chest discomfort it may not be urgent.





#2. Elevated cardiac enzymes: His troponin was slightly elevated on admission 

but the pattern was descending, that could be from the chest discomfort he had 

the day before admission, it could be due to demand ischemia due to his current 

pulmonary condition. He does not have anything to suggest an acute myocardial 

infarction now (no diagnostic electrocardiographic changes) therefore I would 

not consider urgent invasive evaluation. 





#3. Coronary disease: He has coronary artery disease, he has significant 

difficulty with exertion which could be an anginal equivalent although he 

typically does not have chest discomfort with exertion. He did of chest 

discomfort 1/19/2017 without exertion.





#4. Chronic kidney disease: He has long-standing kidney disease and his 

creatinine is a little bit higher at 2.8 today. This may interfere with our 

ability to perform invasive evaluation but if his symptoms persist we may have 

no alternative.





Thank you for allowing me to participate in his care.

## 2017-01-22 NOTE — PROGRESS NOTE
DATE: 01/22/2017

 

PROCEDURE:   The patient's knee was prepped with alcohol.  We aspirated the

prepatellar bursa area.  I got a small amount of just bloody fluid. 

There were no signs of infection or abscess.  We got about a total of 5 mL of

bloody fluid.  There is still quite a bit of remaining blood present.  We did

not send this off for culture, as it was clearly just blood.  A compressive

bandage was applied.  The patient tolerated the procedure well with no

complications.

 

 

 

 

LUCAS

## 2017-01-22 NOTE — ORTHOPEDIC CONSULTATION
DATE OF CONSULTATION:  01/22/2017

 

CHIEF COMPLAINT:  Left knee swelling.

 

HISTORY OF PRESENT ILLNESS:  The patient is an 81-year-old gentleman with

multiple underlying medical issues recently admitted with generalized

weakness, dizziness, and confusion.  He apparently had some swelling in the

front of his knee for the past several weeks.  He was seen in Dr. Lucas's

office and had his prepatella bursa aspirated.  It showed mostly bloody fluid.  
He did

have cultures done at that time, which were negative.  He also was sent for

gout and there were no signs of gout or pseudogout.  It seemed to get a

little bit better than he did bump it again recently and it swelled a little

bit more.  He was admitted to the hospital 2 days ago.  He has been placed on

antibiotics.  He does say that he has gotten some better over the past day. 

No history of knee problems in the past.

 

PAST MEDICAL HISTORY:  Significant for,

1.  Chronic anemia.

2.  End-stage renal disease.

3.  Diabetes.

4.  History of bypass surgery.

5.  Thrombocytopenia.

 

PHYSICAL EXAMINATION:

VITAL SIGNS:  Temperature is 36.7.  Vital signs stable.

EXTREMITIES:  Physical examination of the left lower extremity reveals an

obvious swelling of his prepatellar bursa area of the left knee.  There is a

little bit of redness and bruising appearance to it.  It does not look

cellulitic per se, but just traumatized.  He can do a good straight leg

raise.  There is no significant knee effusion.  He can range his knee from 0

to 90 degrees without a lot of pain.  There is not much warmth to it.

 

LABORATORY DATA:  His white cell count is 20.96.  Hemoglobin 8.8.  Hematocrit

26.8.

 

Previous labs, his knee aspirate from January 9th revealed 3000 white cells

with 92% polys.  There were 3 million red cells.  His culture results are no

growth.

 

ASSESSMENT:  An 81-year-old male with multiple underlying medical problems,

admitted for confusion and fatigue with the left knee what appears to be a

hemorrhagic prepatellar bursitis.  There are no signs of fracture or knee

effusion or knee intraarticular problem.  On x-ray, it does appear that there

may be a hint of our suggestion of gout, but this was negative on his

prepatellar aspirate in early January 9th.  I do not think his knee is the

source of infection, but we will aspirate the prepatellar bursa and see if we

can get any fluid off and send it for analysis.

 

PLAN:  We will aspirate his prepatellar bursa and send it for analysis.  We

will put a compression dressing on it.  Continue medical management. 

Clinically, does not appear infected or source of infection.  Any questions

can be directed to me at [phone].

 

 

 

[facility]

## 2017-01-22 NOTE — HOSPITALIST PROGRESS NOTE
Hospitalist Progress Note


Date of Service


Jan 22, 2017.





Subjective


Pt evaluation today including:  conversation w/ patient, conversation w/ family

, physical exam, chart review, lab review, review of studies, review of 

inpatient medication list


overnight doing ok, but around 7am , has one episode of chest pain,





Objective


Vital Signs











  Date Time  Temp Pulse Resp B/P Pulse Ox O2 Delivery O2 Flow Rate FiO2


 


1/22/17 12:06      Nasal Cannula 2.0 


 


1/22/17 11:53 36.5 58 18 124/72 95  2.0 


 


1/22/17 11:09  66 16  96 Room Air  


 


1/22/17 09:26  94 20 167/74 93 Nasal Cannula 2.0 


 


1/22/17 09:13 36.6 107 23 171/75 93 Nasal Cannula 2.0 


 


1/22/17 08:05      Nasal Cannula 2.0 


 


1/22/17 08:02 36.7 63 18 144/74 93   


 


1/22/17 07:20  64 16  93 Room Air  


 


1/22/17 04:01 36.6 64 20 129/68 93 Room Air  


 


1/22/17 04:00      Room Air  


 


1/22/17 03:26  79 16  93 Room Air  


 


1/22/17 00:00      Room Air  


 


1/21/17 23:49 36.7 73 20 119/69 92 Room Air  


 


1/21/17 23:41  73 16  94 Room Air  


 


1/21/17 20:00      Room Air  


 


1/21/17 19:35  75 16  95 Nasal Cannula 2.0 


 


1/21/17 19:23 36.6 75 18 176/68 97 Nasal Cannula 2.0 


 


1/21/17 16:03      Nasal Cannula 2.0 


 


1/21/17 15:48 36.9 64 16 159/70 97 Nasal Cannula 2.0 


 


1/21/17 14:20  67 16  93 Room Air  











Physical Exam


General Appearance:  no apparent distress


Eyes:  normal inspection


ENT:  hearing grossly normal


Neck:  supple


Respiratory/Chest:  chest non-tender, normal breath sounds


Cardiovascular:  regular rate, rhythm


Abdomen:  normal bowel sounds, non tender


Neurologic/Psychiatric:  oriented x 3





Laboratory Results





Last 24 Hours








Test


  1/21/17


16:08 1/21/17


16:33 1/21/17


20:21 1/22/17


05:49


 


Bedside Glucose 162 mg/dl   126 mg/dl  


 


Lactic Acid Level  2.6 mmol/L   


 


White Blood Count    20.96 K/uL 


 


Red Blood Count    2.99 M/uL 


 


Hemoglobin    8.8 g/dL 


 


Hematocrit    26.8 % 


 


Mean Corpuscular Volume    89.6 fL 


 


Mean Corpuscular Hemoglobin    29.4 pg 


 


Mean Corpuscular Hemoglobin


Concent 


  


  


  32.8 g/dl 


 


 


Platelet Count    98 K/uL 


 


RDW Standard Deviation    65.2 fL 


 


RDW Coefficient of Variation    20.0 % 


 


Neutrophils % (Manual)    66.7 % 


 


Lymphocytes % (Manual)    3.5 % 


 


Monocytes % (Manual)    28.9 % 


 


Eosinophils % (Manual)    0.9 % 


 


Neutrophils # (Manual)    13.98 K/uL 


 


Total Absolute Neutrophils    13.98 K/uL 


 


Lymphocytes # (Manual)    0.73 K/uL 


 


Total Absolute Lymphocytes    0.73 K/uL 


 


Monocytes # (Manual)    6.06 K/uL 


 


Eosinophils # (Manual)    0.19 K/uL 


 


Platelet Estimate    DECREASED 


 


Anisocytosis    PRESENT 


 


Prothrombin Time    13.3 SECONDS 


 


Prothromb Time International


Ratio 


  


  


  1.2 


 


 


Activated Partial


Thromboplast Time 


  


  


  30.3 SECONDS 


 


 


Partial Thromboplastin Ratio    1.2 


 


Sodium Level    140 mmol/L 


 


Potassium Level    4.4 mmol/L 


 


Chloride Level    107 mmol/L 


 


Carbon Dioxide Level    20 mmol/L 


 


Anion Gap    13.0 mmol/L 


 


Blood Urea Nitrogen    35 mg/dl 


 


Creatinine    2.80 mg/dl 


 


Est Creatinine Clear Calc


Drug Dose 


  


  


  23.2 ml/min 


 


 


Estimated GFR (


American) 


  


  


  23.5 


 


 


Estimated GFR (Non-


American 


  


  


  20.2 


 


 


BUN/Creatinine Ratio    12.3 


 


Random Glucose    262 mg/dl 


 


Calcium Level    7.9 mg/dl 


 


Magnesium Level    1.7 mg/dl 


 


Random Vancomycin Level    15.0 mcg/ml 














Test


  1/22/17


06:47 1/22/17


08:47 1/22/17


11:14 


 


 


Bedside Glucose 271 mg/dl   219 mg/dl  


 


Troponin I  0.260 ng/ml   











Assessment and Plan


Patient is an 80-year-old male with PMH of atherosclerotic coronary artery 

disease, diabetes mellitus, CKD stage IV, HTN, dyslipidemia, monoclonal 

gammopathy with chronic anemia and thrombocytopenia presented to ER due to 

weakness, SOB and cough 





1 chest pain: patient had one episode of chest pain, resolved with pain 

medication and nitro, no significant EKG change, troponin was negative, cardio 

saw the patient, didn't consider ACS. monitor 





2 SIRS - improving, unknow etiology, according ortho, unlikely left knee 

infection. nasal MRSA is negative, D/C vancomycin now.  continue IV zosyn.  f/u 

lactic acid.  f/u blood culture, sputum culture. 





3 Diabetes mellitus--continue Lantus insulin 20 units subcutaneous every morning

, and place on Accu-Cheks before meals and at bedtime with NovoLog coverage.  





4 Left knee prepatellar bursitis- orthopedics did aspiration, doesn't look like 

infection. f/u culture results.  





5 Acute renal insufficiency: renal is on board, hold vancomycin now due to 

renal toxicity and unclear infection source. monitor renal function





DVT prophylaxis: heparin 


Code: full


Diet: diabetic diet

## 2017-01-22 NOTE — NEPHROLOGY PROGRESS NOTE
Nephrology Progress Note


Date of Service


Jan 22, 2017.





Chief Complaint


Chronic renal insufficiency





Subjective


Mr. Hummel was resting comfortably on my assessment this morning.  He did have 

reported chest pain with dynamic EKG changes reported.  I discussed the plan of 

care with Dr. Sharma.  Opted to continue medical management at this time with 

close monitoring.  





Mr. Hummel denies significant shortness of breath at rest.  No fevers or chills.

  Appetite appropriate.  IV NS infusion restarted this morning.





Mr. Baron denied significant pain or discomfort with regards to the dorsal 

slit procedure performed yesterday at the bedside for phimosis.  He denies 

voiding difficulty.





Review of Systems


A complete review of systems was performed.  Pertinent positives are noted 

above. All other systems are negative.





Vital Signs





Last 8 Hrs








  Date Time  Temp Pulse Resp B/P Pulse Ox O2 Delivery O2 Flow Rate FiO2


 


1/22/17 09:26  94 20 167/74 93 Nasal Cannula 2.0 


 


1/22/17 09:13 36.6 107 23 171/75 93 Nasal Cannula 2.0 


 


1/22/17 08:05      Nasal Cannula 2.0 


 


1/22/17 08:02 36.7 63 18 144/74 93   


 


1/22/17 07:20  64 16  93 Room Air  


 


1/22/17 04:01 36.6 64 20 129/68 93 Room Air  


 


1/22/17 04:00      Room Air  


 


1/22/17 03:26  79 16  93 Room Air  











I & O











 24-Hour Column 


 


 1/22/17





 08:00


 


Intake Total 5022 ml


 


Output Total 300 ml


 


Balance 4722 ml











Last Recorded Weight


Weight (Kilograms):  88.600





Physical Exam


General Appearance:  no apparent distress, + thin


Head:  normocephalic, atraumatic


Eyes:  normal inspection, sclerae normal


ENT:  normal ENT inspection, pharynx normal


Neck:  supple, no JVD


Respiratory/Chest:  + decreased breath sounds, + wheezing


Cardiovascular:  regular rate, rhythm, no gallop


Abdomen/GI:  non tender, soft


Extremities/Musculoskelatal:  normal inspection, + pedal edema (trace)


Neurologic/Psych:  alert, normal mood/affect





Family History





Heart disease





Social History


Smokeless Tobacco Use:  No


Alcohol Use:  none


Drug Use:  none


Marital Status:  


Housing Status:  lives with family


Occupation:  retired





Laboratory Results


Past 24 Hours


1/22/17 05:49








Red Blood Count 2.99, Mean Corpuscular Volume 89.6, Mean Corpuscular Hemoglobin 

29.4, Mean Corpuscular Hemoglobin Concent 32.8





1/22/17 05:49

















Test


  1/21/17


11:09 1/21/17


16:08 1/21/17


16:33 1/21/17


20:21


 


Bedside Glucose


  266 mg/dl


(70-99) 162 mg/dl


(70-99) 


  126 mg/dl


(70-99)


 


Lactic Acid Level


  


  


  2.6 mmol/L


(0.4-2.0) 


 














Test


  1/22/17


05:49 1/22/17


06:47 1/22/17


08:47 


 


 


White Blood Count


  20.96 K/uL


(4.8-10.8) 


  


  


 


 


Red Blood Count


  2.99 M/uL


(4.7-6.1) 


  


  


 


 


Hemoglobin


  8.8 g/dL


(14.0-18.0) 


  


  


 


 


Hematocrit 26.8 % (42-52)    


 


Mean Corpuscular Volume


  89.6 fL


() 


  


  


 


 


Mean Corpuscular Hemoglobin


  29.4 pg


(25-34) 


  


  


 


 


Mean Corpuscular Hemoglobin


Concent 32.8 g/dl


(32-36) 


  


  


 


 


Platelet Count


  98 K/uL


(130-400) 


  


  


 


 


RDW Standard Deviation


  65.2 fL


(36.4-46.3) 


  


  


 


 


RDW Coefficient of Variation


  20.0 %


(11.5-14.5) 


  


  


 


 


Neutrophils % (Manual) 66.7 %    


 


Lymphocytes % (Manual) 3.5 %    


 


Monocytes % (Manual) 28.9 %    


 


Eosinophils % (Manual) 0.9 %    


 


Neutrophils # (Manual)


  13.98 K/uL


(1.4-6.5) 


  


  


 


 


Total Absolute Neutrophils


  13.98 K/uL


(1.4-6.5) 


  


  


 


 


Lymphocytes # (Manual)


  0.73 K/uL


(1.2-3.4) 


  


  


 


 


Total Absolute Lymphocytes


  0.73 K/uL


(1.2-3.4) 


  


  


 


 


Monocytes # (Manual)


  6.06 K/uL


(0.11-0.59) 


  


  


 


 


Eosinophils # (Manual)


  0.19 K/uL


(0-0.5) 


  


  


 


 


Platelet Estimate DECREASED    


 


Anisocytosis PRESENT    


 


Prothrombin Time


  13.3 SECONDS


(9.0-12.0) 


  


  


 


 


Prothromb Time International


Ratio 1.2 (0.9-1.1) 


  


  


  


 


 


Activated Partial


Thromboplast Time 30.3 SECONDS


(21.0-31.0) 


  


  


 


 


Partial Thromboplastin Ratio 1.2    


 


Anion Gap


  13.0 mmol/L


(3-11) 


  


  


 


 


Est Creatinine Clear Calc


Drug Dose 23.2 ml/min 


  


  


  


 


 


Estimated GFR (


American) 23.5 


  


  


  


 


 


Estimated GFR (Non-


American 20.2 


  


  


  


 


 


BUN/Creatinine Ratio 12.3 (10-20)    


 


Calcium Level


  7.9 mg/dl


(8.5-10.1) 


  


  


 


 


Magnesium Level


  1.7 mg/dl


(1.8-2.4) 


  


  


 


 


Random Vancomycin Level 15.0 mcg/ml    


 


Bedside Glucose


  


  271 mg/dl


(70-99) 


  


 


 


Troponin I


  


  


  0.260 ng/ml


(0-0.045) 


 











Allergies


Coded Allergies:  


     No Known Allergies (Verified , 1/20/17)





Medications





 Current Inpatient Medications








 Medications


  (Trade)  Dose


 Ordered  Sig/[place]


 Route  Start Time


 Stop Time Status Last Admin


Dose Admin


 


 Atorvastatin


 Calcium


  (Lipitor Tab)  40 mg  DAILY


 PO  1/21/17 09:00


 2/20/17 08:59  1/22/17 08:34


40 MG


 


 Cholecalciferol


  (Vitamin D Tab)  1,000


 inter.unit  DAILY


 PO  1/21/17 09:00


 2/20/17 08:59  1/22/17 08:34


1,000 INTER.UNIT


 


 Clopidogrel


 Bisulfate


  (plAVix TAB)  75 mg  DAILY


 PO  1/21/17 09:00


 2/20/17 08:59  1/22/17 08:34


75 MG


 


 Cyanocobalamin


  (Vitamin B-12


 Tab)  1,000 mcg  DAILY


 PO  1/21/17 09:00


 2/20/17 08:59  1/22/17 08:34


1,000 MCG


 


 Meclizine HCl 25


 mg  25 mg  Q6


 PO  1/20/17 18:00


 2/19/17 17:59  1/22/17 06:03


25 MG


 


 Pantoprazole


 Sodium/Syringe


  (Protonix Inj/


 Syringe)  10 ml @ 5


 mls/min  DAILY@11


 IV  1/21/17 11:00


 2/20/17 10:59  1/22/17 10:45


5 MLS/MIN


 


 Acetaminophen


  (Tylenol Tab)  650 mg  Q4H  PRN


 PO  1/20/17 15:15


 2/19/17 15:14  1/21/17 04:14


650 MG


 


 Zolpidem Tartrate


  (Ambien Tab)  5 mg  HSZ  PRN


 PO  1/20/17 15:15


 2/19/17 15:14   


 


 


 Nitroglycerin


  (Nitrostat Tab)  0.4 mg  UD  PRN


 SL  1/20/17 15:15


 2/19/17 15:14  1/22/17 08:32


0.4 MG


 


 Lorazepam


  (Ativan Inj)  0.5 mg  Q4H  PRN


 IV  1/20/17 15:15


 2/19/17 15:14   


 


 


 Magnesium


 Hydroxide


  (Milk Of


 Magnesia Susp)  30 ml  Q6H  PRN


 PO  1/20/17 15:15


 2/19/17 15:14   


 


 


 Bisacodyl


  (Dulcolax Supp)  10 mg  DAILY  PRN


 MA  1/20/17 15:15


 2/19/17 15:14   


 


 


 Diphenhydramine


 HCl


  (Benadryl Inj)  25 mg  Q4H  PRN


 IV  1/20/17 15:15


 2/19/17 15:14   


 


 


 Al Hydrox/Mg


 Hydrox/


 Simethicone 15 ml  15 ml  Q4H  PRN


 PO  1/20/17 15:15


 2/19/17 15:14   


 


 


 Promethazine HCl/


 Sodium Chloride


  (Phenergan Inj/


 Nss 50ml)  50.5 ml @ 


 202 mls/hr  Q4H  PRN


 IV  1/20/17 15:15


 2/19/17 15:14   


 


 


 Ondansetron HCl


  (Zofran Inj)  4 mg  Q6H  PRN


 IV  1/20/17 15:15


 2/19/17 15:14   


 


 


 Docusate Sodium


  (coLACE CAP)  100 mg  BID


 PO  1/20/17 21:00


 2/19/17 20:59  1/22/17 08:34


100 MG


 


 Morphine Sulfate


  (MoRPHine


 SULFATE INJ)  2 mg  Q2H  PRN


 IV  1/20/17 15:15


 2/3/17 15:14  1/22/17 09:25


2 MG


 


 Insulin Aspart


  (novoLOG ASPART)  **SLIDING


 SCALE**


 **If C...  ACHS


 SC  1/20/17 16:00


 2/19/17 15:59  1/22/17 07:00


8 UNITS


 


 Glucose


  (Glucose 40% Gel)    UD  PRN


 PO  1/20/17 15:15


 2/19/17 15:14   


 


 


 Glucose


  (Glucose Chew


 Tab)  1 tabs  UD  PRN


 PO  1/20/17 15:15


 2/19/17 15:14   


 


 


 Dextrose


  (Dextrose 50%


 50ML Syringe)  50 ml  UD  PRN


 IV  1/20/17 15:15


 2/19/17 15:14   


 


 


 Glucagon


  (Glucagon Inj)  1 mg  UD  PRN


 SQ  1/20/17 15:15


 2/19/17 15:14   


 


 


 Insulin Glargine


 20 unit  20 unit  QAM


 SQ  1/21/17 09:00


 2/20/17 08:59  1/22/17 08:40


20 UNIT


 


 Piperacillin Sod/


 Tazobactam Sod/


 Dextrose


  (Zosyn Iv/D5


 100ml)  115 ml @ 


 28.75 mls/


 hr  Q8H


 IV  1/20/17 20:00


 1/27/17 19:59  1/22/17 04:35


28.75 MLS/HR


 


 Guaifenesin


  (Organidin Nr


 Tab)  600 mg  BID


 PO  1/20/17 21:00


 2/19/17 20:59  1/22/17 08:34


600 MG


 


 Budesonide


  (Pulmicort


 Respules 0.5MG/


 2ML Neb Soln)  0.5 mg  BIDR


 INH  1/20/17 20:00


 2/19/17 19:59  1/22/17 07:19


0.5 MG


 


 Levofloxacin


  (Consult)  1 ea  UD  PRN


 N/A  1/20/17 16:15


 2/19/17 16:14   


 


 


 Piperacillin Sod/


 Tazobactam Sod


  (Consult)  1 ea  UD  PRN


 N/A  1/20/17 16:15


 2/19/17 16:14   


 


 


 Vancomycin HCl


  (Consult)  1 ea  UD  PRN


 N/A  1/20/17 16:15


 2/19/17 16:14   


 


 


 Bacitracin


  (Bacitracin Oint)  1 appln  BID


 EXT  1/21/17 21:00


 1/26/17 09:01  1/22/17 08:36


1 APPLN


 


 Ipratropium


 Bromide


  (Atrovent 0.02%


 0.5MG/2.5ML Neb)  0.5 mg  Q4R


 INH  1/21/17 16:00


 2/19/17 20:59  1/22/17 07:18


0.5 MG


 


 Levalbuterol


  (Xopenex 1.25MG/


 0.5ML Neb)  1.25 mg  Q4R


 INH  1/21/17 16:00


 2/19/17 20:59  1/22/17 07:18


1.25 MG


 


 Oxycodone HCl


  (Roxicodone


 Immediate Rel Tab)  5 mg  Q4  PRN


 PO  1/21/17 14:45


 2/4/17 14:44  1/21/17 15:28


5 MG


 


 Nitroglycerin


  (Nitro-Dur 0.4


 Mg/Hr Patch)  1 patch  QAM


 TD  1/22/17 09:00


 2/21/17 08:59  1/22/17 08:34


1 PATCH


 


 Miscellaneous 1 ea  1 ea  DAILY@21


 N/A  1/21/17 21:00


 2/20/17 20:59   


 


 


 Sodium Chloride


  (Nss 1000ml)  1,000 ml @ 


 100 mls/hr  Q10H


 IV  1/22/17 09:15


 2/21/17 09:14  1/22/17 09:06


100 MLS/HR


 


 Magnesium Oxide


  (Mag-Ox Tab)  400 mg  QAM


 PO  1/23/17 09:00


 2/22/17 08:59   


 


 


 Aspirin 81 mg  81 mg  QAM


 PO  1/22/17 11:00


 2/21/17 10:59   


 


 


 Heparin Sodium/


 Dextrose


  (Heparin 25,000


 Unit/500ml D5W)  500 ml @ 


 29 mls/hr  [id number] PRN


 IV  1/22/17 10:30


 2/21/17 10:29   


 











Impression





(1) CKD (chronic kidney disease), stage IV


(2) Acute kidney injury superimposed on chronic kidney disease


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


(4) ACS (acute coronary syndrome)


Mr. Chase Hummel is an 81-year-old male with atherosclerotic coronary artery 

disease, diabetes mellitus, hypertension, dyslipidemia, monoclonal gammopathy 

with chronic anemia and thrombocytopenia and chronic kidney disease IV.   

Baseline creatinine ~2.0-2.2 mg/dL.  He has stated that he would not consider 

hemodialysis during prior discussions.  He has an extensive history of 

cardiovascular disease.  He presented to the ED with hypotension, hypoglycemia, 

generalized weakness, shortness of breath.  He has been struggling recently 

with right knee prepatellar bursitis.  He was admitted to rule-out ACS with 

hyperglycemia and some evidence of acute on chronic renal insufficiency in 

addition to possible sepsis.  





Creatinine worsening in past 24 hours.  Net positive fluid balance since 

admission.





Recommendations


Acute renal insufficiency:


-- Volume status appears appropriate.  Avoid aggressive IVF replacement


-- Hold ACEi


-- Suggest holding vancomycin unless clear source of infection





Shortness of breath:


-- Clinically consistent with a viral respiratory syndrome


-- Agree with continued use of bronchodilators





Cardiovascular disease with angina:


-- Discussed with cardiology this AM


-- Continue medical management





Monoclonal gammopathy:


-- Progressive anemia and thrombocytopenia noted


-- Given evidence of cardiac ischemia suggest PRBC transfusion support





Lactic acidosis:


-- Continue to trend for improvement





Bursitis:


-- Knee does not appear infected on exam but if limiting mobility may certainly 

need to be aspirated





Chronic kidney disease stage IV A2 in the setting of vascular disease, diabetes 

mellitus and hypertension. 


--Mr. Hummel has expressed multiple times in the past that he would not consider 

dialysis under any circumstances.  





BPH with chronic lower urinary tract symptoms and phimosis:


-- Dorsal slit procedure performed yesterday at the bedside

## 2017-01-23 VITALS
SYSTOLIC BLOOD PRESSURE: 152 MMHG | TEMPERATURE: 97.7 F | HEART RATE: 57 BPM | DIASTOLIC BLOOD PRESSURE: 71 MMHG | OXYGEN SATURATION: 94 %

## 2017-01-23 VITALS — HEART RATE: 74 BPM | OXYGEN SATURATION: 97 %

## 2017-01-23 VITALS
SYSTOLIC BLOOD PRESSURE: 166 MMHG | HEART RATE: 60 BPM | DIASTOLIC BLOOD PRESSURE: 73 MMHG | TEMPERATURE: 97.88 F | OXYGEN SATURATION: 97 %

## 2017-01-23 VITALS
TEMPERATURE: 97.88 F | SYSTOLIC BLOOD PRESSURE: 122 MMHG | HEART RATE: 69 BPM | DIASTOLIC BLOOD PRESSURE: 69 MMHG | OXYGEN SATURATION: 97 %

## 2017-01-23 VITALS
OXYGEN SATURATION: 93 % | DIASTOLIC BLOOD PRESSURE: 71 MMHG | SYSTOLIC BLOOD PRESSURE: 159 MMHG | HEART RATE: 64 BPM | TEMPERATURE: 97.88 F

## 2017-01-23 VITALS — HEART RATE: 96 BPM | OXYGEN SATURATION: 94 %

## 2017-01-23 VITALS
DIASTOLIC BLOOD PRESSURE: 81 MMHG | OXYGEN SATURATION: 97 % | TEMPERATURE: 97.88 F | HEART RATE: 65 BPM | SYSTOLIC BLOOD PRESSURE: 170 MMHG

## 2017-01-23 VITALS
OXYGEN SATURATION: 93 % | DIASTOLIC BLOOD PRESSURE: 61 MMHG | HEART RATE: 58 BPM | TEMPERATURE: 97.52 F | SYSTOLIC BLOOD PRESSURE: 132 MMHG

## 2017-01-23 VITALS — HEART RATE: 62 BPM | OXYGEN SATURATION: 97 %

## 2017-01-23 VITALS — HEART RATE: 60 BPM | OXYGEN SATURATION: 96 %

## 2017-01-23 VITALS — HEART RATE: 83 BPM | OXYGEN SATURATION: 94 %

## 2017-01-23 LAB
ANION GAP SERPL CALC-SCNC: 13 MMOL/L (ref 3–11)
BASOPHILS # BLD: 0.02 K/UL (ref 0–0.2)
BASOPHILS NFR BLD: 0.2 %
BUN SERPL-MCNC: 29 MG/DL (ref 7–18)
BUN/CREAT SERPL: 11.8 (ref 10–20)
CALCIUM SERPL-MCNC: 8.1 MG/DL (ref 8.5–10.1)
CHLORIDE SERPL-SCNC: 109 MMOL/L (ref 98–107)
CO2 SERPL-SCNC: 20 MMOL/L (ref 21–32)
COMPLETE: YES
CREAT CL PREDICTED SERPL C-G-VRATE: 26 ML/MIN
CREAT SERPL-MCNC: 2.5 MG/DL (ref 0.6–1.4)
EOSINOPHIL NFR BLD AUTO: 89 K/UL (ref 130–400)
GLUCOSE SERPL-MCNC: 210 MG/DL (ref 70–99)
HCT VFR BLD CALC: 25.7 % (ref 42–52)
IG%: 1.5 %
IMM GRANULOCYTES NFR BLD AUTO: 21.6 %
INR PPP: 1.1 (ref 0.9–1.1)
LYMPHOCYTES # BLD: 2.33 K/UL (ref 1.2–3.4)
MAGNESIUM SERPL-MCNC: 1.7 MG/DL (ref 1.8–2.4)
MCH RBC QN AUTO: 28.9 PG (ref 25–34)
MCHC RBC AUTO-ENTMCNC: 32.3 G/DL (ref 32–36)
MCV RBC AUTO: 89.5 FL (ref 80–100)
MONOCYTES NFR BLD: 24.5 %
NEUTROPHILS # BLD AUTO: 0.2 %
NEUTROPHILS NFR BLD AUTO: 52 %
PARTIAL THROMBOPLASTIN RATIO: 2.6
PARTIAL THROMBOPLASTIN RATIO: 2.7
PLATELET # BLD EST: (no result) 10*3/UL
PMV BLD AUTO: 11.9 FL (ref 7.4–10.4)
POTASSIUM SERPL-SCNC: 4.1 MMOL/L (ref 3.5–5.1)
PROTHROMBIN TIME: 12.3 SECONDS (ref 9–12)
RBC # BLD AUTO: 2.87 M/UL (ref 4.7–6.1)
ROULEAUX BLD QL SMEAR: (no result)
SODIUM SERPL-SCNC: 142 MMOL/L (ref 136–145)
WBC # BLD AUTO: 10.79 K/UL (ref 4.8–10.8)

## 2017-01-23 RX ADMIN — INSULIN ASPART SCH UNITS: 100 INJECTION, SOLUTION INTRAVENOUS; SUBCUTANEOUS at 08:39

## 2017-01-23 RX ADMIN — INSULIN ASPART SCH UNITS: 100 INJECTION, SOLUTION INTRAVENOUS; SUBCUTANEOUS at 12:04

## 2017-01-23 RX ADMIN — IPRATROPIUM BROMIDE SCH MG: 0.5 SOLUTION RESPIRATORY (INHALATION) at 07:36

## 2017-01-23 RX ADMIN — GUAIFENESIN SCH MG: 200 TABLET ORAL at 08:43

## 2017-01-23 RX ADMIN — DOCUSATE SODIUM SCH MG: 100 CAPSULE, LIQUID FILLED ORAL at 20:00

## 2017-01-23 RX ADMIN — PIPERACILLIN AND TAZOBACTAM SCH MLS/HR: 3; .375 INJECTION, POWDER, LYOPHILIZED, FOR SOLUTION INTRAVENOUS; PARENTERAL at 12:05

## 2017-01-23 RX ADMIN — BACITRACIN SCH APPLN: 500 OINTMENT TOPICAL at 08:46

## 2017-01-23 RX ADMIN — IPRATROPIUM BROMIDE SCH MG: 0.5 SOLUTION RESPIRATORY (INHALATION) at 19:33

## 2017-01-23 RX ADMIN — GUAIFENESIN SCH MG: 200 TABLET ORAL at 20:00

## 2017-01-23 RX ADMIN — MECLIZINE HYDROCHLORIDE SCH MG: 25 TABLET ORAL at 18:09

## 2017-01-23 RX ADMIN — LEVALBUTEROL SCH MG: 1.25 SOLUTION, CONCENTRATE RESPIRATORY (INHALATION) at 11:35

## 2017-01-23 RX ADMIN — Medication SCH MG: at 08:45

## 2017-01-23 RX ADMIN — NITROGLYCERIN SCH PATCH: 80 PATCH TRANSDERMAL at 08:45

## 2017-01-23 RX ADMIN — PIPERACILLIN AND TAZOBACTAM SCH MLS/HR: 3; .375 INJECTION, POWDER, LYOPHILIZED, FOR SOLUTION INTRAVENOUS; PARENTERAL at 20:01

## 2017-01-23 RX ADMIN — LEVALBUTEROL SCH MG: 1.25 SOLUTION, CONCENTRATE RESPIRATORY (INHALATION) at 23:49

## 2017-01-23 RX ADMIN — Medication SCH MG: at 08:43

## 2017-01-23 RX ADMIN — BUDESONIDE SCH MG: 0.5 SUSPENSION RESPIRATORY (INHALATION) at 07:36

## 2017-01-23 RX ADMIN — Medication SCH INTER.UNIT: at 08:44

## 2017-01-23 RX ADMIN — LEVALBUTEROL SCH MG: 1.25 SOLUTION, CONCENTRATE RESPIRATORY (INHALATION) at 07:36

## 2017-01-23 RX ADMIN — ACETAMINOPHEN PRN MG: 325 TABLET ORAL at 14:04

## 2017-01-23 RX ADMIN — MECLIZINE HYDROCHLORIDE SCH MG: 25 TABLET ORAL at 12:04

## 2017-01-23 RX ADMIN — ACETAMINOPHEN PRN MG: 325 TABLET ORAL at 21:35

## 2017-01-23 RX ADMIN — LEVALBUTEROL SCH MG: 1.25 SOLUTION, CONCENTRATE RESPIRATORY (INHALATION) at 19:33

## 2017-01-23 RX ADMIN — IPRATROPIUM BROMIDE SCH MG: 0.5 SOLUTION RESPIRATORY (INHALATION) at 03:28

## 2017-01-23 RX ADMIN — MECLIZINE HYDROCHLORIDE SCH MG: 25 TABLET ORAL at 05:24

## 2017-01-23 RX ADMIN — BACITRACIN SCH APPLN: 500 OINTMENT TOPICAL at 20:00

## 2017-01-23 RX ADMIN — LEVALBUTEROL SCH MG: 1.25 SOLUTION, CONCENTRATE RESPIRATORY (INHALATION) at 03:28

## 2017-01-23 RX ADMIN — INSULIN ASPART SCH UNITS: 100 INJECTION, SOLUTION INTRAVENOUS; SUBCUTANEOUS at 18:08

## 2017-01-23 RX ADMIN — LEVALBUTEROL SCH MG: 1.25 SOLUTION, CONCENTRATE RESPIRATORY (INHALATION) at 15:22

## 2017-01-23 RX ADMIN — MECLIZINE HYDROCHLORIDE SCH MG: 25 TABLET ORAL at 00:00

## 2017-01-23 RX ADMIN — BUDESONIDE SCH MG: 0.5 SUSPENSION RESPIRATORY (INHALATION) at 19:34

## 2017-01-23 RX ADMIN — IPRATROPIUM BROMIDE SCH MG: 0.5 SOLUTION RESPIRATORY (INHALATION) at 15:22

## 2017-01-23 RX ADMIN — INSULIN ASPART SCH UNITS: 100 INJECTION, SOLUTION INTRAVENOUS; SUBCUTANEOUS at 21:32

## 2017-01-23 RX ADMIN — IPRATROPIUM BROMIDE SCH MG: 0.5 SOLUTION RESPIRATORY (INHALATION) at 11:35

## 2017-01-23 RX ADMIN — DOCUSATE SODIUM SCH MG: 100 CAPSULE, LIQUID FILLED ORAL at 08:43

## 2017-01-23 RX ADMIN — Medication SCH MCG: at 08:44

## 2017-01-23 RX ADMIN — PIPERACILLIN AND TAZOBACTAM SCH MLS/HR: 3; .375 INJECTION, POWDER, LYOPHILIZED, FOR SOLUTION INTRAVENOUS; PARENTERAL at 04:18

## 2017-01-23 RX ADMIN — CLOPIDOGREL BISULFATE SCH MG: 75 TABLET, FILM COATED ORAL at 08:43

## 2017-01-23 RX ADMIN — INSULIN GLARGINE SCH UNIT: 100 INJECTION, SOLUTION SUBCUTANEOUS at 08:40

## 2017-01-23 RX ADMIN — IPRATROPIUM BROMIDE SCH MG: 0.5 SOLUTION RESPIRATORY (INHALATION) at 23:49

## 2017-01-23 RX ADMIN — PANTOPRAZOLE SODIUM SCH MLS/MIN: 40 INJECTION, POWDER, FOR SOLUTION INTRAVENOUS at 12:01

## 2017-01-23 RX ADMIN — ATORVASTATIN CALCIUM SCH MG: 40 TABLET, FILM COATED ORAL at 08:44

## 2017-01-23 NOTE — NEPHROLOGY PROGRESS NOTE
Nephrology Progress Note


Date of Service


Jan 23, 2017.





Chief Complaint


Follow up evaluation of acute on chronic kidney injury





Subjective


Mr. Hummel was seen & examined in the PCU this morning.  He was admitted with a 

NSTEMI.  Cardiology has recommended medical management.  Patient currently 

denies angina.





The patient was admitted w/ left prepatellar bursitis.  He is on broad spectrum 

antibiotics.  He reports that his pain and swelling have improved.  He had 

follow up join aspiration performed yesterday.





Mr. Baron has phimosis.  He required a dorsal slit procedure this 

hospitalization.  He currently denies voiding difficulty.





Review of Systems


Constitutional:  No fever


Cardiovascular:  No chest pain


Respiratory:  No dyspnea at rest


Abdomen:  No nausea, No pain


Genitourinary - Male:  No dysuria, No urinary hesitancy


Extremities:  No leg edema


A complete review of systems was performed.  Pertinent positives are noted 

above. All other systems are negative.





Vital Signs





Last 8 Hrs








  Date Time  Temp Pulse Resp B/P Pulse Ox O2 Delivery O2 Flow Rate FiO2


 


1/23/17 07:53 36.6 65 18 170/81 97   


 


1/23/17 07:28  74 20  97 Nasal Cannula 2.0 


 


1/23/17 04:36 36.6 69 18 122/69 97 Nasal Cannula 2.0 


 


1/23/17 04:00      Nasal Cannula 2.0 


 


1/23/17 03:29  62 18  97 Nasal Cannula 2.0 











I & O











 24-Hour Column 


 


 1/23/17





 08:00


 


Intake Total 1620 ml


 


Balance 1620 ml











Last Recorded Weight


Weight (Kilograms):  88.600





Physical Exam


General Appearance:  no apparent distress


Head:  atraumatic


Eyes:  PERRL, EOMI


Neck:  no adenopathy


Respiratory/Chest:  lungs clear, no respiratory distress


Cardiovascular:  regular rate, rhythm


Abdomen/GI:  normal bowel sounds, non tender, soft


Extremities/Musculoskelatal:  no calf tenderness, no pedal edema, + pertinent 

finding (left knee is currently wrapped)


Neurologic/Psych:  alert, oriented x 3





Family History





Heart disease





Social History


Smokeless Tobacco Use:  No


Alcohol Use:  none


Drug Use:  none


Marital Status:  


Housing Status:  lives with family


Occupation:  retired





Laboratory Results


Past 24 Hours


1/23/17 06:35








Red Blood Count 2.87, Mean Corpuscular Volume 89.5, Mean Corpuscular Hemoglobin 

28.9, Mean Corpuscular Hemoglobin Concent 32.3, Mean Platelet Volume 11.9, 

Neutrophils (%) (Auto) 52.0, Lymphocytes (%) (Auto) 21.6, Monocytes (%) (Auto) 

24.5, Eosinophils (%) (Auto) 0.2, Basophils (%) (Auto) 0.2, Neutrophils # (Auto

) 5.62, Lymphocytes # (Auto) 2.33, Monocytes # (Auto) 2.64, Eosinophils # (Auto

) 0.02, Basophils # (Auto) 0.02





1/23/17 06:35

















Test


  1/22/17


11:14 1/22/17


16:05 1/22/17


17:39 1/22/17


20:19


 


Bedside Glucose


  219 mg/dl


(70-99) 202 mg/dl


(70-99) 


  125 mg/dl


(70-99)


 


Activated Partial


Thromboplast Time 


  


  102.7 SECONDS


(21.0-31.0) 


 


 


Partial Thromboplastin Ratio   4.0  


 


Lactic Acid Level


  


  


  2.0 mmol/L


(0.4-2.0) 


 














Test


  1/23/17


01:24 1/23/17


06:28 1/23/17


06:35 


 


 


Activated Partial


Thromboplast Time 69.5 SECONDS


(21.0-31.0) 


  68.6 SECONDS


(21.0-31.0) 


 


 


Partial Thromboplastin Ratio 2.7   2.6  


 


Bedside Glucose


  


  216 mg/dl


(70-99) 


  


 


 


White Blood Count


  


  


  10.79 K/uL


(4.8-10.8) 


 


 


Red Blood Count


  


  


  2.87 M/uL


(4.7-6.1) 


 


 


Hemoglobin


  


  


  8.3 g/dL


(14.0-18.0) 


 


 


Hematocrit   25.7 % (42-52)  


 


Mean Corpuscular Volume


  


  


  89.5 fL


() 


 


 


Mean Corpuscular Hemoglobin


  


  


  28.9 pg


(25-34) 


 


 


Mean Corpuscular Hemoglobin


Concent 


  


  32.3 g/dl


(32-36) 


 


 


Platelet Count


  


  


  89 K/uL


(130-400) 


 


 


Mean Platelet Volume


  


  


  11.9 fL


(7.4-10.4) 


 


 


Neutrophils (%) (Auto)   52.0 %  


 


Lymphocytes (%) (Auto)   21.6 %  


 


Monocytes (%) (Auto)   24.5 %  


 


Eosinophils (%) (Auto)   0.2 %  


 


Basophils (%) (Auto)   0.2 %  


 


Neutrophils # (Auto)


  


  


  5.62 K/uL


(1.4-6.5) 


 


 


Lymphocytes # (Auto)


  


  


  2.33 K/uL


(1.2-3.4) 


 


 


Monocytes # (Auto)


  


  


  2.64 K/uL


(0.11-0.59) 


 


 


Eosinophils # (Auto)


  


  


  0.02 K/uL


(0-0.5) 


 


 


Basophils # (Auto)


  


  


  0.02 K/uL


(0-0.2) 


 


 


RDW Standard Deviation


  


  


  65.2 fL


(36.4-46.3) 


 


 


RDW Coefficient of Variation


  


  


  19.9 %


(11.5-14.5) 


 


 


Immature Granulocyte % (Auto)   1.5 %  


 


Immature Granulocyte # (Auto)


  


  


  0.16 K/uL


(0.00-0.02) 


 


 


Hypogranular Neutrophils   2+  


 


Platelet Estimate   DECREASED  


 


R[place]leau   1+  


 


Prothrombin Time


  


  


  12.3 SECONDS


(9.0-12.0) 


 


 


Prothromb Time International


Ratio 


  


  1.1 (0.9-1.1) 


  


 


 


Anion Gap


  


  


  13.0 mmol/L


(3-11) 


 


 


Est Creatinine Clear Calc


Drug Dose 


  


  26.0 ml/min 


  


 


 


Estimated GFR (


American) 


  


  26.9 


  


 


 


Estimated GFR (Non-


American 


  


  23.2 


  


 


 


BUN/Creatinine Ratio   11.8 (10-20)  


 


Calcium Level


  


  


  8.1 mg/dl


(8.5-10.1) 


 


 


Magnesium Level


  


  


  1.7 mg/dl


(1.8-2.4) 


 











Allergies


Coded Allergies:  


     No Known Allergies (Verified , 1/20/17)





Medications





 Current Inpatient Medications








 Medications


  (Trade)  Dose


 Ordered  Sig/[facility]


 Route  Start Time


 Stop Time Status Last Admin


Dose Admin


 


 Atorvastatin


 Calcium


  (Lipitor Tab)  40 mg  DAILY


 PO  1/21/17 09:00


 2/20/17 08:59  1/23/17 08:44


40 MG


 


 Cholecalciferol


  (Vitamin D Tab)  1,000


 inter.unit  DAILY


 PO  1/21/17 09:00


 2/20/17 08:59  1/23/17 08:44


1,000 INTER.UNIT


 


 Clopidogrel


 Bisulfate


  (plAVix TAB)  75 mg  DAILY


 PO  1/21/17 09:00


 2/20/17 08:59  1/23/17 08:43


75 MG


 


 Cyanocobalamin


  (Vitamin B-12


 Tab)  1,000 mcg  DAILY


 PO  1/21/17 09:00


 2/20/17 08:59  1/23/17 08:44


1,000 MCG


 


 Meclizine HCl 25


 mg  25 mg  Q6


 PO  1/20/17 18:00


 2/19/17 17:59  1/22/17 17:32


25 MG


 


 Pantoprazole


 Sodium/Syringe


  (Protonix Inj/


 Syringe)  10 ml @ 5


 mls/min  DAILY@11


 IV  1/21/17 11:00


 2/20/17 10:59  1/22/17 10:45


5 MLS/MIN


 


 Acetaminophen


  (Tylenol Tab)  650 mg  Q4H  PRN


 PO  1/20/17 15:15


 2/19/17 15:14  1/21/17 04:14


650 MG


 


 Zolpidem Tartrate


  (Ambien Tab)  5 mg  HSZ  PRN


 PO  1/20/17 15:15


 2/19/17 15:14   


 


 


 Nitroglycerin


  (Nitrostat Tab)  0.4 mg  UD  PRN


 SL  1/20/17 15:15


 2/19/17 15:14  1/22/17 08:32


0.4 MG


 


 Lorazepam


  (Ativan Inj)  0.5 mg  Q4H  PRN


 IV  1/20/17 15:15


 2/19/17 15:14   


 


 


 Magnesium


 Hydroxide


  (Milk Of


 Magnesia Susp)  30 ml  Q6H  PRN


 PO  1/20/17 15:15


 2/19/17 15:14   


 


 


 Bisacodyl


  (Dulcolax Supp)  10 mg  DAILY  PRN


 MD  1/20/17 15:15


 2/19/17 15:14   


 


 


 Diphenhydramine


 HCl


  (Benadryl Inj)  25 mg  Q4H  PRN


 IV  1/20/17 15:15


 2/19/17 15:14   


 


 


 Al Hydrox/Mg


 Hydrox/


 Simethicone 15 ml  15 ml  Q4H  PRN


 PO  1/20/17 15:15


 2/19/17 15:14   


 


 


 Promethazine HCl/


 Sodium Chloride


  (Phenergan Inj/


 Nss 50ml)  50.5 ml @ 


 202 mls/hr  Q4H  PRN


 IV  1/20/17 15:15


 2/19/17 15:14   


 


 


 Ondansetron HCl


  (Zofran Inj)  4 mg  Q6H  PRN


 IV  1/20/17 15:15


 2/19/17 15:14   


 


 


 Docusate Sodium


  (coLACE CAP)  100 mg  BID


 PO  1/20/17 21:00


 2/19/17 20:59  1/23/17 08:43


100 MG


 


 Morphine Sulfate


  (MoRPHine


 SULFATE INJ)  2 mg  Q2H  PRN


 IV  1/20/17 15:15


 2/3/17 15:14  1/22/17 09:25


2 MG


 


 Insulin Aspart


  (novoLOG ASPART)  **SLIDING


 SCALE**


 **If C...  ACHS


 SC  1/20/17 16:00


 2/19/17 15:59  1/23/17 08:39


7 UNITS


 


 Glucose


  (Glucose 40% Gel)    UD  PRN


 PO  1/20/17 15:15


 2/19/17 15:14   


 


 


 Glucose


  (Glucose Chew


 Tab)  1 tabs  UD  PRN


 PO  1/20/17 15:15


 2/19/17 15:14   


 


 


 Dextrose


  (Dextrose 50%


 50ML Syringe)  50 ml  UD  PRN


 IV  1/20/17 15:15


 2/19/17 15:14   


 


 


 Glucagon


  (Glucagon Inj)  1 mg  UD  PRN


 SQ  1/20/17 15:15


 2/19/17 15:14   


 


 


 Insulin Glargine


 20 unit  20 unit  QAM


 SQ  1/21/17 09:00


 2/20/17 08:59  1/23/17 08:40


20 UNIT


 


 Piperacillin Sod/


 Tazobactam Sod/


 Dextrose


  (Zosyn Iv/D5


 100ml)  115 ml @ 


 28.75 mls/


 hr  Q8H


 IV  1/20/17 20:00


 1/27/17 19:59  1/23/17 04:18


28.75 MLS/HR


 


 Guaifenesin


  (Organidin Nr


 Tab)  600 mg  BID


 PO  1/20/17 21:00


 2/19/17 20:59  1/23/17 08:43


600 MG


 


 Budesonide


  (Pulmicort


 Respules 0.5MG/


 2ML Neb Soln)  0.5 mg  BIDR


 INH  1/20/17 20:00


 2/19/17 19:59  1/23/17 07:36


0.5 MG


 


 Piperacillin Sod/


 Tazobactam Sod


  (Consult)  1 ea  UD  PRN


 N/A  1/20/17 16:15


 2/19/17 16:14   


 


 


 Bacitracin


  (Bacitracin Oint)  1 appln  BID


 EXT  1/21/17 21:00


 1/26/17 09:01  1/23/17 08:46


1 APPLN


 


 Ipratropium


 Bromide


  (Atrovent 0.02%


 0.5MG/2.5ML Neb)  0.5 mg  Q4R


 INH  1/21/17 16:00


 2/19/17 20:59  1/23/17 07:36


0.5 MG


 


 Levalbuterol


  (Xopenex 1.25MG/


 0.5ML Neb)  1.25 mg  Q4R


 INH  1/21/17 16:00


 2/19/17 20:59  1/23/17 07:36


1.25 MG


 


 Oxycodone HCl


  (Roxicodone


 Immediate Rel Tab)  5 mg  Q4  PRN


 PO  1/21/17 14:45


 2/4/17 14:44  1/21/17 15:28


5 MG


 


 Nitroglycerin


  (Nitro-Dur 0.4


 Mg/Hr Patch)  1 patch  QAM


 TD  1/22/17 09:00


 2/21/17 08:59  1/23/17 08:45


1 PATCH


 


 Miscellaneous


  (Remove


 Nitro-Dur Patch)  1 ea  DAILY@21


 N/A  1/21/17 21:00


 2/20/17 20:59   


 


 


 Magnesium Oxide


  (Mag-Ox Tab)  400 mg  QAM


 PO  1/23/17 09:00


 2/22/17 08:59  1/23/17 08:45


400 MG


 


 Aspirin 81 mg  81 mg  QAM


 PO  1/22/17 11:00


 2/21/17 10:59  1/23/17 08:43


81 MG


 


 Heparin Sodium/


 Dextrose


  (Heparin 25,000


 Unit/500ml D5W)  500 ml @ 


 26 mls/hr  [id number] PRN


 IV  1/22/17 10:30


 2/21/17 10:29  1/22/17 11:23


29 MLS/HR


 


 Miscellaneous


 Information


  (Nursing Heparin


 Iv Rate Change)  1 ea  ONE  ONCE


 N/A  1/23/17 09:15


 1/23/17 09:16 UNV  


 











Impression





(1) CKD (chronic kidney disease), stage IV


(2) Acute kidney injury superimposed on chronic kidney disease


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


(4) ACS (acute coronary syndrome)


Mr. Chase Hummel is an 81-year-old male with atherosclerotic coronary artery 

disease, diabetes mellitus, hypertension, dyslipidemia, monoclonal gammopathy 

with chronic anemia and thrombocytopenia and chronic kidney disease IV.   

Baseline creatinine ~2.0 - 2.2 mg/dL.  He has stated that he would not consider 

hemodialysis during prior discussions.  He has an extensive history of 

cardiovascular disease.  He presented to the ED with hypotension, hypoglycemia, 

generalized weakness, shortness of breath.  He has been struggling recently 

with right knee prepatellar bursitis.  He was admitted to rule-out ACS with 

hyperglycemia and some evidence of acute on chronic renal insufficiency in 

addition to possible sepsis.





Recommendations


ACUTE KIDNEY INJURY:


-- Improving.  Baseline creatinine has been 2.0 - 2.2.  Will ask RN staff to 

record all UO


-- Volume status and electrolyte balance remain acceptable at this time


-- Monitor PRP





CHRONIC KIDNEY DISEASE:


-- Baseline creatinine 2.0 - 2.2


-- Mr. Hummel has expressed multiple times in the past that he would not 

consider dialysis under any circumstances.  





ASCVD:


-- CPK normal.  Troponin is trending down


-- Medical management as per cardiology





ANEMIA:


-- H/o MGUS and ASCVD


-- Recommend blood transfusion to maintain Hgb > or = 8.0





ID:


-- L prepatellar bursitis


-- Await joint aspirate results.  Patient is on empiric IV Zosyn 





BPH & PHIMOSIS:


-- Dorsal slit procedure performed 01/22/17 by urology

## 2017-01-23 NOTE — CARDIOLOGY FOLLOW-UP
Subjective


Subjective


Date of Service:


Jan 23, 2017.


Pt evaluation today including:  conversation w/ patient, physical exam, chart 

review, lab review, review of studies, review of inpatient medication list


Additional Details:


Minimal chest pain overnight.  No discomfort this AM.  No shortness of breath.





No other new complaints.





Tele reviewed - sinus gerard, otherwise no events.





Problem List


Medical Problems:


(1) Cardiac ischemia


Status: Acute  





(2) Chest pain


Status: Acute  





(3) Dyspnea on exertion


Status: Acute  





(4) Renal insufficiency


Status: Acute  





(5) Sepsis


Status: Acute  





(6) Shortness of breath


Status: Acute  





(7) SOB (shortness of breath)


Status: Acute  





(8) Symptomatic anemia


Status: Acute  





(9) Upper GI bleeding


Status: Acute  





(10) Vertigo


Status: Acute  





(11) Weakness


Status: Acute  











Review of Systems


Constitutional:  + fatigue, No fever


Respiratory:  + see HPI, No cough, No shortness of breath


Cardiac:  No chest pain


Abdomen:  No GI bleeding, No pain


Male :  + urinary frequency, No sexual dysfunction


Neurologic:  No balance problems, No numbness/tingling, No paralysis, No 

weakness


Skin:  No problem reported





Objective


Vital Signs





Last Vital Signs Documentation








  Date Time  Temp Pulse Resp B/P Pulse Ox O2 Delivery O2 Flow Rate FiO2


 


1/23/17 11:32  60 20  96 Nasal Cannula 2.0 


 


1/23/17 07:53 36.6   170/81    











Physical Exam:


General Appearance:  no apparent distress


ENT:  hearing grossly normal


Neck:  supple


Respiratory/Chest:  chest non-tender, lungs clear, + pertinent finding (coarse 

breath sounds with few scattered wheezing)


Cardiovascular:  regular rate, rhythm


Abdomen:  normal bowel sounds, non tender


Extremities:  no pedal edema


Neurologic/Psychiatric:  oriented x 3


Skin:  warm/dry, + pertinent finding





Assessment and Plan


1. NSTEMI/H/o CAD s/p remote CABG


2. Acute on chronic renal insufficiency


3. Anemia, H/o GI bleed


4. ?Septic bursitis


5. DM


6. HTN


7. Prior history of symptomatic bradycardia





Remains largely chest pain free. 


Continued medical management for NSTEMI


Can d/c heparin.  


Continue plavix, statin, ASA.  No Beta-blocker with prior symptomatic sinus 

bradycardia


Continue to trend hemoglobin, if < 8 would transfuse


Can transition nitropatch--> to long acting nitrate, suggest Imdur 30 mg daily


Medications:





 Current Inpatient Medications








 Medications


  (Trade)  Dose


 Ordered  Sig/[place]


 Route  Start Time


 Stop Time Status Last Admin


Dose Admin


 


 Atorvastatin


 Calcium


  (Lipitor Tab)  40 mg  DAILY


 PO  1/21/17 09:00


 2/20/17 08:59  1/23/17 08:44


40 MG


 


 Cholecalciferol


  (Vitamin D Tab)  1,000


 inter.unit  DAILY


 PO  1/21/17 09:00


 2/20/17 08:59  1/23/17 08:44


1,000 INTER.UNIT


 


 Clopidogrel


 Bisulfate


  (plAVix TAB)  75 mg  DAILY


 PO  1/21/17 09:00


 2/20/17 08:59  1/23/17 08:43


75 MG


 


 Cyanocobalamin


  (Vitamin B-12


 Tab)  1,000 mcg  DAILY


 PO  1/21/17 09:00


 2/20/17 08:59  1/23/17 08:44


1,000 MCG


 


 Meclizine HCl 25


 mg  25 mg  Q6


 PO  1/20/17 18:00


 2/19/17 17:59  1/23/17 12:04


25 MG


 


 Pantoprazole


 Sodium/Syringe


  (Protonix Inj/


 Syringe)  10 ml @ 5


 mls/min  DAILY@11


 IV  1/21/17 11:00


 2/20/17 10:59  1/23/17 12:01


5 MLS/MIN


 


 Acetaminophen


  (Tylenol Tab)  650 mg  Q4H  PRN


 PO  1/20/17 15:15


 2/19/17 15:14  1/21/17 04:14


650 MG


 


 Zolpidem Tartrate


  (Ambien Tab)  5 mg  HSZ  PRN


 PO  1/20/17 15:15


 2/19/17 15:14   


 


 


 Nitroglycerin


  (Nitrostat Tab)  0.4 mg  UD  PRN


 SL  1/20/17 15:15


 2/19/17 15:14  1/22/17 08:32


0.4 MG


 


 Lorazepam


  (Ativan Inj)  0.5 mg  Q4H  PRN


 IV  1/20/17 15:15


 2/19/17 15:14   


 


 


 Magnesium


 Hydroxide


  (Milk Of


 Magnesia Susp)  30 ml  Q6H  PRN


 PO  1/20/17 15:15


 2/19/17 15:14   


 


 


 Bisacodyl


  (Dulcolax Supp)  10 mg  DAILY  PRN


 NH  1/20/17 15:15


 2/19/17 15:14   


 


 


 Diphenhydramine


 HCl


  (Benadryl Inj)  25 mg  Q4H  PRN


 IV  1/20/17 15:15


 2/19/17 15:14   


 


 


 Al Hydrox/Mg


 Hydrox/


 Simethicone 15 ml  15 ml  Q4H  PRN


 PO  1/20/17 15:15


 2/19/17 15:14   


 


 


 Promethazine HCl/


 Sodium Chloride


  (Phenergan Inj/


 Nss 50ml)  50.5 ml @ 


 202 mls/hr  Q4H  PRN


 IV  1/20/17 15:15


 2/19/17 15:14   


 


 


 Ondansetron HCl


  (Zofran Inj)  4 mg  Q6H  PRN


 IV  1/20/17 15:15


 2/19/17 15:14   


 


 


 Docusate Sodium


  (coLACE CAP)  100 mg  BID


 PO  1/20/17 21:00


 2/19/17 20:59  1/23/17 08:43


100 MG


 


 Morphine Sulfate


  (MoRPHine


 SULFATE INJ)  2 mg  Q2H  PRN


 IV  1/20/17 15:15


 2/3/17 15:14  1/22/17 09:25


2 MG


 


 Insulin Aspart


  (novoLOG ASPART)  **SLIDING


 SCALE**


 **If C...  ACHS


 SC  1/20/17 16:00


 2/19/17 15:59  1/23/17 12:04


9 UNITS


 


 Glucose


  (Glucose 40% Gel)    UD  PRN


 PO  1/20/17 15:15


 2/19/17 15:14   


 


 


 Glucose


  (Glucose Chew


 Tab)  1 tabs  UD  PRN


 PO  1/20/17 15:15


 2/19/17 15:14   


 


 


 Dextrose


  (Dextrose 50%


 50ML Syringe)  50 ml  UD  PRN


 IV  1/20/17 15:15


 2/19/17 15:14   


 


 


 Glucagon


  (Glucagon Inj)  1 mg  UD  PRN


 SQ  1/20/17 15:15


 2/19/17 15:14   


 


 


 Insulin Glargine


 20 unit  20 unit  QAM


 SQ  1/21/17 09:00


 2/20/17 08:59  1/23/17 08:40


20 UNIT


 


 Piperacillin Sod/


 Tazobactam Sod/


 Dextrose


  (Zosyn Iv/D5


 100ml)  115 ml @ 


 28.75 mls/


 hr  Q8H


 IV  1/20/17 20:00


 1/27/17 19:59  1/23/17 12:05


28.75 MLS/HR


 


 Guaifenesin


  (Organidin Nr


 Tab)  600 mg  BID


 PO  1/20/17 21:00


 2/19/17 20:59  1/23/17 08:43


600 MG


 


 Budesonide


  (Pulmicort


 Respules 0.5MG/


 2ML Neb Soln)  0.5 mg  BIDR


 INH  1/20/17 20:00


 2/19/17 19:59  1/23/17 07:36


0.5 MG


 


 Piperacillin Sod/


 Tazobactam Sod


  (Consult)  1 ea  UD  PRN


 N/A  1/20/17 16:15


 2/19/17 16:14   


 


 


 Bacitracin


  (Bacitracin Oint)  1 appln  BID


 EXT  1/21/17 21:00


 1/26/17 09:01  1/23/17 08:46


1 APPLN


 


 Ipratropium


 Bromide


  (Atrovent 0.02%


 0.5MG/2.5ML Neb)  0.5 mg  Q4R


 INH  1/21/17 16:00


 2/19/17 20:59  1/23/17 11:35


0.5 MG


 


 Levalbuterol


  (Xopenex 1.25MG/


 0.5ML Neb)  1.25 mg  Q4R


 INH  1/21/17 16:00


 2/19/17 20:59  1/23/17 11:35


1.25 MG


 


 Oxycodone HCl


  (Roxicodone


 Immediate Rel Tab)  5 mg  Q4  PRN


 PO  1/21/17 14:45


 2/4/17 14:44  1/21/17 15:28


5 MG


 


 Nitroglycerin


  (Nitro-Dur 0.4


 Mg/Hr Patch)  1 patch  QAM


 TD  1/22/17 09:00


 2/21/17 08:59  1/23/17 08:45


1 PATCH


 


 Miscellaneous


  (Remove


 Nitro-Dur Patch)  1 ea  DAILY@21


 N/A  1/21/17 21:00


 2/20/17 20:59   


 


 


 Magnesium Oxide


  (Mag-Ox Tab)  400 mg  QAM


 PO  1/23/17 09:00


 2/22/17 08:59  1/23/17 08:45


400 MG


 


 Aspirin 81 mg  81 mg  QAM


 PO  1/22/17 11:00


 2/21/17 10:59  1/23/17 08:43


81 MG


 


 Heparin Sodium/


 Dextrose


  (Heparin 25,000


 Unit/500ml D5W)  500 ml @ 


 26 mls/hr  [id number] PRN


 IV  1/22/17 10:30


 2/21/17 10:29  1/22/17 11:23


29 MLS/HR








Lab Results:


1/23/17 06:35








Red Blood Count 2.87, Mean Corpuscular Volume 89.5, Mean Corpuscular Hemoglobin 

28.9, Mean Corpuscular Hemoglobin Concent 32.3, Mean Platelet Volume 11.9, 

Neutrophils (%) (Auto) 52.0, Lymphocytes (%) (Auto) 21.6, Monocytes (%) (Auto) 

24.5, Eosinophils (%) (Auto) 0.2, Basophils (%) (Auto) 0.2, Neutrophils # (Auto

) 5.62, Lymphocytes # (Auto) 2.33, Monocytes # (Auto) 2.64, Eosinophils # (Auto

) 0.02, Basophils # (Auto) 0.02





1/23/17 06:35

















Test


  1/22/17


17:39 1/23/17


06:35 1/23/17


11:15


 


Lactic Acid Level


  2.0 mmol/L


(0.4-2.0) 


  


 


 


White Blood Count


  


  10.79 K/uL


(4.8-10.8) 


 


 


Red Blood Count


  


  2.87 M/uL


(4.7-6.1) 


 


 


Hemoglobin


  


  8.3 g/dL


(14.0-18.0) 


 


 


Hematocrit  25.7 % (42-52)  


 


Mean Corpuscular Volume


  


  89.5 fL


() 


 


 


Mean Corpuscular Hemoglobin


  


  28.9 pg


(25-34) 


 


 


Mean Corpuscular Hemoglobin


Concent 


  32.3 g/dl


(32-36) 


 


 


Platelet Count


  


  89 K/uL


(130-400) 


 


 


Mean Platelet Volume


  


  11.9 fL


(7.4-10.4) 


 


 


Neutrophils (%) (Auto)  52.0 %  


 


Lymphocytes (%) (Auto)  21.6 %  


 


Monocytes (%) (Auto)  24.5 %  


 


Eosinophils (%) (Auto)  0.2 %  


 


Basophils (%) (Auto)  0.2 %  


 


Neutrophils # (Auto)


  


  5.62 K/uL


(1.4-6.5) 


 


 


Lymphocytes # (Auto)


  


  2.33 K/uL


(1.2-3.4) 


 


 


Monocytes # (Auto)


  


  2.64 K/uL


(0.11-0.59) 


 


 


Eosinophils # (Auto)


  


  0.02 K/uL


(0-0.5) 


 


 


Basophils # (Auto)


  


  0.02 K/uL


(0-0.2) 


 


 


RDW Standard Deviation


  


  65.2 fL


(36.4-46.3) 


 


 


RDW Coefficient of Variation


  


  19.9 %


(11.5-14.5) 


 


 


Immature Granulocyte % (Auto)  1.5 %  


 


Immature Granulocyte # (Auto)


  


  0.16 K/uL


(0.00-0.02) 


 


 


Hypogranular Neutrophils  2+  


 


Platelet Estimate  DECREASED  


 


R[place]au  1+  


 


Prothrombin Time


  


  12.3 SECONDS


(9.0-12.0) 


 


 


Prothromb Time International


Ratio 


  1.1 (0.9-1.1) 


  


 


 


Activated Partial


Thromboplast Time 


  68.6 SECONDS


(21.0-31.0) 


 


 


Partial Thromboplastin Ratio  2.6  


 


Anion Gap


  


  13.0 mmol/L


(3-11) 


 


 


Est Creatinine Clear Calc


Drug Dose 


  26.0 ml/min 


  


 


 


Estimated GFR (


American) 


  26.9 


  


 


 


Estimated GFR (Non-


American 


  23.2 


  


 


 


BUN/Creatinine Ratio  11.8 (10-20)  


 


Calcium Level


  


  8.1 mg/dl


(8.5-10.1) 


 


 


Magnesium Level


  


  1.7 mg/dl


(1.8-2.4) 


 


 


Bedside Glucose


  


  


  269 mg/dl


(70-99)

## 2017-01-23 NOTE — HOSPITALIST PROGRESS NOTE
Hospitalist Progress Note


Date of Service


Jan 23, 2017.





Subjective


Pt evaluation today including:  conversation w/ patient, physical exam, chart 

review, lab review, review of studies, review of inpatient medication list


The patient reports doing well without new complaint.  He wants out of the 

hospital as soon as possible.





   Additional Comments:


A 10 system review was performed and all were negative.  Positives were placed 

in the subjective section.





Objective


Vital Signs











  Date Time  Temp Pulse Resp B/P Pulse Ox O2 Delivery O2 Flow Rate FiO2


 


1/23/17 16:00      Nasal Cannula 2.0 


 


1/23/17 15:48 36.4 58 20 132/61 93 Nasal Cannula 2.0 


 


1/23/17 15:24  96 20  94 Nasal Cannula 2.0 


 


1/23/17 12:42 36.5 57 18 152/71 94 Nasal Cannula 2.0 


 


1/23/17 12:00      Nasal Cannula 2.0 


 


1/23/17 11:32  60 20  96 Nasal Cannula 2.0 


 


1/23/17 08:00      Nasal Cannula 2.0 


 


1/23/17 07:53 36.6 65 18 170/81 97   


 


1/23/17 07:28  74 20  97 Nasal Cannula 2.0 


 


1/23/17 04:36 36.6 69 18 122/69 97 Nasal Cannula 2.0 


 


1/23/17 04:00      Nasal Cannula 2.0 


 


1/23/17 03:29  62 18  97 Nasal Cannula 2.0 


 


1/23/17 00:00      Nasal Cannula 2.0 


 


1/22/17 23:42 36.6 65 18 157/78 96 Room Air  


 


1/22/17 23:20  73 18  97 Nasal Cannula 2.0 


 


1/22/17 20:11  80 16  89 Room Air  


 


1/22/17 20:00      Nasal Cannula 2.0 


 


1/22/17 19:49 36.7 71 16 143/70 94 Room Air  











Physical Exam


Notes:


GEN: Awake, alert, and oriented x 3. Not in acute distress


HEENT: Tm's intact, no inflammation, EOMI, PERRLA, MMM


Neck: Soft, supple


Lungs: CTA b/l, + prolonged expiratory phase. scattered rhonchi. 


Heart: REG, nrl S1S2 without murmurs, rubs or gallops


Abdomen: Soft, NT, ND, + BS


EXT: No C/C/E


NEURO: CN's II-XII grossly intact, non-focal


Skin: warm, dry, no rashes


PSYCH: pleasant, cooperative, no signs of significant anxiety or depression.





Laboratory Results





Last 24 Hours








Test


  1/22/17


17:39 1/22/17


20:19 1/23/17


01:24 1/23/17


06:28


 


Activated Partial


Thromboplast Time 102.7 SECONDS 


  


  69.5 SECONDS 


  


 


 


Partial Thromboplastin Ratio 4.0   2.7  


 


Lactic Acid Level 2.0 mmol/L    


 


Bedside Glucose  125 mg/dl   216 mg/dl 














Test


  1/23/17


06:35 1/23/17


11:15 1/23/17


16:03 1/23/17


16:04


 


White Blood Count 10.79 K/uL    


 


Red Blood Count 2.87 M/uL    


 


Hemoglobin 8.3 g/dL    


 


Hematocrit 25.7 %    


 


Mean Corpuscular Volume 89.5 fL    


 


Mean Corpuscular Hemoglobin 28.9 pg    


 


Mean Corpuscular Hemoglobin


Concent 32.3 g/dl 


  


  


  


 


 


Platelet Count 89 K/uL    


 


Mean Platelet Volume 11.9 fL    


 


Neutrophils (%) (Auto) 52.0 %    


 


Lymphocytes (%) (Auto) 21.6 %    


 


Monocytes (%) (Auto) 24.5 %    


 


Eosinophils (%) (Auto) 0.2 %    


 


Basophils (%) (Auto) 0.2 %    


 


Neutrophils # (Auto) 5.62 K/uL    


 


Lymphocytes # (Auto) 2.33 K/uL    


 


Monocytes # (Auto) 2.64 K/uL    


 


Eosinophils # (Auto) 0.02 K/uL    


 


Basophils # (Auto) 0.02 K/uL    


 


RDW Standard Deviation 65.2 fL    


 


RDW Coefficient of Variation 19.9 %    


 


Immature Granulocyte % (Auto) 1.5 %    


 


Immature Granulocyte # (Auto) 0.16 K/uL    


 


Hypogranular Neutrophils 2+    


 


Platelet Estimate DECREASED    


 


[place] 1+    


 


Prothrombin Time 12.3 SECONDS    


 


Prothromb Time International


Ratio 1.1 


  


  


  


 


 


Activated Partial


Thromboplast Time 68.6 SECONDS 


  


  


  


 


 


Partial Thromboplastin Ratio 2.6    


 


Sodium Level 142 mmol/L    


 


Potassium Level 4.1 mmol/L    


 


Chloride Level 109 mmol/L    


 


Carbon Dioxide Level 20 mmol/L    


 


Anion Gap 13.0 mmol/L    


 


Blood Urea Nitrogen 29 mg/dl    


 


Creatinine 2.50 mg/dl    


 


Est Creatinine Clear Calc


Drug Dose 26.0 ml/min 


  


  


  


 


 


Estimated GFR (


American) 26.9 


  


  


  


 


 


Estimated GFR (Non-


American 23.2 


  


  


  


 


 


BUN/Creatinine Ratio 11.8    


 


Random Glucose 210 mg/dl    


 


Calcium Level 8.1 mg/dl    


 


Magnesium Level 1.7 mg/dl    


 


Bedside Glucose  269 mg/dl  68 mg/dl  68 mg/dl 














Test


  1/23/17


16:20 


  


  


 


 


Bedside Glucose 79 mg/dl    











Assessment and Plan


1. Non-STEMI with history of CAD. - can D/C heparin gtt per cardiology 

recommendation. start imdur 30mg daily also per cards. 


2. Acute on chronic renal insufficiency - stable and monitored. 


3. Anemia, H/o GI bleed following H&H. 


4. Prepatellar bursitis.


5. DM


6. HTN





Continued medical management for NSTEMI





Continue plavix,  ASA.  No Beta-blocker with prior symptomatic sinus bradycardia

## 2017-01-24 VITALS
TEMPERATURE: 98.24 F | DIASTOLIC BLOOD PRESSURE: 87 MMHG | HEART RATE: 81 BPM | SYSTOLIC BLOOD PRESSURE: 172 MMHG | OXYGEN SATURATION: 92 %

## 2017-01-24 VITALS — OXYGEN SATURATION: 90 % | HEART RATE: 74 BPM

## 2017-01-24 VITALS
OXYGEN SATURATION: 94 % | HEART RATE: 80 BPM | DIASTOLIC BLOOD PRESSURE: 81 MMHG | SYSTOLIC BLOOD PRESSURE: 155 MMHG | TEMPERATURE: 98.06 F

## 2017-01-24 VITALS
OXYGEN SATURATION: 94 % | TEMPERATURE: 97.7 F | DIASTOLIC BLOOD PRESSURE: 71 MMHG | HEART RATE: 66 BPM | SYSTOLIC BLOOD PRESSURE: 143 MMHG

## 2017-01-24 VITALS
OXYGEN SATURATION: 97 % | HEART RATE: 83 BPM | SYSTOLIC BLOOD PRESSURE: 192 MMHG | DIASTOLIC BLOOD PRESSURE: 75 MMHG | TEMPERATURE: 97.88 F

## 2017-01-24 VITALS
SYSTOLIC BLOOD PRESSURE: 161 MMHG | OXYGEN SATURATION: 94 % | DIASTOLIC BLOOD PRESSURE: 78 MMHG | HEART RATE: 66 BPM | TEMPERATURE: 97.88 F

## 2017-01-24 VITALS — OXYGEN SATURATION: 93 % | HEART RATE: 84 BPM

## 2017-01-24 VITALS
DIASTOLIC BLOOD PRESSURE: 67 MMHG | HEART RATE: 79 BPM | SYSTOLIC BLOOD PRESSURE: 147 MMHG | TEMPERATURE: 98.24 F | OXYGEN SATURATION: 98 %

## 2017-01-24 VITALS — OXYGEN SATURATION: 94 % | HEART RATE: 66 BPM

## 2017-01-24 VITALS — OXYGEN SATURATION: 96 %

## 2017-01-24 VITALS — OXYGEN SATURATION: 95 %

## 2017-01-24 LAB
ANION GAP SERPL CALC-SCNC: 11 MMOL/L (ref 3–11)
APPEARANCE SNV: (no result)
BUN SERPL-MCNC: 23 MG/DL (ref 7–18)
BUN/CREAT SERPL: 10 (ref 10–20)
CALCIUM SERPL-MCNC: 8.1 MG/DL (ref 8.5–10.1)
CHLORIDE SERPL-SCNC: 108 MMOL/L (ref 98–107)
CO2 SERPL-SCNC: 22 MMOL/L (ref 21–32)
COLOR SNV: (no result)
CREAT CL PREDICTED SERPL C-G-VRATE: 26 ML/MIN
CREAT SERPL-MCNC: 2.3 MG/DL (ref 0.6–1.4)
EOSINOPHIL NFR BLD AUTO: 85 K/UL (ref 130–400)
GLUCOSE SERPL-MCNC: 169 MG/DL (ref 70–99)
HCT VFR BLD CALC: 24.2 % (ref 42–52)
Lab: YES
MCH RBC QN AUTO: 29.9 PG (ref 25–34)
MCHC RBC AUTO-ENTMCNC: 33.9 G/DL (ref 32–36)
MCV RBC AUTO: 88.3 FL (ref 80–100)
PARTIAL THROMBOPLASTIN RATIO: 1.5
PLATELET # BLD EST: (no result) 10*3/UL
POTASSIUM SERPL-SCNC: 4 MMOL/L (ref 3.5–5.1)
RBC # BLD AUTO: 2.74 M/UL (ref 4.7–6.1)
SODIUM SERPL-SCNC: 141 MMOL/L (ref 136–145)
SYNOVIAL FLUID MONONUC RELAT: 2.9 %
SYNOVIAL FLUID POLYNUC RELAT: 97.1 %
WBC # BLD AUTO: 8.91 K/UL (ref 4.8–10.8)

## 2017-01-24 RX ADMIN — LEVALBUTEROL SCH MG: 1.25 SOLUTION, CONCENTRATE RESPIRATORY (INHALATION) at 07:14

## 2017-01-24 RX ADMIN — GUAIFENESIN SCH MG: 200 TABLET ORAL at 21:30

## 2017-01-24 RX ADMIN — MECLIZINE HYDROCHLORIDE SCH MG: 25 TABLET ORAL at 05:03

## 2017-01-24 RX ADMIN — HEPARIN SODIUM SCH UNIT: 10000 INJECTION, SOLUTION INTRAVENOUS; SUBCUTANEOUS at 21:00

## 2017-01-24 RX ADMIN — INSULIN ASPART SCH UNITS: 100 INJECTION, SOLUTION INTRAVENOUS; SUBCUTANEOUS at 12:25

## 2017-01-24 RX ADMIN — MECLIZINE HYDROCHLORIDE SCH MG: 25 TABLET ORAL at 00:00

## 2017-01-24 RX ADMIN — Medication SCH MG: at 08:26

## 2017-01-24 RX ADMIN — DOCUSATE SODIUM SCH MG: 100 CAPSULE, LIQUID FILLED ORAL at 21:29

## 2017-01-24 RX ADMIN — INSULIN GLARGINE SCH UNIT: 100 INJECTION, SOLUTION SUBCUTANEOUS at 08:34

## 2017-01-24 RX ADMIN — IPRATROPIUM BROMIDE SCH MG: 0.5 SOLUTION RESPIRATORY (INHALATION) at 19:48

## 2017-01-24 RX ADMIN — PIPERACILLIN AND TAZOBACTAM SCH MLS/HR: 3; .375 INJECTION, POWDER, LYOPHILIZED, FOR SOLUTION INTRAVENOUS; PARENTERAL at 12:22

## 2017-01-24 RX ADMIN — Medication SCH MCG: at 08:27

## 2017-01-24 RX ADMIN — IPRATROPIUM BROMIDE SCH MG: 0.5 SOLUTION RESPIRATORY (INHALATION) at 07:14

## 2017-01-24 RX ADMIN — HEPARIN SODIUM SCH UNIT: 10000 INJECTION, SOLUTION INTRAVENOUS; SUBCUTANEOUS at 21:33

## 2017-01-24 RX ADMIN — LEVALBUTEROL SCH MG: 1.25 SOLUTION, CONCENTRATE RESPIRATORY (INHALATION) at 03:14

## 2017-01-24 RX ADMIN — BACITRACIN SCH APPLN: 500 OINTMENT TOPICAL at 21:29

## 2017-01-24 RX ADMIN — INSULIN ASPART SCH UNITS: 100 INJECTION, SOLUTION INTRAVENOUS; SUBCUTANEOUS at 21:32

## 2017-01-24 RX ADMIN — Medication SCH MG: at 11:09

## 2017-01-24 RX ADMIN — ATORVASTATIN CALCIUM SCH MG: 40 TABLET, FILM COATED ORAL at 08:26

## 2017-01-24 RX ADMIN — GUAIFENESIN SCH MG: 200 TABLET ORAL at 08:26

## 2017-01-24 RX ADMIN — LEVALBUTEROL SCH MG: 1.25 SOLUTION, CONCENTRATE RESPIRATORY (INHALATION) at 11:19

## 2017-01-24 RX ADMIN — MECLIZINE HYDROCHLORIDE SCH MG: 25 TABLET ORAL at 12:22

## 2017-01-24 RX ADMIN — ISOSORBIDE MONONITRATE SCH MG: 30 TABLET, EXTENDED RELEASE ORAL at 08:26

## 2017-01-24 RX ADMIN — LEVALBUTEROL SCH MG: 1.25 SOLUTION, CONCENTRATE RESPIRATORY (INHALATION) at 19:48

## 2017-01-24 RX ADMIN — MECLIZINE HYDROCHLORIDE SCH MG: 25 TABLET ORAL at 17:36

## 2017-01-24 RX ADMIN — BUDESONIDE SCH MG: 0.5 SUSPENSION RESPIRATORY (INHALATION) at 19:48

## 2017-01-24 RX ADMIN — PANTOPRAZOLE SCH MG: 40 TABLET, DELAYED RELEASE ORAL at 08:35

## 2017-01-24 RX ADMIN — DOCUSATE SODIUM SCH MG: 100 CAPSULE, LIQUID FILLED ORAL at 08:27

## 2017-01-24 RX ADMIN — LEVALBUTEROL SCH MG: 1.25 SOLUTION, CONCENTRATE RESPIRATORY (INHALATION) at 15:22

## 2017-01-24 RX ADMIN — Medication SCH INTER.UNIT: at 08:28

## 2017-01-24 RX ADMIN — IPRATROPIUM BROMIDE SCH MG: 0.5 SOLUTION RESPIRATORY (INHALATION) at 11:19

## 2017-01-24 RX ADMIN — CLOPIDOGREL BISULFATE SCH MG: 75 TABLET, FILM COATED ORAL at 11:10

## 2017-01-24 RX ADMIN — IPRATROPIUM BROMIDE SCH MG: 0.5 SOLUTION RESPIRATORY (INHALATION) at 23:29

## 2017-01-24 RX ADMIN — BACITRACIN SCH APPLN: 500 OINTMENT TOPICAL at 11:25

## 2017-01-24 RX ADMIN — INSULIN ASPART SCH UNITS: 100 INJECTION, SOLUTION INTRAVENOUS; SUBCUTANEOUS at 08:35

## 2017-01-24 RX ADMIN — IPRATROPIUM BROMIDE SCH MG: 0.5 SOLUTION RESPIRATORY (INHALATION) at 15:22

## 2017-01-24 RX ADMIN — IPRATROPIUM BROMIDE SCH MG: 0.5 SOLUTION RESPIRATORY (INHALATION) at 03:14

## 2017-01-24 RX ADMIN — INSULIN ASPART SCH UNITS: 100 INJECTION, SOLUTION INTRAVENOUS; SUBCUTANEOUS at 17:29

## 2017-01-24 RX ADMIN — PIPERACILLIN AND TAZOBACTAM SCH MLS/HR: 3; .375 INJECTION, POWDER, LYOPHILIZED, FOR SOLUTION INTRAVENOUS; PARENTERAL at 04:08

## 2017-01-24 RX ADMIN — LEVALBUTEROL SCH MG: 1.25 SOLUTION, CONCENTRATE RESPIRATORY (INHALATION) at 23:29

## 2017-01-24 RX ADMIN — BUDESONIDE SCH MG: 0.5 SUSPENSION RESPIRATORY (INHALATION) at 07:14

## 2017-01-24 RX ADMIN — MECLIZINE HYDROCHLORIDE SCH MG: 25 TABLET ORAL at 23:32

## 2017-01-24 NOTE — NEPHROLOGY PROGRESS NOTE
Nephrology Progress Note


Date of Service


Jan 24, 2017.





Chief Complaint


Follow up evaluation of acute on chronic kidney injury





Subjective


Mr. Hummel was seen & examined in the PCU this morning.  He was admitted with a 

NSTEMI.  Cardiology has recommended medical management.  Patient currently 

denies angina.





The patient was admitted w/ left prepatellar bursitis.  He is on broad spectrum 

antibiotics.  He reports that his pain and swelling have improved.  He had 

follow up join aspiration performed 01/22/17.  5 cc bloody fluid obtained.  No 

purulent drainage.  Aspirate was not sent for culture.





Mr. Baron has phimosis.  He required a dorsal slit procedure this 

hospitalization.  He currently denies voiding difficulty.





Review of Systems


Constitutional:  No fever


Cardiovascular:  No chest pain


Respiratory:  No dyspnea at rest


Abdomen:  No pain


Extremities:  + problem reported (L knee discomfort)


A complete review of systems was performed.  Pertinent positives are noted 

above. All other systems are negative.





Vital Signs





Last 8 Hrs








  Date Time  Temp Pulse Resp B/P Pulse Ox O2 Delivery O2 Flow Rate FiO2


 


1/24/17 12:00      Nasal Cannula 2.0 


 


1/24/17 11:19  66 20  94 Room Air  


 


1/24/17 11:19 36.5 62 20 143/71 94 Room Air  


 


1/24/17 07:38 36.6 66 20 161/78 94 Room Air  


 


1/24/17 07:14  66 20  94 Room Air  











I & O











 24-Hour Column 


 


 1/24/17





 08:00


 


Intake Total 2130 ml


 


Output Total 300 ml


 


Balance 1830 ml











Last Recorded Weight


Weight (Kilograms):  87.500





Physical Exam


General Appearance:  no apparent distress


Head:  atraumatic


Eyes:  PERRL, EOMI


Neck:  no adenopathy


Respiratory/Chest:  lungs clear


Cardiovascular:  regular rate, rhythm


Abdomen/GI:  normal bowel sounds, non tender, soft


Extremities/Musculoskelatal:  + pertinent finding (Left knee with decreasing 

erythema and tenderness)


Neurologic/Psych:  alert, oriented x 3





Family History





Heart disease





Social History


Smokeless Tobacco Use:  No


Alcohol Use:  none


Drug Use:  none


Marital Status:  


Housing Status:  lives with family


Occupation:  retired





Laboratory Results


Past 24 Hours


1/24/17 06:03








1/24/17 06:03

















Test


  1/23/17


16:03 1/23/17


16:04 1/23/17


16:20 1/23/17


20:10


 


Bedside Glucose


  68 mg/dl


(70-99) 68 mg/dl


(70-99) 79 mg/dl


(70-99) 236 mg/dl


(70-99)














Test


  1/24/17


06:03 1/24/17


06:38 1/24/17


09:59 1/24/17


11:11


 


Red Blood Count


  2.74 M/uL


(4.7-6.1) 


  


  


 


 


Mean Corpuscular Volume


  88.3 fL


() 


  


  


 


 


Mean Corpuscular Hemoglobin


  29.9 pg


(25-34) 


  


  


 


 


Mean Corpuscular Hemoglobin


Concent 33.9 g/dl


(32-36) 


  


  


 


 


RDW Standard Deviation


  63.3 fL


(36.4-46.3) 


  


  


 


 


RDW Coefficient of Variation


  19.8 %


(11.5-14.5) 


  


  


 


 


Platelet Estimate DECREASED    


 


Activated Partial


Thromboplast Time 39.4 SECONDS


(21.0-31.0) 


  


  


 


 


Partial Thromboplastin Ratio 1.5    


 


Anion Gap


  11.0 mmol/L


(3-11) 


  


  


 


 


Est Creatinine Clear Calc


Drug Dose 26.0 ml/min 


  


  


  


 


 


Estimated GFR (


American) 29.8 


  


  


  


 


 


Estimated GFR (Non-


American 25.7 


  


  


  


 


 


BUN/Creatinine Ratio 10.0 (10-20)    


 


Calcium Level


  8.1 mg/dl


(8.5-10.1) 


  


  


 


 


Bedside Glucose


  


  174 mg/dl


(70-99) 261 mg/dl


(70-99) 214 mg/dl


(70-99)











Allergies


Coded Allergies:  


     No Known Allergies (Verified , 1/20/17)





Medications





 Current Inpatient Medications








 Medications


  (Trade)  Dose


 Ordered  Sig/Mariposa


 Route  Start Time


 Stop Time Status Last Admin


Dose Admin


 


 Atorvastatin


 Calcium


  (Lipitor Tab)  40 mg  DAILY


 PO  1/21/17 09:00


 2/20/17 08:59  1/24/17 08:26


40 MG


 


 Cholecalciferol


  (Vitamin D Tab)  1,000


 inter.unit  DAILY


 PO  1/21/17 09:00


 2/20/17 08:59  1/24/17 08:28


1,000 INTER.UNIT


 


 Clopidogrel


 Bisulfate


  (plAVix TAB)  75 mg  DAILY


 PO  1/21/17 09:00


 2/20/17 08:59  1/23/17 08:43


75 MG


 


 Cyanocobalamin


  (Vitamin B-12


 Tab)  1,000 mcg  DAILY


 PO  1/21/17 09:00


 2/20/17 08:59  1/24/17 08:27


1,000 MCG


 


 Meclizine HCl


  (Antivert Tab)  25 mg  Q6


 PO  1/20/17 18:00


 2/19/17 17:59  1/24/17 12:22


25 MG


 


 Acetaminophen


  (Tylenol Tab)  650 mg  Q4H  PRN


 PO  1/20/17 15:15


 2/19/17 15:14  1/23/17 21:35


650 MG


 


 Zolpidem Tartrate


  (Ambien Tab)  5 mg  HSZ  PRN


 PO  1/20/17 15:15


 2/19/17 15:14   


 


 


 Nitroglycerin


  (Nitrostat Tab)  0.4 mg  UD  PRN


 SL  1/20/17 15:15


 2/19/17 15:14  1/22/17 08:32


0.4 MG


 


 Lorazepam


  (Ativan Inj)  0.5 mg  Q4H  PRN


 IV  1/20/17 15:15


 2/19/17 15:14   


 


 


 Magnesium


 Hydroxide


  (Milk Of


 Magnesia Susp)  30 ml  Q6H  PRN


 PO  1/20/17 15:15


 2/19/17 15:14   


 


 


 Bisacodyl


  (Dulcolax Supp)  10 mg  DAILY  PRN


 AK  1/20/17 15:15


 2/19/17 15:14   


 


 


 Diphenhydramine


 HCl


  (Benadryl Inj)  25 mg  Q4H  PRN


 IV  1/20/17 15:15


 2/19/17 15:14   


 


 


 Al Hydrox/Mg


 Hydrox/


 Simethicone 15 ml  15 ml  Q4H  PRN


 PO  1/20/17 15:15


 2/19/17 15:14   


 


 


 Promethazine HCl/


 Sodium Chloride


  (Phenergan Inj/


 Nss 50ml)  50.5 ml @ 


 202 mls/hr  Q4H  PRN


 IV  1/20/17 15:15


 2/19/17 15:14   


 


 


 Ondansetron HCl


  (Zofran Inj)  4 mg  Q6H  PRN


 IV  1/20/17 15:15


 2/19/17 15:14   


 


 


 Docusate Sodium


  (coLACE CAP)  100 mg  BID


 PO  1/20/17 21:00


 2/19/17 20:59  1/24/17 08:27


100 MG


 


 Morphine Sulfate


  (MoRPHine


 SULFATE INJ)  2 mg  Q2H  PRN


 IV  1/20/17 15:15


 2/3/17 15:14  1/22/17 09:25


2 MG


 


 Insulin Aspart


  (novoLOG ASPART)  **SLIDING


 SCALE**


 **If C...  ACHS


 SC  1/20/17 16:00


 2/19/17 15:59  1/24/17 12:25


6 UNITS


 


 Glucose


  (Glucose 40% Gel)    UD  PRN


 PO  1/20/17 15:15


 2/19/17 15:14   


 


 


 Glucose


  (Glucose Chew


 Tab)  1 tabs  UD  PRN


 PO  1/20/17 15:15


 2/19/17 15:14   


 


 


 Dextrose


  (Dextrose 50%


 50ML Syringe)  50 ml  UD  PRN


 IV  1/20/17 15:15


 2/19/17 15:14   


 


 


 Glucagon


  (Glucagon Inj)  1 mg  UD  PRN


 SQ  1/20/17 15:15


 2/19/17 15:14   


 


 


 Insulin Glargine


 20 unit  20 unit  QAM


 SQ  1/21/17 09:00


 2/20/17 08:59  1/24/17 08:34


20 UNIT


 


 Piperacillin Sod/


 Tazobactam Sod/


 Dextrose


  (Zosyn Iv/D5


 100ml)  115 ml @ 


 28.75 mls/


 hr  Q8H


 IV  1/20/17 20:00


 1/27/17 19:59  1/24/17 12:22


28.75 MLS/HR


 


 Guaifenesin


  (Organidin Nr


 Tab)  600 mg  BID


 PO  1/20/17 21:00


 2/19/17 20:59  1/24/17 08:26


600 MG


 


 Budesonide


  (Pulmicort


 Respules 0.5MG/


 2ML Neb Soln)  0.5 mg  BIDR


 INH  1/20/17 20:00


 2/19/17 19:59  1/24/17 07:14


0.5 MG


 


 Piperacillin Sod/


 Tazobactam Sod


  (Consult)  1 ea  UD  PRN


 N/A  1/20/17 16:15


 2/19/17 16:14   


 


 


 Bacitracin


  (Bacitracin Oint)  1 appln  BID


 EXT  1/21/17 21:00


 1/26/17 09:01  1/24/17 11:25


1 APPLN


 


 Ipratropium


 Bromide


  (Atrovent 0.02%


 0.5MG/2.5ML Neb)  0.5 mg  Q4R


 INH  1/21/17 16:00


 2/19/17 20:59  1/24/17 11:19


0.5 MG


 


 Levalbuterol


  (Xopenex 1.25MG/


 0.5ML Neb)  1.25 mg  Q4R


 INH  1/21/17 16:00


 2/19/17 20:59  1/24/17 11:19


1.25 MG


 


 Oxycodone HCl


  (Roxicodone


 Immediate Rel Tab)  5 mg  Q4  PRN


 PO  1/21/17 14:45


 2/4/17 14:44  1/21/17 15:28


5 MG


 


 Magnesium Oxide


  (Mag-Ox Tab)  400 mg  QAM


 PO  1/23/17 09:00


 2/22/17 08:59  1/24/17 08:26


400 MG


 


 Aspirin


  (Ecotrin Tab)  81 mg  QAM


 PO  1/22/17 11:00


 2/21/17 10:59  1/23/17 08:43


81 MG


 


 Pantoprazole


 Sodium


  (Protonix Tab)  40 mg  QAM


 PO  1/24/17 09:00


 2/23/17 08:59  1/24/17 08:35


40 MG


 


 Isosorbide


 Mononitrate


  (Imdur Ext Rel


 Tab)  30 mg  QAM


 PO  1/24/17 09:00


 2/23/17 08:59  1/24/17 08:26


30 MG











Impression





(1) CKD (chronic kidney disease), stage IV


(2) Acute kidney injury superimposed on chronic kidney disease


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


(4) ACS (acute coronary syndrome)


Mr. Chase Hummel is an 81-year-old male w/ ASCVD, DM, HTN, dyslipidemia, MGUS 

with chronic anemia / thrombocytopenia and stage IV CKD.   Baseline creatinine 

has been 2.0 - 2.2 mg/dL.  He has stated that he would not consider 

hemodialysis during prior discussions.  He has an extensive history of 

cardiovascular disease.  He presented to the ED with hypotension, hypoglycemia, 

generalized weakness, shortness of breath.  He has been struggling recently 

with right knee prepatellar bursitis.  He was admitted to rule-out ACS with 

hyperglycemia and some evidence of acute on chronic renal insufficiency in 

addition to possible sepsis.





Recommendations


ACUTE KIDNEY INJURY:


-- Improving.  Baseline creatinine has been 2.0 - 2.2.  Will ask RN staff to 

record all UO


-- Volume status and electrolyte balance remain acceptable at this time


-- Monitor PRP





CHRONIC KIDNEY DISEASE:


-- Baseline creatinine 2.0 - 2.2


-- Mr. Hummel has expressed multiple times in the past that he would not 

consider dialysis under any circumstances.  





ASCVD:


-- CPK normal.  Troponin is trending down


-- Medical management as per cardiology.  Patient is on ASA, plavix and 

nitrate.  Beta blocker held due to h/o symptomatic bradycardia





ANEMIA:


-- H/o MGUS and ASCVD


-- Recommend blood transfusion to maintain Hgb > or = 8.0





ID:


-- L prepatellar bursitis.  Patient is on empiric IV Zosyn.  Area of erythema 

appears to be much less.  Patient is clinically improved 





BPH & PHIMOSIS:


-- Dorsal slit procedure performed 01/22/17 by urology

## 2017-01-24 NOTE — PROGRESS NOTE
Progress Note


Came by to check on the patient as he has had some bleeding after his bedside 

dorsal slit this weekend





- denies any pain


- reports he is voiding better than he has in several years





- incision with some superficial clot, no active bleeding.


- still with expected degree of edema


- reassured the patient and his wife


- no need for any further intervention right now

## 2017-01-24 NOTE — HOSPITALIST PROGRESS NOTE
Hospitalist Progress Note


Date of Service


Jan 24, 2017.





Subjective


Pt evaluation today including:  conversation w/ patient, conversation w/ family

, physical exam, chart review, lab review, review of studies, review of 

inpatient medication list


I met the patients wife who was in the room with him today. He has had no 

further chest pain.  Ortho planning to aspirate the left knee shortly after I 

was finish seeing the patient.





   Additional Comments:


A 10 system review was performed and all were negative.  Positives were placed 

in the subjective section.





Objective


Vital Signs











  Date Time  Temp Pulse Resp B/P Pulse Ox O2 Delivery O2 Flow Rate FiO2


 


1/24/17 19:25 36.7 80 16 155/81 94 Room Air  


 


1/24/17 15:22  79 20  90 Room Air  


 


1/24/17 15:22 36.8 61 20 147/67 98 Room Air  


 


1/24/17 12:00      Nasal Cannula 2.0 


 


1/24/17 11:19  66 20  94 Room Air  


 


1/24/17 11:19 36.5 62 20 143/71 94 Room Air  


 


1/24/17 07:38 36.6 66 20 161/78 94 Room Air  


 


1/24/17 07:14  66 20  94 Room Air  


 


1/24/17 04:31 36.8 81 19 172/87 92 Room Air  


 


1/24/17 04:00      Nasal Cannula 2.0 


 


1/24/17 00:00      Nasal Cannula 2.0 


 


1/23/17 23:49  83 20  94 Room Air  


 


1/23/17 23:07 36.6 60 20 166/73 97 Room Air  


 


1/23/17 20:23 36.6 64 20 159/71 93 Room Air  


 


1/23/17 20:00      Nasal Cannula 2.0 











Physical Exam


Notes:


GEN: Awake, alert, and oriented x 3. Not in acute distress


HEENT: Tm's intact, no inflammation, EOMI, PERRLA, MMM


Neck: Soft, supple


Lungs: CTA b/l, no r/r/w


Heart: REG, nrl S1S2 without murmurs, rubs or gallops


Abdomen: Soft, NT, ND, + BS


EXT: No C/C/E  Swelling over the patella on left with purple discoloration to 

the skin surface. 


NEURO: CN's II-XII grossly intact, non-focal


Skin: warm, dry, no rashes


PSYCH: pleasant, cooperative, no signs of significant anxiety or depression.





Laboratory Results





Last 24 Hours








Test


  1/23/17


20:10 1/24/17


00:00 1/24/17


06:03 1/24/17


06:38


 


Bedside Glucose 236 mg/dl    174 mg/dl 


 


Synovial Fluid Source  KNEE   


 


Synovial Fluid Color  RED   


 


Synovial Fluid Appearance  BLOODY   


 


Synovial Fluid WBC  26438 /uL   


 


Synovial Fluid RBC  [id number] /uL   


 


Synovial Fluid Polynuclear


WBCs % 


  97.1 % 


  


  


 


 


Synovial Fluid Mononuclear


WBCs % 


  2.9 % 


  


  


 


 


White Blood Count   8.91 K/uL  


 


Red Blood Count   2.74 M/uL  


 


Hemoglobin   8.2 g/dL  


 


Hematocrit   24.2 %  


 


Mean Corpuscular Volume   88.3 fL  


 


Mean Corpuscular Hemoglobin   29.9 pg  


 


Mean Corpuscular Hemoglobin


Concent 


  


  33.9 g/dl 


  


 


 


RDW Standard Deviation   63.3 fL  


 


RDW Coefficient of Variation   19.8 %  


 


Platelet Count   85 K/uL  


 


Platelet Estimate   DECREASED  


 


Activated Partial


Thromboplast Time 


  


  39.4 SECONDS 


  


 


 


Partial Thromboplastin Ratio   1.5  


 


Sodium Level   141 mmol/L  


 


Potassium Level   4.0 mmol/L  


 


Chloride Level   108 mmol/L  


 


Carbon Dioxide Level   22 mmol/L  


 


Anion Gap   11.0 mmol/L  


 


Blood Urea Nitrogen   23 mg/dl  


 


Creatinine   2.30 mg/dl  


 


Est Creatinine Clear Calc


Drug Dose 


  


  26.0 ml/min 


  


 


 


Estimated GFR (


American) 


  


  29.8 


  


 


 


Estimated GFR (Non-


American 


  


  25.7 


  


 


 


BUN/Creatinine Ratio   10.0  


 


Random Glucose   169 mg/dl  


 


Calcium Level   8.1 mg/dl  














Test


  1/24/17


09:59 1/24/17


11:11 


  


 


 


Bedside Glucose 261 mg/dl  214 mg/dl   











Assessment and Plan


1. Non-STEMI with history of CAD. - .  imdur 30mg daily. . 


2. Acute on chronic renal insufficiency - stable and monitored. 


3. Anemia, H/o GI bleed following H&H. 


4. Prepatellar bursitis aspiration today, await analysis results. 


5. DM


6. HTN





Continued medical management for NSTEMI





Continue plavix,  ASA.  No Beta-blocker with prior symptomatic sinus bradycardia





DVT prophylaxis - heparin gtt d/c'd thus I ordered sub-q heparin for 

prophylaxis after the aspiration was completed.

## 2017-01-24 NOTE — PROGRESS NOTE
DATE: 01/24/2017

 

SUBJECTIVE:  An 81-year-old gentleman admitted with medical reasons and

we were consulted for left knee hemorrhagic prepatellar bursitis and possible 
infection.  He had a

painful night last night, but doing better today.  Mostly pain is in the

lateral side of his knee.  It is very manageable.  He removed

the Ace bandage and he seems much more comfortable.

 

OBJECTIVE:

VITAL SIGNS:  Temperature 36.8.  Vital signs stable.

 

PHYSICAL EXAMINATION:

Examination of the left knee reveals a boggy swollen prepatellar bursa. 

Any redness is completely resolved.  He does have kind of a thickened

indurated area laterally which is a bit more tender.  He can do a straight

leg raise.  There are no signs of knee effusion.  Range of motion is 0-90

degrees with fairly minor pain.  He is neurologically intact.

 

LABORATORIES:  White cell count 8.91.  Sed rate is elevated at 58. 

C-reactive protein is also elevated at 8.23.

 

ASSESSMENT:  An 81-year-old gentleman admitted for medical reasons and with

left knee hemorrhagic prepatellar bursitis.  There

may have been associated cellulitis, but there is no sign of abscess.  We did

aspirate this before, it seems a little bit softer today and we will try and

aspirate it again today.  There are no signs of pus or surgical indication. 

Any cellulitis seems to have resolved.  I do not think he has got a major

infection in this area.

 

PLAN:

We are going to aspirate this prepatellar bursa area again today and try to get

more fluid.  We will send it off for analysis.  I do not think there is any

surgical indication at this point.  I do think it is probably best to

continue on antibiotics and he can be switched to p.o. antibiotics from my

standpoint for about a 2-week period of time and we will follow him up in

clinic.  We did attempt to aspirate this and just got about 5-10 mL of bloody

fluid.  It did not look purulent at all.  We did send it off for analysis.

 

We aspirated the prepatellar bursa again today.  We sent it off for analysis.

 He can be active within limits of pain or discomfort.  I think he can be 
switched to

oral antibiotics from his leg standpoint and discharged with a 2-week

followup in my clinic.  Any questions can be directed to me at [phone].

 

The patient's left knee was prepped with alcohol.  We aspirated multiple

areas and just got about 5-10 mL of bloody fluid.  The patient tolerated the

procedure well with no complications.

 

 

 

 

LUCAS

## 2017-01-25 VITALS
HEART RATE: 84 BPM | SYSTOLIC BLOOD PRESSURE: 166 MMHG | OXYGEN SATURATION: 95 % | TEMPERATURE: 98.06 F | DIASTOLIC BLOOD PRESSURE: 83 MMHG

## 2017-01-25 VITALS
HEART RATE: 71 BPM | SYSTOLIC BLOOD PRESSURE: 137 MMHG | OXYGEN SATURATION: 95 % | DIASTOLIC BLOOD PRESSURE: 74 MMHG | TEMPERATURE: 97.88 F

## 2017-01-25 VITALS
TEMPERATURE: 98.06 F | DIASTOLIC BLOOD PRESSURE: 78 MMHG | HEART RATE: 88 BPM | OXYGEN SATURATION: 95 % | SYSTOLIC BLOOD PRESSURE: 159 MMHG

## 2017-01-25 VITALS
OXYGEN SATURATION: 97 % | DIASTOLIC BLOOD PRESSURE: 68 MMHG | TEMPERATURE: 97.88 F | SYSTOLIC BLOOD PRESSURE: 127 MMHG | HEART RATE: 69 BPM

## 2017-01-25 VITALS
TEMPERATURE: 97.7 F | HEART RATE: 62 BPM | SYSTOLIC BLOOD PRESSURE: 136 MMHG | OXYGEN SATURATION: 94 % | DIASTOLIC BLOOD PRESSURE: 69 MMHG

## 2017-01-25 VITALS — OXYGEN SATURATION: 96 % | HEART RATE: 90 BPM

## 2017-01-25 VITALS
TEMPERATURE: 98.6 F | HEART RATE: 65 BPM | SYSTOLIC BLOOD PRESSURE: 176 MMHG | DIASTOLIC BLOOD PRESSURE: 80 MMHG | OXYGEN SATURATION: 96 %

## 2017-01-25 VITALS — HEART RATE: 91 BPM | OXYGEN SATURATION: 97 %

## 2017-01-25 VITALS — HEART RATE: 63 BPM | OXYGEN SATURATION: 94 %

## 2017-01-25 VITALS — OXYGEN SATURATION: 94 %

## 2017-01-25 VITALS — HEART RATE: 65 BPM | OXYGEN SATURATION: 96 %

## 2017-01-25 VITALS — OXYGEN SATURATION: 96 %

## 2017-01-25 VITALS — HEART RATE: 91 BPM | OXYGEN SATURATION: 94 %

## 2017-01-25 VITALS — HEART RATE: 66 BPM | OXYGEN SATURATION: 95 %

## 2017-01-25 LAB
ANION GAP SERPL CALC-SCNC: 11 MMOL/L (ref 3–11)
BUN SERPL-MCNC: 22 MG/DL (ref 7–18)
BUN/CREAT SERPL: 9.7 (ref 10–20)
CALCIUM SERPL-MCNC: 8.2 MG/DL (ref 8.5–10.1)
CHLORIDE SERPL-SCNC: 109 MMOL/L (ref 98–107)
CO2 SERPL-SCNC: 21 MMOL/L (ref 21–32)
CREAT CL PREDICTED SERPL C-G-VRATE: 26 ML/MIN
CREAT SERPL-MCNC: 2.3 MG/DL (ref 0.6–1.4)
EOSINOPHIL NFR BLD AUTO: 95 K/UL (ref 130–400)
GLUCOSE SERPL-MCNC: 191 MG/DL (ref 70–99)
HCT VFR BLD CALC: 24.9 % (ref 42–52)
MCH RBC QN AUTO: 29.1 PG (ref 25–34)
MCHC RBC AUTO-ENTMCNC: 32.5 G/DL (ref 32–36)
MCV RBC AUTO: 89.6 FL (ref 80–100)
PLATELET # BLD EST: (no result) 10*3/UL
POTASSIUM SERPL-SCNC: 4.1 MMOL/L (ref 3.5–5.1)
RBC # BLD AUTO: 2.78 M/UL (ref 4.7–6.1)
SODIUM SERPL-SCNC: 141 MMOL/L (ref 136–145)
WBC # BLD AUTO: 7.52 K/UL (ref 4.8–10.8)

## 2017-01-25 RX ADMIN — LEVALBUTEROL SCH MG: 1.25 SOLUTION, CONCENTRATE RESPIRATORY (INHALATION) at 23:15

## 2017-01-25 RX ADMIN — Medication SCH MG: at 08:14

## 2017-01-25 RX ADMIN — ATORVASTATIN CALCIUM SCH MG: 40 TABLET, FILM COATED ORAL at 08:12

## 2017-01-25 RX ADMIN — INSULIN GLARGINE SCH UNIT: 100 INJECTION, SOLUTION SUBCUTANEOUS at 08:22

## 2017-01-25 RX ADMIN — LEVALBUTEROL SCH MG: 1.25 SOLUTION, CONCENTRATE RESPIRATORY (INHALATION) at 19:04

## 2017-01-25 RX ADMIN — Medication SCH MG: at 09:00

## 2017-01-25 RX ADMIN — LEVALBUTEROL SCH MG: 1.25 SOLUTION, CONCENTRATE RESPIRATORY (INHALATION) at 15:25

## 2017-01-25 RX ADMIN — PANTOPRAZOLE SCH MG: 40 TABLET, DELAYED RELEASE ORAL at 08:14

## 2017-01-25 RX ADMIN — LEVALBUTEROL SCH MG: 1.25 SOLUTION, CONCENTRATE RESPIRATORY (INHALATION) at 03:08

## 2017-01-25 RX ADMIN — Medication SCH INTER.UNIT: at 08:14

## 2017-01-25 RX ADMIN — INSULIN ASPART SCH UNITS: 100 INJECTION, SOLUTION INTRAVENOUS; SUBCUTANEOUS at 17:13

## 2017-01-25 RX ADMIN — DOCUSATE SODIUM SCH MG: 100 CAPSULE, LIQUID FILLED ORAL at 21:07

## 2017-01-25 RX ADMIN — MECLIZINE HYDROCHLORIDE SCH MG: 25 TABLET ORAL at 06:15

## 2017-01-25 RX ADMIN — BUDESONIDE SCH MG: 0.5 SUSPENSION RESPIRATORY (INHALATION) at 19:04

## 2017-01-25 RX ADMIN — MECLIZINE HYDROCHLORIDE SCH MG: 25 TABLET ORAL at 12:10

## 2017-01-25 RX ADMIN — LEVALBUTEROL SCH MG: 1.25 SOLUTION, CONCENTRATE RESPIRATORY (INHALATION) at 11:13

## 2017-01-25 RX ADMIN — ISOSORBIDE MONONITRATE SCH MG: 30 TABLET, EXTENDED RELEASE ORAL at 08:13

## 2017-01-25 RX ADMIN — IPRATROPIUM BROMIDE SCH MG: 0.5 SOLUTION RESPIRATORY (INHALATION) at 15:25

## 2017-01-25 RX ADMIN — IPRATROPIUM BROMIDE SCH MG: 0.5 SOLUTION RESPIRATORY (INHALATION) at 11:13

## 2017-01-25 RX ADMIN — GUAIFENESIN SCH MG: 200 TABLET ORAL at 08:15

## 2017-01-25 RX ADMIN — INSULIN ASPART SCH UNITS: 100 INJECTION, SOLUTION INTRAVENOUS; SUBCUTANEOUS at 08:21

## 2017-01-25 RX ADMIN — GUAIFENESIN SCH MG: 200 TABLET ORAL at 21:09

## 2017-01-25 RX ADMIN — LEVALBUTEROL SCH MG: 1.25 SOLUTION, CONCENTRATE RESPIRATORY (INHALATION) at 06:49

## 2017-01-25 RX ADMIN — IPRATROPIUM BROMIDE SCH MG: 0.5 SOLUTION RESPIRATORY (INHALATION) at 03:08

## 2017-01-25 RX ADMIN — IPRATROPIUM BROMIDE SCH MG: 0.5 SOLUTION RESPIRATORY (INHALATION) at 23:15

## 2017-01-25 RX ADMIN — HEPARIN SODIUM SCH UNIT: 10000 INJECTION, SOLUTION INTRAVENOUS; SUBCUTANEOUS at 09:00

## 2017-01-25 RX ADMIN — INSULIN ASPART SCH UNITS: 100 INJECTION, SOLUTION INTRAVENOUS; SUBCUTANEOUS at 12:12

## 2017-01-25 RX ADMIN — MECLIZINE HYDROCHLORIDE SCH MG: 25 TABLET ORAL at 18:27

## 2017-01-25 RX ADMIN — INSULIN ASPART SCH UNITS: 100 INJECTION, SOLUTION INTRAVENOUS; SUBCUTANEOUS at 21:14

## 2017-01-25 RX ADMIN — Medication SCH MCG: at 08:13

## 2017-01-25 RX ADMIN — IPRATROPIUM BROMIDE SCH MG: 0.5 SOLUTION RESPIRATORY (INHALATION) at 06:49

## 2017-01-25 RX ADMIN — CLOPIDOGREL BISULFATE SCH MG: 75 TABLET, FILM COATED ORAL at 09:00

## 2017-01-25 RX ADMIN — IPRATROPIUM BROMIDE SCH MG: 0.5 SOLUTION RESPIRATORY (INHALATION) at 19:04

## 2017-01-25 RX ADMIN — HEPARIN SODIUM SCH UNIT: 10000 INJECTION, SOLUTION INTRAVENOUS; SUBCUTANEOUS at 21:15

## 2017-01-25 RX ADMIN — BACITRACIN SCH APPLN: 500 OINTMENT TOPICAL at 08:17

## 2017-01-25 RX ADMIN — BACITRACIN SCH APPLN: 500 OINTMENT TOPICAL at 21:09

## 2017-01-25 RX ADMIN — DOCUSATE SODIUM SCH MG: 100 CAPSULE, LIQUID FILLED ORAL at 08:13

## 2017-01-25 NOTE — PROGRESS NOTE
Progress Note


Patient is 4 days post dorsal slit


He had some bleeding from one of the incisions yesterday basically just a very 

slow ooze


My partner came to evaluate him at that time and it appeared that the bleeding 

had stopped


Overnight though the bleeding again started again just a very slow ooze


On inspection it appeared that the issues came from the inferior portion of the 

skin from the dorsal slit on the right-hand side


Since the oozing seems to be intermittent but persistent I prepped his phallus 

with Betadine and draped in a sterile fashion and a numb the inferior portion 

of the right side dorsal slit  skin and then using a 3-0 chromic and put a 

figure-of-eight stitch in which stopped the bleeding.  I also used silver 

nitrate sticks around the edge to cauterize any other areas of ooze although I 

didn't see any at this time.  Patient said he is voiding much better since 

having the dorsal slit.  He tolerated the procedure well we'll watch him for 

signs of recurrent bleeding.

## 2017-01-25 NOTE — PROGRESS NOTE
Subjective


Date of Service:


Jan 25, 2017.


Subjective


Pt evaluation today including:  conversation w/ patient, chart review, lab 

review


Voiding:  no voiding problems


82 yo male s/p dorsal slit. 


Pt reports he is voiding well on his own, but area of the dorsal slit is 

bleeding. 


He is currently on Heparin, Plavix, and ASA.


Denies other pain today.





Problem List


Medical Problems:


(1) Cardiac ischemia


Status: Acute  





(2) Chest pain


Status: Acute  





(3) Dyspnea on exertion


Status: Acute  





(4) Renal insufficiency


Status: Acute  





(5) Sepsis


Status: Acute  





(6) Shortness of breath


Status: Acute  





(7) SOB (shortness of breath)


Status: Acute  





(8) Symptomatic anemia


Status: Acute  





(9) Upper GI bleeding


Status: Acute  





(10) Vertigo


Status: Acute  





(11) Weakness


Status: Acute  











Review of Systems


Constitutional:  No chills, No fever


Respiratory:  No shortness of breath


Cardiac:  No chest pain


Abdomen:  No nausea, No pain, No vomiting


Male :  No dysuria


Heme:  + abnormal bleeding/bruising (dorsal slit incision)





Objective


Vital Signs











  Date Time  Temp Pulse Resp B/P Pulse Ox O2 Delivery O2 Flow Rate FiO2


 


1/25/17 07:35 36.6 71 18 137/74 95 Room Air  


 


1/25/17 06:49  90 18  96 Room Air  


 


1/25/17 04:10 36.7 88 18 159/78 95 Room Air  


 


1/25/17 04:00      Room Air  


 


1/25/17 03:08  91 18  97 Room Air  


 


1/24/17 23:59      Room Air  


 


1/24/17 23:29  84 18  93 Room Air  


 


1/24/17 23:15 36.6 83 18 192/75 97 Room Air  


 


1/24/17 20:00     95 Room Air  


 


1/24/17 19:48  74 20  90 Room Air  


 


1/24/17 19:25 36.7 80 16 155/81 94 Room Air  


 


1/24/17 16:00     96 Room Air  


 


1/24/17 15:22  79 20  90 Room Air  


 


1/24/17 15:22 36.8 61 20 147/67 98 Room Air  


 


1/24/17 12:00      Nasal Cannula 2.0 


 


1/24/17 11:19  66 20  94 Room Air  


 


1/24/17 11:19 36.5 62 20 143/71 94 Room Air  











Physical Exam


General Appearance:  no apparent distress


Eyes:  normal inspection


ENT:  hearing grossly normal


Neck:  no JVD


Respiratory/Chest:  no respiratory distress, no accessory muscle use


Cardiovascular:  no JVD


Extremities:  normal inspection


Neurologic/Psychiatric:  alert, normal mood/affect, oriented x 3


Comments:


Significant amount of blood noted around dorsal slit and in brief this AM. No 

areas of active bleeding visualized.





Laboratory Results





Last 24 Hours








Test


  1/24/17


09:59 1/24/17


11:11 1/24/17


16:20 1/24/17


20:16


 


Bedside Glucose 261 mg/dl  214 mg/dl  186 mg/dl  229 mg/dl 














Test


  1/25/17


06:34 


  


  


 


 


Sodium Level 141 mmol/L    


 


Potassium Level 4.1 mmol/L    


 


Chloride Level 109 mmol/L    


 


Carbon Dioxide Level 21 mmol/L    


 


Anion Gap 11.0 mmol/L    


 


Blood Urea Nitrogen 22 mg/dl    


 


Creatinine 2.30 mg/dl    


 


Est Creatinine Clear Calc


Drug Dose 26.0 ml/min 


  


  


  


 


 


Estimated GFR (


American) 29.8 


  


  


  


 


 


Estimated GFR (Non-


American 25.7 


  


  


  


 


 


BUN/Creatinine Ratio 9.7    


 


Random Glucose 191 mg/dl    


 


Calcium Level 8.2 mg/dl    











Assessment and Plan


S/p dorsal slit. 





AFVSS. 


Significant amount of blood noted around dorsal slit and in brief this AM. No 

areas of active bleeding visualized. 


Will have nursing cleanse the wound twice daily with soap and water and apply 

Bacitracin ointment and sterile 4x4s to the area.


Consider stopping anticoagulation while actively bleeding if able. 


Continue to monitor H&H. 


Discussed the pt's bleeding with Dr. Barrett this morning. He will stop by to 

check the incision at some point today.

## 2017-01-25 NOTE — HOSPITALIST PROGRESS NOTE
Hospitalist Progress Note


Date of Service


Jan 25, 2017.





Subjective


Pt evaluation today including:  conversation w/ patient, physical exam, chart 

review, lab review, review of studies, review of inpatient medication list


Patient is feeling better.  He continued to bleed from the dorsal slit and 

required suturing. I would like to watch him overnight and make sure that he 

has no further bleeding and discharge to home. He is pleased with this plan.  I 

spoke with the patients wife updating her on his conditions and plan of care.  

She was in agreement with discharge tomorrow.





   Additional Comments:


A 10 system review was performed and all were negative.  Positives were placed 

in the subjective section.





Objective


Vital Signs











  Date Time  Temp Pulse Resp B/P Pulse Ox O2 Delivery O2 Flow Rate FiO2


 


1/25/17 19:08 37.0 65 20 176/80 96 Room Air  


 


1/25/17 19:05  65 18  96 Room Air  


 


1/25/17 16:00     94 Room Air  


 


1/25/17 15:26  63 18  94 Room Air  


 


1/25/17 15:23 36.5 62 18 136/69 94 Room Air  


 


1/25/17 12:00      Room Air  


 


1/25/17 11:14 36.6 69 22 127/68 97 Room Air  


 


1/25/17 11:13  66 18  95 Room Air  


 


1/25/17 08:00      Room Air  


 


1/25/17 07:35 36.6 71 18 137/74 95 Room Air  


 


1/25/17 06:49  90 18  96 Room Air  


 


1/25/17 04:10 36.7 88 18 159/78 95 Room Air  


 


1/25/17 04:00      Room Air  


 


1/25/17 03:08  91 18  97 Room Air  


 


1/24/17 23:59      Room Air  


 


1/24/17 23:29  84 18  93 Room Air  


 


1/24/17 23:15 36.6 83 18 192/75 97 Room Air  


 


1/24/17 20:00     95 Room Air  


 


1/24/17 19:48  74 20  90 Room Air  


 


1/24/17 19:25 36.7 80 16 155/81 94 Room Air  











Physical Exam


Notes:


GEN: Awake, alert, and oriented x 3. Not in acute distress


HEENT: Tm's intact, no inflammation, EOMI, PERRLA, MMM


Neck: Soft, supple


Lungs: CTA b/l, no r/r/w


Heart: REG, nrl S1S2 without murmurs, rubs or gallops


Abdomen: Soft, NT, ND, + BS


EXT: No C/C/E  There is much less swelling over the patella on left with purple 

discoloration to the skin surface. 


NEURO: CN's II-XII grossly intact, non-focal


Skin: warm, dry, no rashes


PSYCH: pleasant, cooperative, no signs of significant anxiety or depression.





Laboratory Results





Last 24 Hours








Test


  1/24/17


20:16 1/25/17


06:34 1/25/17


09:45 1/25/17


11:20


 


Bedside Glucose 229 mg/dl    220 mg/dl 


 


Sodium Level  141 mmol/L   


 


Potassium Level  4.1 mmol/L   


 


Chloride Level  109 mmol/L   


 


Carbon Dioxide Level  21 mmol/L   


 


Anion Gap  11.0 mmol/L   


 


Blood Urea Nitrogen  22 mg/dl   


 


Creatinine  2.30 mg/dl   


 


Est Creatinine Clear Calc


Drug Dose 


  26.0 ml/min 


  


  


 


 


Estimated GFR (


American) 


  29.8 


  


  


 


 


Estimated GFR (Non-


American 


  25.7 


  


  


 


 


BUN/Creatinine Ratio  9.7   


 


Random Glucose  191 mg/dl   


 


Calcium Level  8.2 mg/dl   


 


White Blood Count   7.52 K/uL  


 


Red Blood Count   2.78 M/uL  


 


Hemoglobin   8.1 g/dL  


 


Hematocrit   24.9 %  


 


Mean Corpuscular Volume   89.6 fL  


 


Mean Corpuscular Hemoglobin   29.1 pg  


 


Mean Corpuscular Hemoglobin


Concent 


  


  32.5 g/dl 


  


 


 


RDW Standard Deviation   64.5 fL  


 


RDW Coefficient of Variation   19.6 %  


 


Platelet Count   95 K/uL  


 


Platelet Estimate   DECREASED  














Test


  1/25/17


16:04 


  


  


 


 


Bedside Glucose 173 mg/dl    











Assessment and Plan


1. Non-STEMI with history of CAD. - .  imdur 30mg daily. . 


2. Acute on chronic renal insufficiency - stable and monitored. 


3. Anemia, H/o GI bleed following H&H. 


4. Prepatellar bursitis aspiration today, await analysis results. 


5. DM


6. HTN





Continued medical management for NSTEMI





Continue plavix,  ASA.  No Beta-blocker with prior symptomatic sinus bradycardia





DVT prophylaxis - sub-q heparin





Discharge to home planned for tomorrow.

## 2017-01-25 NOTE — PROGRESS NOTE
DATE: 01/25/2017

 

SUBJECTIVE:  An 81-year-old gentleman admitted by the medicine service and we

were consulted for a prepatellar bursitis.  He seems to be doing better.  He

really does not complain of much pain in his leg today.

 

OBJECTIVE:

VITAL SIGNS:  Temperature 36.6.  Vital signs stable.

EXTREMITIES:  Physical examination of the left knee and leg reveals a

thickened boggy prepatellar bursa.  There is pretty minimal warmth.  Minimal

redness to this.  There is no knee effusion.  He can flex his knee to 90

degrees without much pain.  Good straight leg raise.  He is neurologically 

intact.

 

LABS:  His white cell count is normal.  His prepatellar bursa aspirate

revealed mostly all red blood cells.  There were 12,000 white cells.  Culture

is pending.

 

ASSESSMENT:  An 81-year-old gentleman admitted with a hemorrhagic prepatellar

bursitis.  There may have been some surrounding cellulitis, but if so appears 
markedly

improved.  We attempted to aspirate this and it is just blood.  No pus.

 

PLAN:  From the orthopedic standpoint now, he can be discharged at any time. 

I would recommend he be discharged on 2 weeks of p.o. antibiotics.  If there

is concern for MRSA, I would recommend Keflex 500 mg 4 times a day with some

Bactrim double strength tablets twice a day.  He can follow up in my office

in 2 weeks for repeat clinical exam.  He can weightbear as tolerated.  Any

questions can be directed to me at [phone].

 

 

 

 

[facility]

## 2017-01-25 NOTE — NEPHROLOGY PROGRESS NOTE
Nephrology Progress Note


Date of Service


Jan 25, 2017.





Chief Complaint


Follow up evaluation of acute on chronic kidney injury





Subjective


Mr. Hummel was seen & examined in the PCU this morning.  He was admitted with a 

NSTEMI.  Cardiology has recommended medical management.  Patient currently 

denies angina.





The patient was admitted w/ left prepatellar bursitis.  He is on broad spectrum 

antibiotics.  He reports that his pain and swelling have improved.  He had 

follow up join aspiration performed 01/22 and 01/24.  Only a small amount of 

bloody fluid was obtained each time.  No purulent drainage.  Aspirates were not 

sent for culture.





Mr. Baron has phimosis.  He required a dorsal slit procedure this 

hospitalization.  He currently denies voiding difficulty.





Review of Systems


Constitutional:  No fever


Cardiovascular:  No chest pain


Respiratory:  No dyspnea at rest


Abdomen:  No nausea, No pain, No vomiting


Extremities:  No leg edema


A complete review of systems was performed.  Pertinent positives are noted 

above. All other systems are negative.





Vital Signs





Last 8 Hrs








  Date Time  Temp Pulse Resp B/P Pulse Ox O2 Delivery O2 Flow Rate FiO2


 


1/25/17 11:14 36.6 69 22 127/68 97 Room Air  


 


1/25/17 11:13  66 18  95 Room Air  


 


1/25/17 08:00      Room Air  


 


1/25/17 07:35 36.6 71 18 137/74 95 Room Air  


 


1/25/17 06:49  90 18  96 Room Air  


 


1/25/17 04:10 36.7 88 18 159/78 95 Room Air  


 


1/25/17 04:00      Room Air  











I & O











 24-Hour Column 


 


 1/25/17





 08:00


 


Intake Total 820 ml


 


Output Total 1375 ml


 


Balance -555 ml











Last Recorded Weight


Weight (Kilograms):  86.500





Physical Exam


General Appearance:  no apparent distress


Head:  normocephalic, atraumatic


Eyes:  PERRL, EOMI


Neck:  no adenopathy


Respiratory/Chest:  lungs clear


Cardiovascular:  regular rate, rhythm


Abdomen/GI:  normal bowel sounds, non tender, soft


Extremities/Musculoskelatal:  no pedal edema, + pertinent finding (Left 

prepatellar erythema and swelling is greatly improved)


Neurologic/Psych:  alert, oriented x 3





Family History





Heart disease





Social History


Smokeless Tobacco Use:  No


Alcohol Use:  none


Drug Use:  none


Marital Status:  


Housing Status:  lives with family


Occupation:  retired





Laboratory Results


Past 24 Hours


1/25/17 09:45








1/25/17 06:34

















Test


  1/24/17


16:20 1/24/17


20:16 1/25/17


06:34 1/25/17


09:45


 


Bedside Glucose


  186 mg/dl


(70-99) 229 mg/dl


(70-99) 


  


 


 


Anion Gap


  


  


  11.0 mmol/L


(3-11) 


 


 


Est Creatinine Clear Calc


Drug Dose 


  


  26.0 ml/min 


  


 


 


Estimated GFR (


American) 


  


  29.8 


  


 


 


Estimated GFR (Non-


American 


  


  25.7 


  


 


 


BUN/Creatinine Ratio   9.7 (10-20)  


 


Calcium Level


  


  


  8.2 mg/dl


(8.5-10.1) 


 


 


Red Blood Count


  


  


  


  2.78 M/uL


(4.7-6.1)


 


Mean Corpuscular Volume


  


  


  


  89.6 fL


()


 


Mean Corpuscular Hemoglobin


  


  


  


  29.1 pg


(25-34)


 


Mean Corpuscular Hemoglobin


Concent 


  


  


  32.5 g/dl


(32-36)


 


RDW Standard Deviation


  


  


  


  64.5 fL


(36.4-46.3)


 


RDW Coefficient of Variation


  


  


  


  19.6 %


(11.5-14.5)


 


Platelet Estimate    DECREASED 














Test


  1/25/17


11:20 


  


  


 


 


Bedside Glucose


  220 mg/dl


(70-99) 


  


  


 











Allergies


Coded Allergies:  


     No Known Allergies (Verified , 1/20/17)





Medications





 Current Inpatient Medications








 Medications


  (Trade)  Dose


 Ordered  Sig/Mariposa


 Route  Start Time


 Stop Time Status Last Admin


Dose Admin


 


 Atorvastatin


 Calcium


  (Lipitor Tab)  40 mg  DAILY


 PO  1/21/17 09:00


 2/20/17 08:59  1/25/17 08:12


40 MG


 


 Cholecalciferol


  (Vitamin D Tab)  1,000


 inter.unit  DAILY


 PO  1/21/17 09:00


 2/20/17 08:59  1/25/17 08:14


1,000 INTER.UNIT


 


 Clopidogrel


 Bisulfate


  (plAVix TAB)  75 mg  DAILY


 PO  1/21/17 09:00


 2/20/17 08:59  1/23/17 08:43


75 MG


 


 Cyanocobalamin


  (Vitamin B-12


 Tab)  1,000 mcg  DAILY


 PO  1/21/17 09:00


 2/20/17 08:59  1/25/17 08:13


1,000 MCG


 


 Meclizine HCl


  (Antivert Tab)  25 mg  Q6


 PO  1/20/17 18:00


 2/19/17 17:59  1/25/17 06:15


25 MG


 


 Acetaminophen


  (Tylenol Tab)  650 mg  Q4H  PRN


 PO  1/20/17 15:15


 2/19/17 15:14  1/23/17 21:35


650 MG


 


 Zolpidem Tartrate


  (Ambien Tab)  5 mg  HSZ  PRN


 PO  1/20/17 15:15


 2/19/17 15:14   


 


 


 Nitroglycerin


  (Nitrostat Tab)  0.4 mg  UD  PRN


 SL  1/20/17 15:15


 2/19/17 15:14  1/22/17 08:32


0.4 MG


 


 Lorazepam


  (Ativan Inj)  0.5 mg  Q4H  PRN


 IV  1/20/17 15:15


 2/19/17 15:14   


 


 


 Magnesium


 Hydroxide


  (Milk Of


 Magnesia Susp)  30 ml  Q6H  PRN


 PO  1/20/17 15:15


 2/19/17 15:14   


 


 


 Bisacodyl


  (Dulcolax Supp)  10 mg  DAILY  PRN


 WV  1/20/17 15:15


 2/19/17 15:14   


 


 


 Diphenhydramine


 HCl


  (Benadryl Inj)  25 mg  Q4H  PRN


 IV  1/20/17 15:15


 2/19/17 15:14   


 


 


 Al Hydrox/Mg


 Hydrox/


 Simethicone 15 ml  15 ml  Q4H  PRN


 PO  1/20/17 15:15


 2/19/17 15:14   


 


 


 Promethazine HCl/


 Sodium Chloride


  (Phenergan Inj/


 Nss 50ml)  50.5 ml @ 


 202 mls/hr  Q4H  PRN


 IV  1/20/17 15:15


 2/19/17 15:14   


 


 


 Ondansetron HCl


  (Zofran Inj)  4 mg  Q6H  PRN


 IV  1/20/17 15:15


 2/19/17 15:14   


 


 


 Docusate Sodium


  (coLACE CAP)  100 mg  BID


 PO  1/20/17 21:00


 2/19/17 20:59  1/25/17 08:13


100 MG


 


 Morphine Sulfate


  (MoRPHine


 SULFATE INJ)  2 mg  Q2H  PRN


 IV  1/20/17 15:15


 2/3/17 15:14  1/22/17 09:25


2 MG


 


 Insulin Aspart


  (novoLOG ASPART)  **SLIDING


 SCALE**


 **If C...  ACHS


 SC  1/20/17 16:00


 2/19/17 15:59  1/25/17 08:21


9 UNITS


 


 Glucose


  (Glucose 40% Gel)    UD  PRN


 PO  1/20/17 15:15


 2/19/17 15:14   


 


 


 Glucose


  (Glucose Chew


 Tab)  1 tabs  UD  PRN


 PO  1/20/17 15:15


 2/19/17 15:14   


 


 


 Dextrose


  (Dextrose 50%


 50ML Syringe)  50 ml  UD  PRN


 IV  1/20/17 15:15


 2/19/17 15:14   


 


 


 Glucagon


  (Glucagon Inj)  1 mg  UD  PRN


 SQ  1/20/17 15:15


 2/19/17 15:14   


 


 


 Insulin Glargine


  (Lantus Solostar


 Pen)  20 unit  QAM


 SQ  1/21/17 09:00


 2/20/17 08:59  1/25/17 08:22


20 UNIT


 


 Guaifenesin


  (Organidin Nr


 Tab)  600 mg  BID


 PO  1/20/17 21:00


 2/19/17 20:59  1/25/17 08:15


600 MG


 


 Budesonide


  (Pulmicort


 Respules 0.5MG/


 2ML Neb Soln)  0.5 mg  BIDR


 INH  1/20/17 20:00


 2/19/17 19:59  1/24/17 19:48


0.5 MG


 


 Bacitracin


  (Bacitracin Oint)  1 appln  BID


 EXT  1/21/17 21:00


 1/26/17 09:01  1/25/17 08:17


1 APPLN


 


 Ipratropium


 Bromide


  (Atrovent 0.02%


 0.5MG/2.5ML Neb)  0.5 mg  Q4R


 INH  1/21/17 16:00


 2/19/17 20:59  1/25/17 11:13


0.5 MG


 


 Levalbuterol


  (Xopenex 1.25MG/


 0.5ML Neb)  1.25 mg  Q4R


 INH  1/21/17 16:00


 2/19/17 20:59  1/25/17 11:13


1.25 MG


 


 Oxycodone HCl


  (Roxicodone


 Immediate Rel Tab)  5 mg  Q4  PRN


 PO  1/21/17 14:45


 2/4/17 14:44  1/21/17 15:28


5 MG


 


 Magnesium Oxide


  (Mag-Ox Tab)  400 mg  QAM


 PO  1/23/17 09:00


 2/22/17 08:59  1/25/17 08:14


400 MG


 


 Aspirin


  (Ecotrin Tab)  81 mg  QAM


 PO  1/22/17 11:00


 2/21/17 10:59  1/23/17 08:43


81 MG


 


 Pantoprazole


 Sodium


  (Protonix Tab)  40 mg  QAM


 PO  1/24/17 09:00


 2/23/17 08:59  1/25/17 08:14


40 MG


 


 Isosorbide


 Mononitrate


  (Imdur Ext Rel


 Tab)  30 mg  QAM


 PO  1/24/17 09:00


 2/23/17 08:59  1/25/17 08:13


30 MG


 


 Heparin Sodium


  (Porcine)


  (Heparin Sq 5000


 Unit/0.5ml)  5,000 unit  Q12


 SQ  1/24/17 21:00


 2/23/17 20:59   


 











Impression





(1) CKD (chronic kidney disease), stage IV


(2) Acute kidney injury superimposed on chronic kidney disease


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


(4) ACS (acute coronary syndrome)


Mr. Hummel is an 81-year-old male w/ ASCVD, DM, HTN, dyslipidemia, MGUS with 

chronic anemia / thrombocytopenia and stage IV CKD.   Baseline creatinine has 

been 2.0 - 2.2.  He has stated that he would not consider hemodialysis during 

prior discussions.  He has an extensive history of cardiovascular disease.  He 

presented to the ED with hypotension, hypoglycemia, generalized weakness, 

shortness of breath.  He has been struggling recently with right knee 

prepatellar bursitis.  He was admitted to rule-out ACS with hyperglycemia and 

some evidence of acute on chronic renal insufficiency in addition to possible 

sepsis.





Recommendations


ACUTE KIDNEY INJURY:


-- Resolved. 


-- Volume status and electrolyte balance remain acceptable at this time





CHRONIC KIDNEY DISEASE:


-- Baseline creatinine 2.0 - 2.2


-- Mr. Hummel has expressed multiple times in the past that he would not 

consider dialysis under any circumstances.  





ASCVD:


-- CPK normal.  Troponin is trending down


-- Medical management as per cardiology.  Patient is on ASA, plavix and 

nitrate.  Beta blocker held due to h/o symptomatic bradycardia





ANEMIA:


-- H/o MGUS and ASCVD


-- Recommend blood transfusion to maintain Hgb > or = 8.0





ID:


-- L prepatellar bursitis.  Patient is on empiric IV Zosyn.  Area of erythema 

appears to be much less.  Patient is clinically improved 





BPH & PHIMOSIS:


-- Dorsal slit procedure performed 01/22/17 by urology





OTHER:


-- No further nephrology evaluation needed at this time.  Will sign off.  

Please call if further assistance is needed.  Patient does already have an 

appointment scheduled to follow up as with Dr. Yonis Gifford [facility] within 

the next 2 - 3 weeks.

## 2017-01-26 VITALS
HEART RATE: 80 BPM | DIASTOLIC BLOOD PRESSURE: 67 MMHG | SYSTOLIC BLOOD PRESSURE: 120 MMHG | TEMPERATURE: 98.24 F | OXYGEN SATURATION: 94 %

## 2017-01-26 VITALS
HEART RATE: 76 BPM | SYSTOLIC BLOOD PRESSURE: 129 MMHG | TEMPERATURE: 97.88 F | DIASTOLIC BLOOD PRESSURE: 69 MMHG | OXYGEN SATURATION: 92 %

## 2017-01-26 VITALS
HEART RATE: 69 BPM | DIASTOLIC BLOOD PRESSURE: 71 MMHG | OXYGEN SATURATION: 92 % | TEMPERATURE: 98.6 F | SYSTOLIC BLOOD PRESSURE: 151 MMHG

## 2017-01-26 VITALS
OXYGEN SATURATION: 95 % | SYSTOLIC BLOOD PRESSURE: 145 MMHG | TEMPERATURE: 98.24 F | HEART RATE: 72 BPM | DIASTOLIC BLOOD PRESSURE: 70 MMHG

## 2017-01-26 VITALS
OXYGEN SATURATION: 95 % | TEMPERATURE: 98.24 F | HEART RATE: 68 BPM | SYSTOLIC BLOOD PRESSURE: 156 MMHG | DIASTOLIC BLOOD PRESSURE: 63 MMHG

## 2017-01-26 VITALS
DIASTOLIC BLOOD PRESSURE: 74 MMHG | HEART RATE: 76 BPM | TEMPERATURE: 97.88 F | OXYGEN SATURATION: 97 % | SYSTOLIC BLOOD PRESSURE: 151 MMHG

## 2017-01-26 VITALS
DIASTOLIC BLOOD PRESSURE: 76 MMHG | TEMPERATURE: 98.24 F | OXYGEN SATURATION: 94 % | SYSTOLIC BLOOD PRESSURE: 132 MMHG | HEART RATE: 77 BPM

## 2017-01-26 VITALS — HEART RATE: 80 BPM | OXYGEN SATURATION: 94 %

## 2017-01-26 VITALS — HEART RATE: 78 BPM | OXYGEN SATURATION: 93 %

## 2017-01-26 VITALS — HEART RATE: 70 BPM | OXYGEN SATURATION: 94 %

## 2017-01-26 VITALS — OXYGEN SATURATION: 96 % | HEART RATE: 76 BPM

## 2017-01-26 VITALS
SYSTOLIC BLOOD PRESSURE: 134 MMHG | TEMPERATURE: 98.42 F | HEART RATE: 68 BPM | OXYGEN SATURATION: 97 % | DIASTOLIC BLOOD PRESSURE: 76 MMHG

## 2017-01-26 VITALS
TEMPERATURE: 98.24 F | HEART RATE: 85 BPM | OXYGEN SATURATION: 97 % | DIASTOLIC BLOOD PRESSURE: 76 MMHG | SYSTOLIC BLOOD PRESSURE: 187 MMHG

## 2017-01-26 VITALS
DIASTOLIC BLOOD PRESSURE: 76 MMHG | SYSTOLIC BLOOD PRESSURE: 144 MMHG | OXYGEN SATURATION: 94 % | HEART RATE: 72 BPM | TEMPERATURE: 98.42 F

## 2017-01-26 VITALS
HEART RATE: 83 BPM | TEMPERATURE: 97.7 F | DIASTOLIC BLOOD PRESSURE: 76 MMHG | SYSTOLIC BLOOD PRESSURE: 191 MMHG | OXYGEN SATURATION: 94 %

## 2017-01-26 LAB
ANION GAP SERPL CALC-SCNC: 10 MMOL/L (ref 3–11)
BUN SERPL-MCNC: 23 MG/DL (ref 7–18)
BUN/CREAT SERPL: 11.1 (ref 10–20)
CALCIUM SERPL-MCNC: 8.1 MG/DL (ref 8.5–10.1)
CHLORIDE SERPL-SCNC: 109 MMOL/L (ref 98–107)
CO2 SERPL-SCNC: 22 MMOL/L (ref 21–32)
CREAT CL PREDICTED SERPL C-G-VRATE: 28.5 ML/MIN
CREAT SERPL-MCNC: 2.1 MG/DL (ref 0.6–1.4)
EOSINOPHIL NFR BLD AUTO: 100 K/UL (ref 130–400)
GLUCOSE SERPL-MCNC: 144 MG/DL (ref 70–99)
HCT VFR BLD CALC: 22.8 % (ref 42–52)
HCT VFR BLD CALC: 26.3 % (ref 42–52)
MCH RBC QN AUTO: 29.4 PG (ref 25–34)
MCHC RBC AUTO-ENTMCNC: 32.5 G/DL (ref 32–36)
MCV RBC AUTO: 90.5 FL (ref 80–100)
PLATELET # BLD EST: (no result) 10*3/UL
PMV BLD AUTO: 12.1 FL (ref 7.4–10.4)
POTASSIUM SERPL-SCNC: 4.2 MMOL/L (ref 3.5–5.1)
RBC # BLD AUTO: 2.52 M/UL (ref 4.7–6.1)
SODIUM SERPL-SCNC: 141 MMOL/L (ref 136–145)
WBC # BLD AUTO: 6.71 K/UL (ref 4.8–10.8)

## 2017-01-26 RX ADMIN — BACITRACIN SCH APPLN: 500 OINTMENT TOPICAL at 09:09

## 2017-01-26 RX ADMIN — ATORVASTATIN CALCIUM SCH MG: 40 TABLET, FILM COATED ORAL at 09:08

## 2017-01-26 RX ADMIN — INSULIN ASPART SCH UNITS: 100 INJECTION, SOLUTION INTRAVENOUS; SUBCUTANEOUS at 09:13

## 2017-01-26 RX ADMIN — MECLIZINE HYDROCHLORIDE SCH MG: 25 TABLET ORAL at 17:35

## 2017-01-26 RX ADMIN — Medication SCH MG: at 09:08

## 2017-01-26 RX ADMIN — IPRATROPIUM BROMIDE SCH MG: 0.5 SOLUTION RESPIRATORY (INHALATION) at 03:25

## 2017-01-26 RX ADMIN — PANTOPRAZOLE SCH MG: 40 TABLET, DELAYED RELEASE ORAL at 09:08

## 2017-01-26 RX ADMIN — INSULIN GLARGINE SCH UNIT: 100 INJECTION, SOLUTION SUBCUTANEOUS at 09:12

## 2017-01-26 RX ADMIN — MECLIZINE HYDROCHLORIDE SCH MG: 25 TABLET ORAL at 00:28

## 2017-01-26 RX ADMIN — IPRATROPIUM BROMIDE SCH MG: 0.5 SOLUTION RESPIRATORY (INHALATION) at 15:27

## 2017-01-26 RX ADMIN — INSULIN ASPART SCH UNITS: 100 INJECTION, SOLUTION INTRAVENOUS; SUBCUTANEOUS at 17:39

## 2017-01-26 RX ADMIN — CEPHALEXIN SCH MG: 250 CAPSULE ORAL at 10:00

## 2017-01-26 RX ADMIN — MECLIZINE HYDROCHLORIDE SCH MG: 25 TABLET ORAL at 12:00

## 2017-01-26 RX ADMIN — HEPARIN SODIUM SCH UNIT: 10000 INJECTION, SOLUTION INTRAVENOUS; SUBCUTANEOUS at 09:11

## 2017-01-26 RX ADMIN — IPRATROPIUM BROMIDE SCH MG: 0.5 SOLUTION RESPIRATORY (INHALATION) at 11:24

## 2017-01-26 RX ADMIN — LEVALBUTEROL SCH MG: 1.25 SOLUTION, CONCENTRATE RESPIRATORY (INHALATION) at 11:24

## 2017-01-26 RX ADMIN — CLOPIDOGREL BISULFATE SCH MG: 75 TABLET, FILM COATED ORAL at 09:08

## 2017-01-26 RX ADMIN — ISOSORBIDE MONONITRATE SCH MG: 30 TABLET, EXTENDED RELEASE ORAL at 09:08

## 2017-01-26 RX ADMIN — CEPHALEXIN SCH MG: 250 CAPSULE ORAL at 17:36

## 2017-01-26 RX ADMIN — IPRATROPIUM BROMIDE SCH MG: 0.5 SOLUTION RESPIRATORY (INHALATION) at 07:13

## 2017-01-26 RX ADMIN — Medication SCH MCG: at 09:08

## 2017-01-26 RX ADMIN — LEVALBUTEROL SCH MG: 1.25 SOLUTION, CONCENTRATE RESPIRATORY (INHALATION) at 07:13

## 2017-01-26 RX ADMIN — BUDESONIDE SCH MG: 0.5 SUSPENSION RESPIRATORY (INHALATION) at 07:13

## 2017-01-26 RX ADMIN — LEVALBUTEROL SCH MG: 1.25 SOLUTION, CONCENTRATE RESPIRATORY (INHALATION) at 03:25

## 2017-01-26 RX ADMIN — MECLIZINE HYDROCHLORIDE SCH MG: 25 TABLET ORAL at 05:36

## 2017-01-26 RX ADMIN — INSULIN ASPART SCH UNITS: 100 INJECTION, SOLUTION INTRAVENOUS; SUBCUTANEOUS at 11:00

## 2017-01-26 RX ADMIN — Medication SCH INTER.UNIT: at 09:08

## 2017-01-26 RX ADMIN — DOCUSATE SODIUM SCH MG: 100 CAPSULE, LIQUID FILLED ORAL at 09:08

## 2017-01-26 RX ADMIN — GUAIFENESIN SCH MG: 200 TABLET ORAL at 09:09

## 2017-01-26 RX ADMIN — LEVALBUTEROL SCH MG: 1.25 SOLUTION, CONCENTRATE RESPIRATORY (INHALATION) at 15:27

## 2017-01-26 NOTE — PROGRESS NOTE
Subjective


Date of Service:


Jan 26, 2017.


Subjective


Pt evaluation today including:  conversation w/ patient, chart review, lab 

review


Voiding:  no voiding problems


80 yo male s/p dorsal slit. 


Suture placed by Dr. Swift yesterday, and bleeding has stopped since that 

time. 


Pt denies pain or difficulty voiding this morning. 


Cr has improved to 2.10.





Problem List


Medical Problems:


(1) Cardiac ischemia


Status: Acute  





(2) Chest pain


Status: Acute  





(3) Dyspnea on exertion


Status: Acute  





(4) Renal insufficiency


Status: Acute  





(5) Sepsis


Status: Acute  





(6) Shortness of breath


Status: Acute  





(7) SOB (shortness of breath)


Status: Acute  





(8) Symptomatic anemia


Status: Acute  





(9) Upper GI bleeding


Status: Acute  





(10) Vertigo


Status: Acute  





(11) Weakness


Status: Acute  











Review of Systems


Constitutional:  No chills, No fever


Respiratory:  + cough, + shortness of breath (chronic per pt; nebulizer in 

progress), + sputum


Cardiac:  No chest pain


Abdomen:  No nausea, No pain, No vomiting


Male :  No dysuria, No hematuria


Heme:  No abnormal bleeding/bruising





Objective


Vital Signs











  Date Time  Temp Pulse Resp B/P Pulse Ox O2 Delivery O2 Flow Rate FiO2


 


1/26/17 07:09 36.5 83 18 191/76 94 Room Air  


 


1/26/17 04:00      Room Air  


 


1/26/17 03:51 36.6 76 17 129/69 92 Room Air  


 


1/26/17 03:25  78 18  93 Room Air  


 


1/25/17 23:59      Room Air  


 


1/25/17 23:16  91 18  94 Room Air  


 


1/25/17 23:06 36.7 84 17 166/83 95 Room Air  


 


1/25/17 20:00     96 Room Air 2.0 


 


1/25/17 19:08 37.0 65 20 176/80 96 Room Air  


 


1/25/17 19:05  65 18  96 Room Air  


 


1/25/17 16:00     94 Room Air  


 


1/25/17 15:26  63 18  94 Room Air  


 


1/25/17 15:23 36.5 62 18 136/69 94 Room Air  


 


1/25/17 12:00      Room Air  


 


1/25/17 11:14 36.6 69 22 127/68 97 Room Air  


 


1/25/17 11:13  66 18  95 Room Air  


 


1/25/17 08:00      Room Air  











Physical Exam


General Appearance:  no apparent distress


Eyes:  normal inspection


ENT:  hearing grossly normal


Neck:  no JVD


Respiratory/Chest:  no respiratory distress, no accessory muscle use


Cardiovascular:  no JVD


Extremities:  normal inspection


Neurologic/Psychiatric:  alert, normal mood/affect, oriented x 3


Skin:  normal color





Laboratory Results





Last 24 Hours








Test


  1/25/17


09:45 1/25/17


11:20 1/25/17


16:04 1/25/17


20:01


 


White Blood Count 7.52 K/uL    


 


Red Blood Count 2.78 M/uL    


 


Hemoglobin 8.1 g/dL    


 


Hematocrit 24.9 %    


 


Mean Corpuscular Volume 89.6 fL    


 


Mean Corpuscular Hemoglobin 29.1 pg    


 


Mean Corpuscular Hemoglobin


Concent 32.5 g/dl 


  


  


  


 


 


RDW Standard Deviation 64.5 fL    


 


RDW Coefficient of Variation 19.6 %    


 


Platelet Count 95 K/uL    


 


Platelet Estimate DECREASED    


 


Bedside Glucose  220 mg/dl  173 mg/dl  154 mg/dl 














Test


  1/26/17


06:35 1/26/17


06:39 


  


 


 


Sodium Level 141 mmol/L    


 


Potassium Level 4.2 mmol/L    


 


Chloride Level 109 mmol/L    


 


Carbon Dioxide Level 22 mmol/L    


 


Anion Gap 10.0 mmol/L    


 


Blood Urea Nitrogen 23 mg/dl    


 


Creatinine 2.10 mg/dl    


 


Est Creatinine Clear Calc


Drug Dose 28.5 ml/min 


  


  


  


 


 


Estimated GFR (


American) 33.2 


  


  


  


 


 


Estimated GFR (Non-


American 28.7 


  


  


  


 


 


BUN/Creatinine Ratio 11.1    


 


Random Glucose 144 mg/dl    


 


Calcium Level 8.1 mg/dl    


 


Bedside Glucose  151 mg/dl   











Assessment and Plan


S/p dorsal slit. 





AFVSS. 


Incisional bleeding improved this morning. H&H pending. 


Will have nursing cleanse the wound twice daily with soap and water and apply 

Bacitracin ointment and sterile 4x4s to the area.


Pt OK for [facility]/c home from  [facility]. Will arrange for outpatient f/u in 2 

weeks. No further  management at this time. Recall PRN  issues. Thanks for 

allowing us to participate in this pt's care.

## 2017-01-26 NOTE — DISCHARGE INSTRUCTIONS
Discharge Instructions


Admission


Reason for Admission:  Nstemi,Initial Episode Of Care





Discharge


Discharge Diagnosis / Problem:  Chest pain, Demand ischemia, Phimosis, Acute on 

chronic kidney disease





Discharge Goals


Goal(s):  Decrease discomfort, Improve function, Improve disease control





Activity Recommendations


Activity Limitations:  resume your previous activity





.





Instructions / Follow-Up


Instructions / Follow-Up


Nephrology - follow up with Dr. Yonis Gifford D.O. - as is already scheduled





Urology - follow up with Dr. Marcos Swift M.D. and/or ONOFRE Green 

in 2 weeks. Call office for appointment [phone]





    Wound care: Use soap and water twice a day.  Dry the area gently and place 

Bacitracin ointment on area and cover with sterile 4x4.





Orthopedics - follow up with Dr. Papo Gracia M.D. in 2 weeks. Call office for 

appointment [phone]





Cardiology - follow up with Dr. Jf Sharma M.D. or Dr. Shaheen Sanchez M.D. 

for hospital follow up.  Call office for appointment [phone]





Current Hospital Diet


Patient's current hospital diet: Diabetes Type 2 Diet, AHA Diet (Heart Healthy)





Discharge Diet


Recommended Diet:  AHA Diet (Heart Healthy), Diabetes Type 2 Diet





Procedures


Procedures Performed:  


Dorsal slit due to phimosis on 1/21/17





Aspiration of prepattellar bursa on 1/22 and 1/24/17





You received 1 unit of blood due to Hgb of 7.5.  After the transfusion


your Hgb was 8.8.





Pending Studies


Studies pending at discharge:  no





Laboratory Results





Last 24 Hours








Test


  1/25/17


20:01 1/26/17


06:35 1/26/17


06:39 1/26/17


10:49


 


Bedside Glucose 154 mg/dl   151 mg/dl  163 mg/dl 


 


White Blood Count  6.71 K/uL   


 


Red Blood Count  2.52 M/uL   


 


Hemoglobin  7.4 g/dL   


 


Hematocrit  22.8 %   


 


Mean Corpuscular Volume  90.5 fL   


 


Mean Corpuscular Hemoglobin  29.4 pg   


 


Mean Corpuscular Hemoglobin


Concent 


  32.5 g/dl 


  


  


 


 


RDW Standard Deviation  64.5 fL   


 


RDW Coefficient of Variation  19.4 %   


 


Platelet Count  100 K/uL   


 


Mean Platelet Volume  12.1 fL   


 


Platelet Estimate  DECREASED   


 


Sodium Level  141 mmol/L   


 


Potassium Level  4.2 mmol/L   


 


Chloride Level  109 mmol/L   


 


Carbon Dioxide Level  22 mmol/L   


 


Anion Gap  10.0 mmol/L   


 


Blood Urea Nitrogen  23 mg/dl   


 


Creatinine  2.10 mg/dl   


 


Est Creatinine Clear Calc


Drug Dose 


  28.5 ml/min 


  


  


 


 


Estimated GFR (


American) 


  33.2 


  


  


 


 


Estimated GFR (Non-


American 


  28.7 


  


  


 


 


BUN/Creatinine Ratio  11.1   


 


Random Glucose  144 mg/dl   


 


Calcium Level  8.1 mg/dl   














Test


  1/26/17


16:06 1/26/17


17:20 


  


 


 


Bedside Glucose 92 mg/dl    


 


Hemoglobin  8.8 g/dL   


 


Hematocrit  26.3 %   











Medical Emergencies








.


Who to Call and When:





Medical Emergencies:  If at any time you feel your situation is an emergency, 

please call 911 immediately.





.





Non-Emergent Contact


Non-Emergency issues call your:  Primary Care Provider





.


.





"Provider Documentation" section prepared by Jai Ken.





VTE Core Measure


Inpt VTE Proph given/why not?:  Unfractionated heparin SQ, SCD's

## 2017-01-26 NOTE — DISCHARGE SUMMARY
Discharge Summary


Admission Date:


Jan 20, 2017 at 15:20


Discharge Date:  Jan 26, 2017


Discharge Disposition:  Home with services


Principal Diagnosis:  SIRS


Problems/Secondary Diagnoses:


Elevated troponins felt to be demand ischemia


Phimosis


Acute on Chronic Kidney Disease


Anemia - multifactorial


Hemorrhagic prepatellar bursitis with local cellulitis Left.


BPH


CAD


Immunizations:  


   Have You Had Influenza Vaccine:  Yes


   History of Tetanus Vaccine?:  Yes


   History of Pneumococcal:  Yes


   History of Hepatitis B Vaccine:  No


Procedures:


Dorsal slit 1/21/17





Aspiration of Left prepatellar bursa 1/22 & 1/24/17


Consultations:


Nephrology,  Dr. Yonis Gifford D.O.





Urology,  Dr. Marcos Swift M.D. & ONOFRE Green





Ortho, Dr. Papo Gracia M.D.





Cardiology,  Dr. Shaheen Sanchez M.D. & Dr. Jf Sharma M.D.


Medication Reconciliation


New Medications:  


Aspirin (Aspirin EC Low Dose) 81 Mg Ectab


81 MG PO DAILY for 30 Days, #30 0 Refills NS





Cephalexin Monohydrate (Keflex) 500 Mg Cap


500 MG PO QID for 14 Days, #56 CAP NS





 


Continued Medications:  


Amlodipine (Norvasc) 10 Mg Tab


10 MG PO DAILY, TAB





Atorvastatin (Lipitor) 40 Mg Tab


40 MG PO DAILY, TAB





Cholecalciferol (Vitamin D3) 1,000 Unit Tab


1 TAB PO DAILY, TAB 3 Refills





Clopidogrel (Plavix) 75 Mg Tab


75 MG PO DAILY, TAB





Cyanocobalamin (Vitamin B-12 1000 Mcg) 1,000 Mcg Tab


1000 MCG PO DAILY, TAB





Furosemide (Furosemide) 20 Mg Tab


20 MG PO DAILY PRN for Fluid Retention





Insulin Glargine (Lantus Solostar) 100 Unit/Ml Inj


18-20 UNITS SQ QAM





Insulin Lispro (Human) (Humalog Kwikpen) 100 Unit/Ml Inj


8 UNITS SC TIDM


ADJUST AS DIRECTED BY SLIDING SCALE.


Isosorbide Mononitrate Ext Rel (Imdur Ext Rel) 120 Mg Ertab


120 MG PO QAM, TAB





Meclizine HCl (Meclizine HCl) 25 Mg Tab


25 MG PO Q6H, #30 TAB





Nitroglycerin (Nitrostat) 0.4 Mg Tab


0.4 MG SL UD PRN for Chest Pain


PLACE ONE TABLET UNDER THE TONGUE EVERY 5 MINUTES FOR UP TO 3 DOSES


 IF NEEDED FOR CHEST PAIN.


Pantoprazole (Protonix) 40 Mg Tab


40 MG PO DAILY, #30 TAB





 


Discontinued Medications:  


Doxycycline Hyclate (Vibramycin) 100 Mg Cap


100 MG PO Q12H, #14











Discharge Exam


A 10 system review was performed and all were negative.  Positives were placed 

in the subjective section. 








GEN: Awake, alert, and oriented x 3. Not in acute distress


HEENT: Tm's intact, no inflammation, EOMI, PERRLA, MMM


Neck: Soft, supple


Lungs: trace expiratory wheeze b/l.


Heart: REG, nrl S1S2 without murmurs, rubs or gallops


Abdomen: Soft, NT, ND, + BS


EXT: No C/C/E


NEURO: CN's II-XII grossly intact, non-focal


Skin: warm, dry, no rashes


PSYCH: pleasant, cooperative, no signs of significant anxiety or depression.





Hospital Course


Patient was initially admitted with SIRS criteria with possible infectious 

sites to be lung and Left knee.  He was placed on vancomycin IV per pharmacy 

dosing, Zosyn 3.375 mg IV every 8 hours,  levofloxacin 500 mg IV every 24 hours

, Xopenex with Atrovent nebs.  Lactic acid was elevated at 4.88, WBC is 

elevated at 15.20.


His troponin was elevated and cardiology evaluated and followed the patient.  

It was felt that the elevation was due to demand ischemia and that he did NOT 

have an MI this admission.





The patient described difficulty with urination and was found to have a 

phimosis.  Urology was consulted and performed a dorsal slit.  





Swelling over the left prepatellar bursa led to orthopedic evaluation and the 

patient underwent aspiration on 2 occasions. The bursal fluid was bloody, but 

did not appear to be infected. None-the-less the patient was on broad 

antibiotic coverage for possibility of lung infection.  At discharge the 

patient was sent with Keflex 500mg QID for the next 14 days as advised by 

ortho. 





The combination of antiplatelet therapy and heparin gtt for cardiac issues lead 

to oozing of blood from the dorsal slit incision. The day prior to discharge, 

urology sutured the site and there was no further bleeding.  





The patients Hgb came down to 7.5.  He was given 1 unit of PRBCs and the post 

transfusion Hgb was 8.8.





Physical therapy recommend to continue home physical therapy.


Total Time Spent:  Greater than 30 minutes


This includes examination of the patient, discharge planning, medication 

reconciliation, and communication with other providers.





Discharge Instructions


Please refer to the electronic Patient Visit Report (Discharge Instructions) 

for additional information.





Follow-Up


See the patient discharge record for follow up instructions.

## 2017-01-29 ENCOUNTER — HOSPITAL ENCOUNTER (EMERGENCY)
Dept: HOSPITAL 45 - C.EDA | Age: 82
Discharge: HOME | End: 2017-01-29
Payer: COMMERCIAL

## 2017-01-29 VITALS — TEMPERATURE: 97.52 F

## 2017-01-29 VITALS
WEIGHT: 199.52 LBS | BODY MASS INDEX: 28.56 KG/M2 | HEIGHT: 70 IN | HEIGHT: 70 IN | WEIGHT: 199.52 LBS | BODY MASS INDEX: 28.56 KG/M2

## 2017-01-29 VITALS — OXYGEN SATURATION: 94 % | HEART RATE: 64 BPM | SYSTOLIC BLOOD PRESSURE: 140 MMHG | DIASTOLIC BLOOD PRESSURE: 55 MMHG

## 2017-01-29 DIAGNOSIS — E16.2: Primary | ICD-10-CM

## 2017-01-29 DIAGNOSIS — D64.9: ICD-10-CM

## 2017-01-29 DIAGNOSIS — R51: ICD-10-CM

## 2017-01-29 DIAGNOSIS — Z82.49: ICD-10-CM

## 2017-01-29 DIAGNOSIS — N18.6: ICD-10-CM

## 2017-01-29 LAB
ANION GAP SERPL CALC-SCNC: 12 MMOL/L (ref 3–11)
ANISOCYTOSIS BLD QL SMEAR: PRESENT
BASOPHILS # BLD: 0.03 K/UL (ref 0–0.2)
BASOPHILS NFR BLD: 0.2 %
BUN SERPL-MCNC: 27 MG/DL (ref 7–18)
BUN/CREAT SERPL: 11.1 (ref 10–20)
CALCIUM SERPL-MCNC: 8.4 MG/DL (ref 8.5–10.1)
CHLORIDE SERPL-SCNC: 107 MMOL/L (ref 98–107)
CO2 SERPL-SCNC: 22 MMOL/L (ref 21–32)
COMPLETE: YES
CREAT CL PREDICTED SERPL C-G-VRATE: 27.3 ML/MIN
CREAT SERPL-MCNC: 2.4 MG/DL (ref 0.6–1.4)
EOSINOPHIL NFR BLD AUTO: 260 K/UL (ref 130–400)
GLUCOSE SERPL-MCNC: 94 MG/DL (ref 70–99)
HCT VFR BLD CALC: 25.1 % (ref 42–52)
IG%: 5.4 %
IMM GRANULOCYTES NFR BLD AUTO: 21.2 %
LYMPHOCYTES # BLD: 2.59 K/UL (ref 1.2–3.4)
MCH RBC QN AUTO: 29.6 PG (ref 25–34)
MCHC RBC AUTO-ENTMCNC: 32.3 G/DL (ref 32–36)
MCV RBC AUTO: 91.6 FL (ref 80–100)
MONOCYTES NFR BLD: 22.9 %
NEUTROPHILS # BLD AUTO: 0.2 %
NEUTROPHILS NFR BLD AUTO: 50.1 %
PMV BLD AUTO: 11.4 FL (ref 7.4–10.4)
POTASSIUM SERPL-SCNC: 4.2 MMOL/L (ref 3.5–5.1)
RBC # BLD AUTO: 2.74 M/UL (ref 4.7–6.1)
SODIUM SERPL-SCNC: 141 MMOL/L (ref 136–145)
WBC # BLD AUTO: 12.21 K/UL (ref 4.8–10.8)

## 2017-01-29 NOTE — EMERGENCY ROOM VISIT NOTE
History


Report prepared by Carol:  Fariba Field


Under the Supervision of:  Dr. Yonis Cordova D.O.


First contact with patient:  16:16


Chief Complaint:  HYPOGLYCEMIA


Stated Complaint:  HYPOGLYCEMIA


Nursing Triage Summary:  


pt arrived via ALS states "I fell asleep while frying chicken", wife was frying 


chicken; states he checked his sugar, "it was 160 something""took 8 units 


humalog"; BSG on EMS arrival 60, gave D10, repeat ; pt A&O on arrival.





History of Present Illness


The patient is a 81 year old male who presents to the Emergency Room with 

complaints of constant hypoglycemia occurring just prior to arrival. The 

patient ate breakfast this morning and has since not eaten. He checked his 

blood sugar levels and it was 167. He took 8 units of Humalog. When EMS arrived 

his blood sugar level was 60 and he was given D10. A repeat blood sugar level 

was 102. He notes that he has an headache. The patient has an appointment with 

his doctor this coming week to follow up about this recent episode of 

hypoglycemia. He was been hospitalized before for hyperglycemia. The patient 

denies any other symptoms at this time.





   Source of History:  patient


   Onset:  just PTA


   Position:  other (global)


   Quality:  other (hypogylcemia)


   Timing:  constant


   Associated Symptoms:  + headache


Note:


The patient denies any other symptoms at this time.





Review of Systems


See HPI for pertinent positives & negatives. A total of 10 systems reviewed and 

were otherwise negative.





Past Medical & Surgical


Medical Problems:


(1) ACS (acute coronary syndrome)


(2) Acute kidney injury superimposed on chronic kidney disease


(3) Anemia


(4) Angina at rest


(5) Bradycardia


(6) CKD (chronic kidney disease), stage IV


(7) End stage renal disease


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


(9) NSTEMI, initial episode of care


(10) Precordial chest pain


(11) Thrombocytopenia


Surgical Problems:


(1) H/O heart bypass surgery








Family History





Heart disease





Social History


Smoking Status:  Never Smoker


Drug Use:  none


Marital Status:  


Housing Status:  lives with significant other


Occupation Status:  retired





Current/Historical Medications


Scheduled


Amlodipine (Norvasc), 10 MG PO DAILY


Aspirin (Aspirin EC Low Dose), 81 MG PO DAILY


Atorvastatin (Lipitor), 40 MG PO DAILY


Cephalexin Monohydrate (Keflex), 500 MG PO QID


Cholecalciferol (Vitamin D3), 1 TAB PO DAILY


Clopidogrel (Plavix), 75 MG PO DAILY


Cyanocobalamin (Vitamin B-12 1000 Mcg), 1,000 MCG PO DAILY


Insulin Glargine (Lantus Solostar), 18-20 UNITS SQ QAM


Insulin Lispro (Human) (Humalog Kwikpen), 8 UNITS SC TIDM


Isosorbide Mononitrate Ext Rel (Imdur Ext Rel), 120 MG PO QAM


Meclizine HCl (Meclizine HCl), 25 MG PO Q6H


Pantoprazole (Protonix), 40 MG PO DAILY





Scheduled PRN


Furosemide (Furosemide), 20 MG PO DAILY PRN for Fluid Retention


Nitroglycerin (Nitrostat), 0.4 MG SL UD PRN for Chest Pain





Allergies


Coded Allergies:  


     No Known Allergies (Verified , 1/29/17)





Physical Exam


Vital Signs











  Date Time  Temp Pulse Resp B/P Pulse Ox O2 Delivery O2 Flow Rate FiO2


 


1/29/17 17:13  59 20 155/61 96 Room Air  


 


1/29/17 16:09  57      


 


1/29/17 15:42 36.4 62 24 158/67 100 Room Air  











Physical Exam


CONSTITUTIONAL/VITAL SIGNS: Reviewed / noted above.


GENERAL: Non-toxic in appearance. 


INTEGUMENTARY: Warm, dry, and Pink.


HEAD: Normocephalic.


EYES: without scleral icterus or trauma.


ENT/OROPHARYNX: clear and moist.


LYMPHADENOPATHY/NECK: Is supple without lymphadenopathy or meningismus.


RESPIRATORY: Lungs clear and equal.


CARDIOVASCULAR: Regular rate and rhythm.


GI/ABDOMEN: Soft and nontender. No organomegaly or pulsatile mass. No rebound 

or guarding. Normal bowel sounds.


EXTREMITIES: Warm and well perfused.


BACK: No CVA tenderness.


NEUROLOGICAL: Intact without focal deficits. 


PSYCHIATRIC: normal affect.


MUSCULOSKELETAL: Normally developed with good muscle tone.





Medical Decision & Procedures


Laboratory Results


1/29/17 15:49








Red Blood Count 2.74, Mean Corpuscular Volume 91.6, Mean Corpuscular Hemoglobin 

29.6, Mean Corpuscular Hemoglobin Concent 32.3, Mean Platelet Volume 11.4





1/29/17 15:49

















Test


  1/29/17


15:49


 


White Blood Count


  12.21 K/uL


(4.8-10.8)


 


Red Blood Count


  2.74 M/uL


(4.7-6.1)


 


Hemoglobin


  8.1 g/dL


(14.0-18.0)


 


Hematocrit 25.1 % (42-52) 


 


Mean Corpuscular Volume


  91.6 fL


()


 


Mean Corpuscular Hemoglobin


  29.6 pg


(25-34)


 


Mean Corpuscular Hemoglobin


Concent 32.3 g/dl


(32-36)


 


Platelet Count


  260 K/uL


(130-400)


 


Mean Platelet Volume


  11.4 fL


(7.4-10.4)


 


RDW Standard Deviation


  61.4 fL


(36.4-46.3)


 


RDW Coefficient of Variation


  19.0 %


(11.5-14.5)


 


Anion Gap


  12.0 mmol/L


(3-11)


 


Est Creatinine Clear Calc


Drug Dose 27.3 ml/min 


 


 


Estimated GFR (


American) 28.3 


 


 


Estimated GFR (Non-


American 24.4 


 


 


BUN/Creatinine Ratio 11.1 (10-20) 


 


Bedside Glucose


  96 mg/dl


(70-99)


 


Calcium Level


  8.4 mg/dl


(8.5-10.1)





Laboratory results as stated above per my review.





Medications Administered











 Medications


  (Trade)  Dose


 Ordered  Sig/Mariposa


 Route  Start Time


 Stop Time Status Last Admin


Dose Admin


 


 Sodium Chloride


  (Nss 1000ml)  1,000 ml @ 


 999 mls/hr  Q1H1M STAT


 IV  1/29/17 16:21


 1/29/17 17:21 DC 1/29/17 17:09


999 MLS/HR











ED Course


1618: Previous medical records were reviewed. The patient was evaluated in room 

A11. A complete history and physical examination was performed.





1621: Sodium Chloride 1,000 ml @ 999 mls/hr IV. 





1729: On reevaluation, the patient is hemodynamically stable. I discussed the 

results and findings with the patient. He verbalized agreement of the treatment 

plan. He was discharged home.





Medical Decision


The patient is a 81 year old male who presents to the ED with complaints of 

hypoglycemia.  The patient states that he took 8 units of Humalog insulin for a 

blood sugar 160.  His wife was watching a movie and she did not cook dinner 

right away.  She began cooking some chicken and when she called her , he 

did not respond.  He was found unresponsive by family and EMS was called.  A 

blood sugar was reportedly 60.  D10 was administered and the patient's blood 

sugar improved and the patient became awake and alert.  On my evaluation, the 

patient is awake, alert and oriented.  His blood sugar here was 96.  He was 

fed.  Blood work reveals hemoglobin of 8.1.  Based on his last admission, this 

is multifactorial and chronic.  The patient's BUN was 27 and the creatinine was 

2.4.  This is slightly worse than his baseline but this is likely related to 

dehydration.  He was hydrated with IV fluids.  On reassessment, the patient is 

continuing to feel well.  He has no complaints.  He was fed.  He is felt to be 

stable for discharge with family.





Differential includes acute coronary syndrome, myocardial infarction, CVA, TIA, 

anemia, infection, pneumonia, UTI, pyelonephritis, poor nutrition, dehydration, 

electrolyte disturbance,hypoglycemia.





Impression





 Primary Impression:  


 Hypoglycemia


 Additional Impression:  


 Anemia





Scribe Attestation


The scribe's documentation has been prepared under my direction and personally 

reviewed by me in its entirety. I confirm that the note above accurately 

reflects all work, treatment, procedures, and medical decision making performed 

by me.





Departure Information


Dispostion


Home / Self-Care





Referrals


Isai Lucas M.D. (PCP)





Forms


HOME CARE DOCUMENTATION FORM,                                                 

               IMPORTANT VISIT INFORMATION, WORK / SCHOOL INSTRUCTIONS





Patient Instructions


My [facility]





Additional Instructions





Follow-up with your doctor as scheduled.





Return for any concerns or worsening.





Problem Qualifiers

## 2017-02-13 ENCOUNTER — HOSPITAL ENCOUNTER (OUTPATIENT)
Dept: HOSPITAL 45 - C.LABPVFM | Age: 82
Discharge: HOME | End: 2017-02-13
Attending: INTERNAL MEDICINE
Payer: COMMERCIAL

## 2017-02-13 DIAGNOSIS — E11.9: ICD-10-CM

## 2017-02-13 DIAGNOSIS — D64.9: Primary | ICD-10-CM

## 2017-02-13 LAB
ANION GAP SERPL CALC-SCNC: 12 MMOL/L (ref 3–11)
BUN SERPL-MCNC: 19 MG/DL (ref 7–18)
BUN/CREAT SERPL: 8.4 (ref 10–20)
CALCIUM SERPL-MCNC: 8.8 MG/DL (ref 8.5–10.1)
CHLORIDE SERPL-SCNC: 104 MMOL/L (ref 98–107)
CO2 SERPL-SCNC: 22 MMOL/L (ref 21–32)
CREAT SERPL-MCNC: 2.3 MG/DL (ref 0.6–1.4)
EOSINOPHIL NFR BLD AUTO: 133 K/UL (ref 130–400)
GLUCOSE SERPL-MCNC: 247 MG/DL (ref 70–99)
HCT VFR BLD CALC: 32 % (ref 42–52)
MCH RBC QN AUTO: 28.9 PG (ref 25–34)
MCHC RBC AUTO-ENTMCNC: 31.3 G/DL (ref 32–36)
MCV RBC AUTO: 92.5 FL (ref 80–100)
NRBC BLD AUTO-RTO: 0.3 %
PLATELET # BLD EST: NORMAL 10*3/UL
PMV BLD AUTO: 11.6 FL (ref 7.4–10.4)
POTASSIUM SERPL-SCNC: 4 MMOL/L (ref 3.5–5.1)
RBC # BLD AUTO: 3.46 M/UL (ref 4.7–6.1)
SODIUM SERPL-SCNC: 138 MMOL/L (ref 136–145)
WBC # BLD AUTO: 7.64 K/UL (ref 4.8–10.8)

## 2017-02-28 ENCOUNTER — HOSPITAL ENCOUNTER (OUTPATIENT)
Dept: HOSPITAL 45 - C.LABPVFM | Age: 82
Discharge: HOME | End: 2017-02-28
Attending: INTERNAL MEDICINE
Payer: COMMERCIAL

## 2017-02-28 DIAGNOSIS — N18.4: Primary | ICD-10-CM

## 2017-02-28 LAB
ANION GAP SERPL CALC-SCNC: 9 MMOL/L (ref 3–11)
BUN SERPL-MCNC: 20 MG/DL (ref 7–18)
BUN/CREAT SERPL: 9.3 (ref 10–20)
CALCIUM SERPL-MCNC: 8.4 MG/DL (ref 8.5–10.1)
CHLORIDE SERPL-SCNC: 107 MMOL/L (ref 98–107)
CO2 SERPL-SCNC: 24 MMOL/L (ref 21–32)
CREAT SERPL-MCNC: 2.1 MG/DL (ref 0.6–1.4)
GLUCOSE SERPL-MCNC: 255 MG/DL (ref 70–99)
MAGNESIUM SERPL-MCNC: 1.6 MG/DL (ref 1.8–2.4)
PHOSPHATE SERPL-MCNC: 3.7 MG/DL (ref 2.5–4.9)
POTASSIUM SERPL-SCNC: 4.3 MMOL/L (ref 3.5–5.1)
SODIUM SERPL-SCNC: 140 MMOL/L (ref 136–145)

## 2017-05-07 ENCOUNTER — HOSPITAL ENCOUNTER (INPATIENT)
Dept: HOSPITAL 45 - C.EDB | Age: 82
LOS: 5 days | Discharge: HOME | DRG: 378 | End: 2017-05-12
Attending: FAMILY MEDICINE | Admitting: STUDENT IN AN ORGANIZED HEALTH CARE EDUCATION/TRAINING PROGRAM
Payer: COMMERCIAL

## 2017-05-07 VITALS
BODY MASS INDEX: 26.76 KG/M2 | HEIGHT: 70 IN | BODY MASS INDEX: 26.76 KG/M2 | BODY MASS INDEX: 26.76 KG/M2 | WEIGHT: 186.95 LBS | HEIGHT: 70 IN | WEIGHT: 186.95 LBS

## 2017-05-07 VITALS
OXYGEN SATURATION: 96 % | HEART RATE: 58 BPM | DIASTOLIC BLOOD PRESSURE: 70 MMHG | SYSTOLIC BLOOD PRESSURE: 152 MMHG | TEMPERATURE: 97.7 F

## 2017-05-07 VITALS — DIASTOLIC BLOOD PRESSURE: 71 MMHG | SYSTOLIC BLOOD PRESSURE: 151 MMHG | OXYGEN SATURATION: 97 % | HEART RATE: 60 BPM

## 2017-05-07 VITALS
OXYGEN SATURATION: 95 % | DIASTOLIC BLOOD PRESSURE: 58 MMHG | TEMPERATURE: 97.88 F | HEART RATE: 65 BPM | SYSTOLIC BLOOD PRESSURE: 103 MMHG

## 2017-05-07 VITALS
TEMPERATURE: 97.52 F | HEART RATE: 58 BPM | OXYGEN SATURATION: 97 % | DIASTOLIC BLOOD PRESSURE: 72 MMHG | SYSTOLIC BLOOD PRESSURE: 149 MMHG

## 2017-05-07 VITALS
HEART RATE: 74 BPM | TEMPERATURE: 97.7 F | DIASTOLIC BLOOD PRESSURE: 66 MMHG | OXYGEN SATURATION: 99 % | SYSTOLIC BLOOD PRESSURE: 157 MMHG

## 2017-05-07 VITALS — HEART RATE: 67 BPM | SYSTOLIC BLOOD PRESSURE: 105 MMHG | DIASTOLIC BLOOD PRESSURE: 58 MMHG | OXYGEN SATURATION: 98 %

## 2017-05-07 VITALS
TEMPERATURE: 98.06 F | HEART RATE: 70 BPM | OXYGEN SATURATION: 95 % | SYSTOLIC BLOOD PRESSURE: 103 MMHG | DIASTOLIC BLOOD PRESSURE: 62 MMHG

## 2017-05-07 VITALS
TEMPERATURE: 97.34 F | HEART RATE: 57 BPM | DIASTOLIC BLOOD PRESSURE: 69 MMHG | OXYGEN SATURATION: 95 % | SYSTOLIC BLOOD PRESSURE: 122 MMHG

## 2017-05-07 VITALS
SYSTOLIC BLOOD PRESSURE: 146 MMHG | OXYGEN SATURATION: 98 % | HEART RATE: 62 BPM | TEMPERATURE: 98.06 F | DIASTOLIC BLOOD PRESSURE: 67 MMHG

## 2017-05-07 VITALS
OXYGEN SATURATION: 96 % | HEART RATE: 69 BPM | TEMPERATURE: 98.24 F | SYSTOLIC BLOOD PRESSURE: 130 MMHG | DIASTOLIC BLOOD PRESSURE: 60 MMHG

## 2017-05-07 VITALS
SYSTOLIC BLOOD PRESSURE: 139 MMHG | DIASTOLIC BLOOD PRESSURE: 68 MMHG | HEART RATE: 62 BPM | OXYGEN SATURATION: 100 % | TEMPERATURE: 97.52 F

## 2017-05-07 DIAGNOSIS — I95.9: ICD-10-CM

## 2017-05-07 DIAGNOSIS — R23.1: ICD-10-CM

## 2017-05-07 DIAGNOSIS — Z79.4: ICD-10-CM

## 2017-05-07 DIAGNOSIS — E11.22: ICD-10-CM

## 2017-05-07 DIAGNOSIS — Z95.1: ICD-10-CM

## 2017-05-07 DIAGNOSIS — K31.811: Primary | ICD-10-CM

## 2017-05-07 DIAGNOSIS — D47.2: ICD-10-CM

## 2017-05-07 DIAGNOSIS — D72.829: ICD-10-CM

## 2017-05-07 DIAGNOSIS — Z82.49: ICD-10-CM

## 2017-05-07 DIAGNOSIS — R00.1: ICD-10-CM

## 2017-05-07 DIAGNOSIS — Z79.02: ICD-10-CM

## 2017-05-07 DIAGNOSIS — N18.4: ICD-10-CM

## 2017-05-07 DIAGNOSIS — Z79.899: ICD-10-CM

## 2017-05-07 DIAGNOSIS — I12.9: ICD-10-CM

## 2017-05-07 DIAGNOSIS — D62: ICD-10-CM

## 2017-05-07 DIAGNOSIS — N17.9: ICD-10-CM

## 2017-05-07 DIAGNOSIS — K44.9: ICD-10-CM

## 2017-05-07 DIAGNOSIS — E78.5: ICD-10-CM

## 2017-05-07 DIAGNOSIS — D69.6: ICD-10-CM

## 2017-05-07 DIAGNOSIS — K21.9: ICD-10-CM

## 2017-05-07 DIAGNOSIS — I25.2: ICD-10-CM

## 2017-05-07 DIAGNOSIS — K22.2: ICD-10-CM

## 2017-05-07 DIAGNOSIS — I25.10: ICD-10-CM

## 2017-05-07 LAB
ALP SERPL-CCNC: 52 U/L (ref 45–117)
ALT SERPL-CCNC: 14 U/L (ref 12–78)
ANION GAP SERPL CALC-SCNC: 14 MMOL/L (ref 3–11)
ANION GAP SERPL CALC-SCNC: 22 MMOL/L (ref 16–25)
AST SERPL-CCNC: 9 U/L (ref 15–37)
B-OH-BUTYR SERPL-SCNC: 1.29 MG/DL (ref 0.2–2.81)
BUN SERPL-MCNC: 54 MG/DL (ref 7–18)
BUN/CREAT SERPL: 19.2 (ref 10–20)
CA-I BLD-SCNC: 1.11 MMOL/L (ref 1.12–1.32)
CALCIUM SERPL-MCNC: 8.1 MG/DL (ref 8.5–10.1)
CHLORIDE BLD-SCNC: 105 MEQ/L (ref 101–112)
CHLORIDE SERPL-SCNC: 107 MMOL/L (ref 98–107)
CO2 SERPL-SCNC: 20 MMOL/L (ref 21–32)
COMPLETE: YES
CREAT BLD-MCNC: 2.6 MG/DL (ref 0.6–1.3)
CREAT CL PREDICTED SERPL C-G-VRATE: 23.4 ML/MIN
CREAT SERPL-MCNC: 2.8 MG/DL (ref 0.6–1.4)
EOSINOPHIL NFR BLD AUTO: 67 K/UL (ref 130–400)
EOSINOPHIL NFR BLD: 0.8 %
GLUCOSE SERPL-MCNC: 340 MG/DL (ref 70–99)
HCT VFR BLD AUTO: 21 % (ref 42–52)
HCT VFR BLD CALC: 21.2 % (ref 42–52)
HCT VFR BLD CALC: 22 % (ref 42–52)
HGB BLD-MCNC: 7.1 G/DL (ref 14–18)
INR PPP: 1.1 (ref 0.9–1.1)
ISTAT CARBON DIOXIDE: 18 MEQ/L (ref 24–31)
LYMPHOCYTES # BLD: 1.03 K/UL (ref 1.2–3.4)
LYMPHOCYTES NFR BLD: 7.6 %
MCH RBC QN AUTO: 27.6 PG (ref 25–34)
MCHC RBC AUTO-ENTMCNC: 31.4 G/DL (ref 32–36)
MCV RBC AUTO: 88 FL (ref 80–100)
NEUTROPHILS NFR BLD AUTO: 75.5 %
PARTIAL THROMBOPLASTIN RATIO: 0.9
PARTIAL THROMBOPLASTIN RATIO: 0.9
PLATELET # BLD EST: (no result) 10*3/UL
POTASSIUM SERPL-SCNC: 4.8 MMOL/L (ref 3.5–5.1)
PROTHROMBIN TIME: 11.6 SECONDS (ref 9–12)
RBC # BLD AUTO: 2.5 M/UL (ref 4.7–6.1)
SODIUM BLD-SCNC: 138 MEQ/L (ref 135–144)
SODIUM SERPL-SCNC: 141 MMOL/L (ref 136–145)
WBC # BLD AUTO: 13.51 K/UL (ref 4.8–10.8)

## 2017-05-07 RX ADMIN — PANTOPRAZOLE SODIUM SCH MLS/HR: 40 INJECTION, POWDER, FOR SOLUTION INTRAVENOUS at 21:09

## 2017-05-07 RX ADMIN — INSULIN ASPART SCH UNITS: 100 INJECTION, SOLUTION INTRAVENOUS; SUBCUTANEOUS at 19:03

## 2017-05-07 RX ADMIN — SODIUM CHLORIDE SCH MLS/HR: 900 INJECTION, SOLUTION INTRAVENOUS at 17:17

## 2017-05-07 NOTE — GASTROENTEROLOGY PROGRESS NOTE
Progress Note


Date of Service:  May 7, 2017


Subjective


Pt evaluation today including:  conversation w/ patient, conversation w/ family 

(wife and grandson), physical exam, chart review (EMR this admit, admit for GI 

bleed 2016 and outpt EMR capsule), lab review, review of studies, review of 

inpatient medication list





Medications





 Current Inpatient Medications








 Medications


  (Trade)  Dose


 Ordered  Sig/Mariposa


 Route  Start Time


 Stop Time Status Last Admin


Dose Admin


 


 Pantoprazole


 Sodium 40 mg/


 Dextrose  100 ml @ 


 20 mls/hr  Q5H


 IV  5/7/17 14:00


 5/7/17 18:59  5/7/17 14:16


20 MLS/HR


 


 Sodium Chloride


  (Nss 1000ml)  1,000 ml @ 


 125 mls/hr  Q8H


 IV  5/7/17 15:55


 6/6/17 15:54  5/7/17 17:17


125 MLS/HR


 


 Ondansetron HCl


  (Zofran Inj)  4 mg  Q6H  PRN


 IV  5/7/17 16:00


 6/6/17 15:59   


 


 


 Cyanocobalamin


  (Vitamin B-12


 Tab)  1,000 mcg  DAILY


 PO  5/8/17 09:00


 6/7/17 08:59   


 


 


 Nitroglycerin


  (Nitrostat Tab)  0.4 mg  UD  PRN


 SL  5/7/17 16:00


 6/6/17 15:59   


 


 


 Insulin Glargine


  (Lantus Solostar


 Pen)  14 unit  QAM


 SC  5/8/17 09:00


 6/7/17 08:59   


 


 


 Glucose


  (Glucose 40% Gel)  15-30


 GRAMS 15


 GRAMS...  UD  PRN


 PO  5/7/17 16:15


 6/6/17 16:14   


 


 


 Glucose


  (Glucose Chew


 Tab)  4-8


 Tablets 4


 Tabl...  UD  PRN


 PO  5/7/17 16:15


 6/6/17 16:14   


 


 


 Dextrose


  (Dextrose 50%


 50ML Syringe)  25-50ML OF


 50% DW IV


 FOR...  UD  PRN


 IV  5/7/17 16:15


 6/6/17 16:14   


 


 


 Glucagon


  (Glucagon Inj)  1 mg  UD  PRN


 SQ  5/7/17 16:15


 6/6/17 16:14   


 


 


 Insulin Aspart


  (novoLOG ASPART)  SLIDING


 SCALE  Q6


 SC  5/7/17 18:00


 6/6/17 17:59   


 











Objective


Vital Signs











  Date Time  Temp Pulse Resp B/P Pulse Ox O2 Delivery O2 Flow Rate FiO2


 


5/7/17 17:47 36.7 62 22 146/67 98 Room Air  


 


5/7/17 16:30 36.4 62 20 139/68 100 Room Air  


 


5/7/17 16:12      Room Air  


 


5/7/17 15:11  61 20 120/53 99 Room Air  


 


5/7/17 14:18  58 18 95/47 98 Room Air  


 


5/7/17 14:00  67  114/51    


 


5/7/17 13:45  56  81/41    


 


5/7/17 13:38  57 24 96/45 98 Room Air  


 


5/7/17 13:27  58      


 


5/7/17 13:22     95 Room Air  


 


5/7/17 13:22  58 18 97/58 99 Room Air  


 


5/7/17 13:14 36.4 93 24 77/46 100 Room Air  











Laboratory Results





Last 24 Hours








Test


  5/7/17


13:46 5/7/17


13:52 5/7/17


13:54 5/7/17


16:26


 


White Blood Count 13.51 K/uL    


 


Red Blood Count 2.50 M/uL    


 


Hemoglobin 6.9 g/dL    6.7 g/dL 


 


Hematocrit 22.0 %    21.2 % 


 


Mean Corpuscular Volume 88.0 fL    


 


Mean Corpuscular Hemoglobin 27.6 pg    


 


Mean Corpuscular Hemoglobin


Concent 31.4 g/dl 


  


  


  


 


 


Platelet Count 67 K/uL    


 


RDW Standard Deviation 64.8 fL    


 


RDW Coefficient of Variation 20.2 %    


 


Neutrophils % (Manual) 75.5 %    


 


Lymphocytes % (Manual) 7.6 %    


 


Monocytes % (Manual) 16.1 %    


 


Eosinophils % (Manual) 0.8 %    


 


Neutrophils # (Manual) 10.20 K/uL    


 


Total Absolute Neutrophils 10.20 K/uL    


 


Lymphocytes # (Manual) 1.03 K/uL    


 


Total Absolute Lymphocytes 1.03 K/uL    


 


Monocytes # (Manual) 2.18 K/uL    


 


Eosinophils # (Manual) 0.11 K/uL    


 


Platelet Estimate DECREASED    


 


Red Blood Cell Morphology Unremarkable    


 


Prothrombin Time 11.6 SECONDS    


 


Prothromb Time International


Ratio 1.1 


  


  


  


 


 


Activated Partial


Thromboplast Time 23.6 SECONDS 


  


  


  


 


 


Partial Thromboplastin Ratio 0.9    


 


Sodium Level 141 mmol/L    


 


Potassium Level 4.8 mmol/L    


 


Chloride Level 107 mmol/L    


 


Carbon Dioxide Level 20 mmol/L    


 


Anion Gap 14.0 mmol/L  22.0 mmol/L   


 


Blood Urea Nitrogen 54 mg/dl    


 


Creatinine 2.80 mg/dl    


 


Est Creatinine Clear Calc


Drug Dose 23.4 ml/min 


  


  


  


 


 


Estimated GFR (


American) 23.5 


  


  


  


 


 


Estimated GFR (Non-


American 20.2 


  


  


  


 


 


BUN/Creatinine Ratio 19.2    


 


Random Glucose 340 mg/dl    


 


Calcium Level 8.1 mg/dl    


 


Total Bilirubin 0.3 mg/dl    


 


Direct Bilirubin < 0.1 mg/dl    


 


Aspartate Amino Transf


(AST/SGOT) 9 U/L 


  


  


  


 


 


Alanine Aminotransferase


(ALT/SGPT) 14 U/L 


  


  


  


 


 


Alkaline Phosphatase 52 U/L    


 


Total Protein 6.6 gm/dl    


 


Albumin 3.1 gm/dl    


 


Lipase 223 U/L    


 


Beta-Hydroxybutyric Acid 1.29 mg/dL    


 


Bedside Hemoglobin  7.1 g/dl   


 


Bedside Hematocrit  21 %   


 


Bedside Sodium  138 mEq/L   


 


Bedside Potassium  4.9 mEq/L   


 


Bedside Chloride  105 mEq/L   


 


Bedside Total CO2  18 mEq/l   


 


Bedside Blood Urea Nitrogen  53 mg/dl   


 


Bedside Creatinine  2.6 mg/dl   


 


Bedside Glucose (other)  334 mg/dl   


 


Bedside Ionized Calcium (Najma)  1.11 mmol/l   


 


Bedside Troponin I   0.000 ng/ml  














Test


  5/7/17


16:30 5/7/17


17:24 


  


 


 


Activated Partial


Thromboplast Time 23.1 SECONDS 


  


  


  


 


 


Partial Thromboplastin Ratio 0.9    


 


Bedside Glucose  245 mg/dl   











Assessment and Plan


GI consult dictated  Job:   [id number]





GI bleeding---5/2016 admit with EGD /colo then outpt capsule negative--dark 

stool suggests UGI source so plan EGD tomorrow. Procedure and risks explained 

to patient and wife which include but not limited to medication reaction, 

bleeding, perforation, aspiration. Agree with protonix to cover UGI bleeding








acute blood loss anemia---transfuse prn





thrombocytopenia--low but should be adequate to prevent spontaneous GI bleeding

## 2017-05-07 NOTE — DIAGNOSTIC IMAGING REPORT
CHEST ONE VIEW PORTABLE



CLINICAL HISTORY: Torus of breath. Gastrointestinal hemorrhage    



COMPARISON STUDY:  1/21/2017



FINDINGS: There are postsurgical changes of a midline sternotomy. The heart is

normal in size. There is no failure. There is persistent elevation right

hemidiaphragm. There are improving right basilar atelectatic changes.[ There is

no lobar consolidation.



IMPRESSION: 

1. Persistent elevation right hemidiaphragm. Improving right basilar atelectatic

change.







Electronically signed by:  Aníbal Rene M.D.

5/7/2017 2:09 PM



Dictated Date/Time:  5/7/2017 2:08 PM

## 2017-05-07 NOTE — GASTROINTESTINAL CONSULTATION
DATE OF CONSULTATION:  05/07/2017

 

DATE OF CONSULTATION:  05/07/2017.  

 

REQUESTING PHYSICIAN:  Hospitalist.

 

REASON FOR CONSULTATION:  GI bleeding.

 

CHIEF COMPLAINT OF THE PATIENT:  Bleeding.

 

HISTORY OF PRESENT ILLNESS:  The patient had upper GI bleeding and dark

stools in May 2016.   I reviewed that admission from 05/03/2016 to 

05/06/2016.  EGD showed a small hiatal hernia.  Colonoscopy showed some dark

liquid in  the colon, but bilious fluid in the terminal ileum, otherwise

normal.  A capsule endoscopy done as an outpatient  06/08/2016 showed

duodenal diverticulum, otherwise unremarkable.  No further bleeding until 
started

having black stools, maybe 3 days prior to this admit, none yesterday.   He 
became short of

breath and weak and lightheaded and came to the Emergency Room with 

hemoglobin  initial noted to 6.9 versus 10 on 02/13/2017.  No abdominal pain.

 Stools are somewhat looser.  Overall no dysphagia, no change in  weight,

feels cold.  No nausea, vomiting.

 

PAST MEDICAL HISTORY:  

 

ALLERGIES:  Negative.

 

MEDICATIONS ON ADMISSION:  Norvasc, Lipitor, vitamin D, Plavix, vitamin B12,

insulin, Imdur, Protonix, Lasix, nitrite.  Denies any nonsteroidal use or 

aspirin use.

 

PROBLEMS AND SURGERY:  Coronary artery disease, history MI, chronic kidney

disease, monoclonal  gammopathy,  diabetes, thrombocytopenia.  He has had a

CABG.

 

FAMILY HISTORY:  Coronary artery disease.

 

SOCIAL HISTORY:  Tobacco negative.

 

REVIEW OF SYSTEMS:

CONSTITUTIONAL:  Lightheaded.  

EYES:  Negative.  

EARS, NOSE, MOUTH AND THROAT:  Negative.

CARDIOVASCULAR:  Negative.

RESPIRATORY:  Short of breath.

GENITOURINARY:  Negative.

MUSCULOSKELETAL:  Back pain.  

INTEGUMENTARY, NEUROLOGIC, PSYCHOLOGIC, ENDOCRINE, HEMATOLOGIC:  Negative

except as noted above.

 

PHYSICAL EXAMINATION:

GENERAL:  Male appears stated age in no acute distress.  

VITAL SIGNS:  Most recent vital signs in chart, temp 36.7, pulse 62,

respiration  22, /67, O2 saturation 98% on room air.

EYES:  Conjunctivae and lids normal.

EARS, NOSE, THROAT:  Oropharynx clear.

NECK:  Without obvious mass or thyroid enlargement.

RESPIRATORY:  Normal effort, clear to anterior auscultation.

CARDIOVASCULAR:  Without obvious murmur.

EXTREMITIES:  Without edema.

ABDOMEN:  Positive bowel sounds, nontender, no obvious organomegaly or masses

are appreciated.

RECTAL EXAMINATION:  Done by the Emergency Room dark heme positive stool, not

repeated by me.

LYMPH:  No obvious neck or groin nodes.

MUSCULOSKELETAL:  Digits and nails normal.

SKIN:  Without obvious rash or induration anteriorly.

NEUROLOGIC:  Cranial nerves intact.  Sensation intact.

PSYCHIATRIC:  Recent and remote memory good.  Insight and judgment good.

 

LABORATORY DATA:  CBC, hemoglobin 6.9, platelets 67,000.  PT, PTT were okay. 

CMP normal LFTs.  Lipase was normal.  Chest x-ray elevated right

hemidiaphragm.

 

IMPRESSION AND PLAN:

1. Gastrointestinal bleeding.  Previous workup in 2016 normal.  The dark

stools suggests upper GI source.  We will repeat an EGD tomorrow.  I would

transfuse tonight.  I explained the procedure and risk to the patient and

wife.  The risks  include but are not limited to medication reaction,

bleeding, perforation, aspiration.   Agree with Protonix in the meantime.

2. Acute blood loss anemia.  Transfuse p.r.n.

3. Thrombocytopenia.  Low but should be adequate to prevent spontaneous

bleeding.

 

 

 

MTDD

## 2017-05-07 NOTE — HISTORY AND PHYSICAL
History & Physical


Date & Time of Service:


May 7, 2017 at 14:54


Chief Complaint:


Light Headed, Sob, Dark Stool


Primary Care Physician:


Isai Lucas M.D.


History of Present Illness


Patient is an 81 year old male with a history of End Stage Renal Disease, GERD, 

HLD, and Thrombocytopenia that presents with GI bleeding that has been going on 

since Thursday.


Thursday his stool started to look dark but he did not mention it to any of his 

family members until today. This week his wife states that he was getting very 

short of breath with ambulation, was stumbling while walking, and was very 

lightheaded when getting out of bed this morning. His sons were needing to help 

him walk out of fear that he would have a fall. A few years ago he had a 

similar incidence of bleeding while taking aspirin daily and had a dark stools, 

eventually requiring blood transfusions. He also had a circumcision in February 

and had an episode of bleeding and thrombocytopenia that required blood 

transfusions as well. He is currently on plavix for his previous history of 

CAD. Patient states that he did once have hemorrhoids around 50 years ago but 

otherwise provides no history.





Patient denies any abdominal pain, denies seeing any blood on toilet paper when 

wiping, denies seeing any gross blood in the stool or in the toilet. Patient 

denies any vomiting, coughing up blood, or any heartburn.





His blood sugars were also in the 350s this morning when he took his blood 

glucose. His blood pressure at once point was 160 systolic on the home blood 

pressure cuff.





Past Medical/Surgical History


Medical Problems:


(1) Anemia


Status: Chronic  





(2) End stage renal disease


Status: Chronic  





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


Status: Chronic  





(4) Thrombocytopenia


Status: Chronic  





Surgical Problems:


(1) H/O heart bypass surgery


Status: Chronic  








Family History





Heart disease





Social History


Smoking Status:  Never Smoker


Drug Use:  none


Marital Status:  


Housing status:  lives with family


Occupational Status:  retired





Immunizations


History of Influenza Vaccine:  Yes


History of Tetanus Vaccine?:  Yes


History of Pneumococcal:  Yes


History of Hepatitis B Vaccine:  No





Multi-Drug Resistant Organisms


History of MDRO:  No





Allergies


Coded Allergies:  


     No Known Allergies (Verified , 5/7/17)





Home Medications


Scheduled


Amlodipine (Norvasc), 10 MG PO DAILY


Atorvastatin (Lipitor), 40 MG PO DAILY


Cholecalciferol (Vitamin D3), 1 TAB PO DAILY


Clopidogrel (Plavix), 75 MG PO DAILY


Cyanocobalamin (Vitamin B-12 1000 Mcg), 1,000 MCG PO DAILY


Insulin Glargine (Lantus Solostar), 18-20 UNITS SQ QAM


Insulin Lispro (Human) (Humalog Kwikpen), 8 UNITS SC TIDM


Isosorbide Mononitrate Ext Rel (Imdur Ext Rel), 120 MG PO QAM


Pantoprazole (Protonix), 40 MG PO DAILY





Scheduled PRN


Furosemide (Furosemide), 20 MG PO DAILY PRN for Fluid Retention


Nitroglycerin (Nitrostat), 0.4 MG SL UD PRN for Chest Pain





Review of Systems


Constitutional:  + fatigue, + weakness, No chills, No fever, No weight loss


ENT:  No unusual epistaxis


Respiratory:  + dyspnea on exertion, + shortness of breath, No cough, No 

dyspnea at rest, No hemoptysis, No sputum


Abdomen:  + GI bleeding, No diarrhea, No nausea, No pain, No vomiting


Genitourinary - Male:  No dysuria, No hematuria, No urinary frequency


Endocrine:  + fatigue





Physical Exam


Vital Signs











  Date Time  Temp Pulse Resp B/P Pulse Ox O2 Delivery O2 Flow Rate FiO2


 


5/7/17 14:18  58 18 95/47 98 Room Air  


 


5/7/17 14:00  67  114/51    


 


5/7/17 13:45  56  81/41    


 


5/7/17 13:38  57 24 96/45 98 Room Air  


 


5/7/17 13:27  58      


 


5/7/17 13:22     95 Room Air  


 


5/7/17 13:22  58 18 97/58 99 Room Air  


 


5/7/17 13:14 36.4 93 24 77/46 100 Room Air  








General Appearance:  WD/WN, no apparent distress


Head:  normocephalic, atraumatic


Eyes:  normal inspection, PERRL, EOMI, sclerae normal


Neck:  supple, no carotid bruits, trachea midline


Respiratory/Chest:  chest non-tender, lungs clear, normal breath sounds, no 

respiratory distress, no accessory muscle use


Cardiovascular:  regular rate, rhythm, no edema, no gallop, no murmur, normal 

peripheral pulses


Abdomen/GI:  normal bowel sounds, non tender, soft, no pulsatile mass, normal 

rectal exam


Back:  normal inspection, no CVA tenderness


Extremities/Musculoskelatal:  normal inspection, no calf tenderness, no pedal 

edema


Neurologic/Psych:  CNs II-XII nml as tested, no motor/sensory deficits, alert, 

normal mood/affect, oriented x 3





Diagnostics


Laboratory Results





Results Past 24 Hours








Test


  5/7/17


13:46 5/7/17


13:52 5/7/17


13:54 Range/Units


 


 


White Blood Count 13.51   4.8-10.8  K/uL


 


Red Blood Count 2.50   4.7-6.1  M/uL


 


Hemoglobin 6.9   14.0-18.0  g/dL


 


Hematocrit 22.0   42-52  %


 


Mean Corpuscular Volume 88.0     fL


 


Mean Corpuscular Hemoglobin 27.6   25-34  pg


 


Mean Corpuscular Hemoglobin


Concent 31.4


  


  


  32-36  g/dl


 


 


Platelet Count 67   130-400  K/uL


 


RDW Standard Deviation 64.8   36.4-46.3  fL


 


RDW Coefficient of Variation 20.2   11.5-14.5  %


 


Neutrophils % (Manual) 75.5    %


 


Lymphocytes % (Manual) 7.6    %


 


Monocytes % (Manual) 16.1    %


 


Eosinophils % (Manual) 0.8    %


 


Neutrophils # (Manual) 10.20   1.4-6.5  K/uL


 


Total Absolute Neutrophils 10.20   1.4-6.5  K/uL


 


Lymphocytes # (Manual) 1.03   1.2-3.4  K/uL


 


Total Absolute Lymphocytes 1.03   1.2-3.4  K/uL


 


Monocytes # (Manual) 2.18   0.11-0.59  K/uL


 


Eosinophils # (Manual) 0.11   0-0.5  K/uL


 


Platelet Estimate DECREASED    


 


Red Blood Cell Morphology Unremarkable    


 


Prothrombin Time


  11.6


  


  


  9.0-12.0


SECONDS


 


Prothromb Time International


Ratio 1.1


  


  


  0.9-1.1  


 


 


Activated Partial


Thromboplast Time 23.6


  


  


  21.0-31.0


SECONDS


 


Partial Thromboplastin Ratio 0.9    


 


Sodium Level 141   136-145  mmol/L


 


Potassium Level 4.8   3.5-5.1  mmol/L


 


Chloride Level 107     mmol/L


 


Carbon Dioxide Level 20   21-32  mmol/L


 


Anion Gap 14.0 22.0  16-25  mmol/L


 


Blood Urea Nitrogen 54   7-18  mg/dl


 


Creatinine


  2.80


  


  


  0.60-1.40


mg/dl


 


Est Creatinine Clear Calc


Drug Dose 23.4


  


  


   ml/min


 


 


Estimated GFR (


American) 23.5


  


  


   


 


 


Estimated GFR (Non-


American 20.2


  


  


   


 


 


BUN/Creatinine Ratio 19.2   10-20  


 


Random Glucose 340   70-99  mg/dl


 


Calcium Level 8.1   8.5-10.1  mg/dl


 


Total Bilirubin 0.3   0.2-1  mg/dl


 


Direct Bilirubin < 0.1   0-0.2  mg/dl


 


Aspartate Amino Transf


(AST/SGOT) 9


  


  


  15-37  U/L


 


 


Alanine Aminotransferase


(ALT/SGPT) 14


  


  


  12-78  U/L


 


 


Alkaline Phosphatase 52     U/L


 


Total Protein 6.6   6.4-8.2  gm/dl


 


Albumin 3.1   3.4-5.0  gm/dl


 


Lipase 223     U/L


 


Beta-Hydroxybutyric Acid 1.29   0.2-2.81  mg/dL


 


Bedside Hemoglobin  7.1  14.0-18.0  g/dl


 


Bedside Hematocrit  21  42-52  %


 


Bedside Sodium  138  135-144  mEq/L


 


Bedside Potassium  4.9  3.3-5.0  mEq/L


 


Bedside Chloride  105  101-112  mEq/L


 


Bedside Total CO2  18  24-31  mEq/l


 


Bedside Blood Urea Nitrogen  53  7-18  mg/dl


 


Bedside Creatinine  2.6  0.6-1.3  mg/dl


 


Bedside Glucose (other)  334  70-99  mg/dl


 


Bedside Ionized Calcium ([place]ma)


  


  1.11


  


  1.12-1.32


mmol/l


 


Bedside Troponin I   0.000 0-0.045  ng/ml











Impression


Assessment and Plan


Patient is an 81 year old male that presents with melena and hypotension





1) Melena with acute blood loss anemia


- Received IV NS Bolus 1L


- IV maintenance fluids 125 ml/hr


- 2 unit PRBC transfusion, 2 units on hold


- H/H q4h


- NPO


- Stool occult blood


- INR/PTT


- Protonix Drip


- Admission to telemetry


- GI consult. Has had negative GI evaluation in 5/16





2) Hypotension


- IV Fluids


- Regular blood pressure monitoring


- Hold home Norvasc and Furosemide





3) Thrombocytopenia with h/o MGUS


- Previous history of low platelets


- Currently on Plavix. Hold


- Initial resuscitation with fluids and blood products


- If remains hemodynamically unstable consider platelet replacement





4) Diabetes


- Lantus 10 units now, 14 units qAM since NPO. On 18-20 units at home





5) Hyperlipidemia


- Hold Lipitor





6) DVT Prophylaxis


- SCDs





7) Resuscitation status


- Full Resuscitation





Level of Care


Telemetry





Advanced Directives


Existing Power of :  Yes





Resuscitation Status


FULL RESUSCITATION





VTE Prophylaxis


VTE Risk Assessment Done? Y/N:  Yes


Risk Level:  High


Given or contraindicated:  SCD's





Reviewed:  Pt Seen/Exam by Me


History


80 y/o with h/o anemia and MGUS here for feeling dizzi and having dark stools


Constitutional:  denies: fever


Respiratory:  negative: short of breath


Cardiovascular:  denies chest pain


Gastrointestinal/Abdominal:  positive: other (dark stools), negative: abdominal 

pain


Musculoskeletal:  negative: back pain


General Appearance:  no apparent distress


Respiratory:  lungs clear, no respiratory distress


Cardiovascular:  regular rate, rhythm


Gastrointestinal:  normal bowel sounds, non tender, soft


Neurologic/Psychiatric:  alert, oriented x 3


Skin Characteristics:  pallor


Assessment/Plan


I have reviewed the medical record and performed a history and physical 

examination of this patient today.  I have discussed the case with Dr. Rutherford. 

The above note reflects my findings, conclusions, and recommendations.

## 2017-05-07 NOTE — EMERGENCY ROOM VISIT NOTE
History


Report prepared by Carol:  Pawel Sepulveda


Under the Supervision of:  Dr. Kike Nicole M.D.


First contact with patient:  13:17


Chief Complaint:  SHORTNESS OF BREATH


Stated Complaint:  LIGHT HEADED, SOB, DARK STOOL





History of Present Illness


The patient is a 81 year old male who presents to the Emergency Room with 

complaints of intermittent dark stools beginning two days ago. He also 

complains of shortness of breath, and lightheadedness. He denies any chest pain 

or abdominal pain. The patient has a history of blood transfusions and a 

stomach bleed. He is on Plavix but no other blood thinners. He notes that he 

had a circumcision three months ago that had a large amount of bleeding.





   Source of History:  patient


   Onset:  two days ago


   Position:  other (global)


   Symptom Intensity:  moderate


   Quality:  other (dark stool)


   Timing:  intermittent


   Associated Symptoms:  + SOB, No abdominal pain, No chest pain


   Note:


The patient also complains of lightheadedness.





Review of Systems


See HPI for pertinent positives & negatives. A total of 10 systems reviewed and 

were otherwise negative.





Past Medical & Surgical


Medical Problems:


(1) ACS (acute coronary syndrome)


(2) Acute kidney injury superimposed on chronic kidney disease


(3) Anemia


(4) Angina at rest


(5) Bradycardia


(6) CKD (chronic kidney disease), stage IV


(7) End stage renal disease


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


(9) NSTEMI, initial episode of care


(10) Precordial chest pain


(11) Thrombocytopenia


Surgical Problems:


(1) H/O heart bypass surgery








Family History





Heart disease





Social History


Smoking Status:  Never Smoker


Drug Use:  none


Marital Status:  


Housing Status:  lives with significant other


Occupation Status:  retired





Current/Historical Medications


Scheduled


Amlodipine (Norvasc), 10 MG PO DAILY


Atorvastatin (Lipitor), 40 MG PO DAILY


Cholecalciferol (Vitamin D3), 1 TAB PO DAILY


Clopidogrel (Plavix), 75 MG PO DAILY


Cyanocobalamin (Vitamin B-12 1000 Mcg), 1,000 MCG PO DAILY


Insulin Glargine (Lantus Solostar), 18-20 UNITS SQ QAM


Insulin Lispro (Human) (Humalog Kwikpen), 8 UNITS SC TIDM


Isosorbide Mononitrate Ext Rel (Imdur Ext Rel), 120 MG PO QAM


Pantoprazole (Protonix), 40 MG PO DAILY





Scheduled PRN


Furosemide (Furosemide), 20 MG PO DAILY PRN for Fluid Retention


Nitroglycerin (Nitrostat), 0.4 MG SL UD PRN for Chest Pain





Allergies


Coded Allergies:  


     No Known Allergies (Verified , 5/7/17)





Physical Exam


Vital Signs











  Date Time  Temp Pulse Resp B/P Pulse Ox O2 Delivery O2 Flow Rate FiO2


 


5/7/17 15:11  61 20 120/53 99 Room Air  


 


5/7/17 14:18  58 18 95/47 98 Room Air  


 


5/7/17 14:00  67  114/51    


 


5/7/17 13:45  56  81/41    


 


5/7/17 13:38  57 24 96/45 98 Room Air  


 


5/7/17 13:27  58      


 


5/7/17 13:22     95 Room Air  


 


5/7/17 13:22  58 18 97/58 99 Room Air  


 


5/7/17 13:14 36.4 93 24 77/46 100 Room Air  











Physical Exam





Constitutional: Vital signs reviewed.  Hypotensive.


Eyes: Pupils are equal round reactive to light.  Conjunctiva are noninjected.  


ENT: Pharynx is clear without erythema or exudate.  Mucous membranes are moist.

  Neck supple without meningeal signs.


Respiratory: Clear to auscultation bilaterally.  Breath sounds are equal 

bilaterally. 


Cardiovascular: Regular rate and rhythm.  No rubs or gallops.


GI: Soft, nondistended and nontender.  Bowel sounds are present.


Rectal: Guaiac positive with dark stool.  No gross blood.


Musculoskeletal: No peripheral edema.  No lower extremity tenderness. 


Integumentary: No cyanosis.  Pale.


Neurological: The patient is awake and alert.  No focal deficits.


Psychiatric: Normal affect.





Medical Decision & Procedures


ER Provider


Diagnostic Interpretation:


X-ray results as stated below per interpretation by me and the radiologist: 





CHEST ONE VIEW PORTABLE





FINDINGS: There are postsurgical changes of a midline sternotomy. The heart is


normal in size. There is no failure. There is persistent elevation right


hemidiaphragm. There are improving right basilar atelectatic changes.[ There is


no lobar consolidation.





IMPRESSION: 


1. Persistent elevation right hemidiaphragm. Improving right basilar atelectatic


change.





Electronically signed by:  Aníbal Rene M.D.





Laboratory Results


5/7/17 13:46








Red Blood Count 2.50, Mean Corpuscular Volume 88.0, Mean Corpuscular Hemoglobin 

27.6, Mean Corpuscular Hemoglobin Concent 31.4





5/7/17 13:46

















Test


  5/7/17


13:46 5/7/17


13:52 5/7/17


13:54


 


White Blood Count


  13.51 K/uL


(4.8-10.8) 


  


 


 


Red Blood Count


  2.50 M/uL


(4.7-6.1) 


  


 


 


Hemoglobin


  6.9 g/dL


(14.0-18.0) 


  


 


 


Hematocrit 22.0 % (42-52)   


 


Mean Corpuscular Volume


  88.0 fL


() 


  


 


 


Mean Corpuscular Hemoglobin


  27.6 pg


(25-34) 


  


 


 


Mean Corpuscular Hemoglobin


Concent 31.4 g/dl


(32-36) 


  


 


 


Platelet Count


  67 K/uL


(130-400) 


  


 


 


RDW Standard Deviation


  64.8 fL


(36.4-46.3) 


  


 


 


RDW Coefficient of Variation


  20.2 %


(11.5-14.5) 


  


 


 


Neutrophils % (Manual) 75.5 %   


 


Lymphocytes % (Manual) 7.6 %   


 


Monocytes % (Manual) 16.1 %   


 


Eosinophils % (Manual) 0.8 %   


 


Neutrophils # (Manual)


  10.20 K/uL


(1.4-6.5) 


  


 


 


Total Absolute Neutrophils


  10.20 K/uL


(1.4-6.5) 


  


 


 


Lymphocytes # (Manual)


  1.03 K/uL


(1.2-3.4) 


  


 


 


Total Absolute Lymphocytes


  1.03 K/uL


(1.2-3.4) 


  


 


 


Monocytes # (Manual)


  2.18 K/uL


(0.11-0.59) 


  


 


 


Eosinophils # (Manual)


  0.11 K/uL


(0-0.5) 


  


 


 


Platelet Estimate DECREASED   


 


Red Blood Cell Morphology Unremarkable   


 


Prothrombin Time


  11.6 SECONDS


(9.0-12.0) 


  


 


 


Prothromb Time International


Ratio 1.1 (0.9-1.1) 


  


  


 


 


Activated Partial


Thromboplast Time 23.6 SECONDS


(21.0-31.0) 


  


 


 


Partial Thromboplastin Ratio 0.9   


 


Est Creatinine Clear Calc


Drug Dose 23.4 ml/min 


  


  


 


 


Estimated GFR (


American) 23.5 


  


  


 


 


Estimated GFR (Non-


American 20.2 


  


  


 


 


BUN/Creatinine Ratio 19.2 (10-20)   


 


Calcium Level


  8.1 mg/dl


(8.5-10.1) 


  


 


 


Total Bilirubin


  0.3 mg/dl


(0.2-1) 


  


 


 


Direct Bilirubin


  < 0.1 mg/dl


(0-0.2) 


  


 


 


Aspartate Amino Transf


(AST/SGOT) 9 U/L (15-37) 


  


  


 


 


Alanine Aminotransferase


(ALT/SGPT) 14 U/L (12-78) 


  


  


 


 


Alkaline Phosphatase


  52 U/L


() 


  


 


 


Total Protein


  6.6 gm/dl


(6.4-8.2) 


  


 


 


Albumin


  3.1 gm/dl


(3.4-5.0) 


  


 


 


Lipase


  223 U/L


() 


  


 


 


Beta-Hydroxybutyric Acid


  1.29 mg/dL


(0.2-2.81) 


  


 


 


Bedside Hemoglobin


  


  7.1 g/dl


(14.0-18.0) 


 


 


Bedside Hematocrit  21 % (42-52)  


 


Bedside Sodium


  


  138 mEq/L


(135-144) 


 


 


Bedside Potassium


  


  4.9 mEq/L


(3.3-5.0) 


 


 


Bedside Chloride


  


  105 mEq/L


(101-112) 


 


 


Bedside Total CO2


  


  18 mEq/l


(24-31) 


 


 


Anion Gap


  


  22.0 mmol/L


(16-25) 


 


 


Bedside Blood Urea Nitrogen


  


  53 mg/dl


(7-18) 


 


 


Bedside Creatinine


  


  2.6 mg/dl


(0.6-1.3) 


 


 


Bedside Glucose (other)


  


  334 mg/dl


(70-99) 


 


 


Bedside Ionized Calcium (Najma)


  


  1.11 mmol/l


(1.12-1.32) 


 


 


Bedside Troponin I


  


  


  0.000 ng/ml


(0-0.045)





Laboratory results as reviewed by me.





Medications Administered











 Medications


  (Trade)  Dose


 Ordered  Sig/Mariposa


 Route  Start Time


 Stop Time Status Last Admin


Dose Admin


 


 Sodium Chloride


  (Nss 500ml)  500 ml @ 


 999 mls/hr  Q31M STAT


 IV  5/7/17 13:23


 5/7/17 13:53 DC 5/7/17 13:39


999 MLS/HR


 


 Pantoprazole


 Sodium 1 ea  1 ea  NOW  STAT


 IV  5/7/17 13:23


 5/7/17 13:26 DC 5/7/17 14:00


1 EA


 


 Pantoprazole


 Sodium 80 mg/


 Dextrose  120 ml @ 


 480 mls/hr  TODAY@1345


 IV  5/7/17 13:45


 5/7/17 13:59 DC 5/7/17 13:57


480 MLS/HR


 


 Pantoprazole


 Sodium/Dextrose


  (Protonix Inj/D5


 100ml)  100 ml @ 


 20 mls/hr  Q5H


 IV  5/7/17 14:00


 5/7/17 18:59  5/7/17 14:16


20 MLS/HR











ECG


Indication:  SOB/dyspnea, weakness


Rate (beats per minute):  57


Rhythm:  sinus bradycardia


Findings:  1st degree AV block, no ectopy





ED Course


1318: The patient was evaluated in room A9. A complete history and physical 

exam was performed.





1323: Ordered Protonix IV Bolus/Drip IV, Sodium Chloride 500 ml @ 999 mls/hr IV.





1334:  I checked in on the patient. He is resting. He is still hypotensive. A 

second line is being established. I obtained consent for blood transfusions 

from the patient. 





1345: Ordered Pantoprazole Sodium 80 mg/Dextrose 120 mL @ 480 mL/hr IV. 





1358: I conducted a rectal exam. See the physical exam for findings. He is 

still hypotensive and waiting for transfusion





1413: The patient's blood pressure is 115/51, waiting for blood from the blood 

bank. 





1436: The patient is normotensive awaiting blood transfusion. 





1512: I reassessed the patient. He has no complaints. His blood pressure is 116/

71. The blood bank informed me that there would be a delay in the transfusion 

because the patient has some antibodies. 





1539: I was informed that the patient will be transfused in about an hour.





Medical Decision


This is an 81-year-old male who presents with dark stools and shortness of 

breath.  Differential diagnosis includes anemia, GI bleed, peptic ulcer disease

, cardiac, pneumonia.  I did perform a limited focused review of portions of 

the patient's old chart on the electronic medical record. The patient had blood 

work February 13th of 2017 which showed a hemoglobin of 10 and a creatinine of 

2.1. He was admitted for a GI bleed may of 2016 with a normal EDG and 

colonoscopy. 





I did evaluate the patient as noted above.  IV access was established.  The 

patient was placed on a continuous cardiac monitor.  He is hypotensive and was 

immediately given an IV bolus of normal saline.  Rectal examination shows 

guaiac positive black stools.  I did order a type and cross for the patient.  I 

did order 2 units of packed RBCs to be immediately given.  I did obtain written 

and verbal consent from the patient for blood transfusion.  I did order and 

personally review the patient's 12-lead EKG and chest x-ray as described above.

  I did order and review the patient's blood work as noted in the electronic 

medical record.  He is severely anemic.  I did reassess the patient multiple 

times.  His blood pressure did improve.  I did discuss the case with the 

[facility] .





Consults


Time Called:  1402


Consulting Physician:  Dr. Powell -ADRIENNE


Returned Call:  1406


I spoke with Dr. Powell of [facility] [place] We discussed the patient and his results. 

The patient will be further evaluated by ADRIENNE.





Impression





 Primary Impression:  


 Acute GI bleeding


 Additional Impressions:  


 Symptomatic anemia


 Hypotension





Critical Care


I have personally spent 38 minutes of critical care time in the direct 

management of this patient.  This includes bedside care, interpretation of 

diagnostic studies, and testing, discussion with consultants, patient, and 

family members, and other required patient management activities.  This 38 

minutes is in excess of all separately billable procedures.





Scribe Attestation


The scribe's documentation has been prepared under my direct and personally 

reviewed by me in its entirety. I confirm that the note above accurately 

reflects all work, treatment, procedures, and medical decision making performed 

by me.





Departure Information


Dispostion


Being Evaluated By Hospitalist





Referrals


Isai Lucas M.D. (PCP)





Patient Instructions


My [facility]





Problem Qualifiers








 Additional Impressions:  


 Hypotension


 Hypotension type:  unspecified hypotension type  Qualified Codes:  I95.9 - 

Hypotension, unspecified

## 2017-05-08 VITALS
TEMPERATURE: 97.88 F | HEART RATE: 70 BPM | OXYGEN SATURATION: 97 % | DIASTOLIC BLOOD PRESSURE: 63 MMHG | SYSTOLIC BLOOD PRESSURE: 122 MMHG

## 2017-05-08 VITALS
SYSTOLIC BLOOD PRESSURE: 148 MMHG | HEART RATE: 64 BPM | DIASTOLIC BLOOD PRESSURE: 71 MMHG | TEMPERATURE: 97.88 F | OXYGEN SATURATION: 96 %

## 2017-05-08 VITALS
HEART RATE: 70 BPM | SYSTOLIC BLOOD PRESSURE: 93 MMHG | TEMPERATURE: 97.88 F | OXYGEN SATURATION: 97 % | DIASTOLIC BLOOD PRESSURE: 49 MMHG

## 2017-05-08 VITALS
SYSTOLIC BLOOD PRESSURE: 112 MMHG | HEART RATE: 64 BPM | DIASTOLIC BLOOD PRESSURE: 64 MMHG | TEMPERATURE: 97.88 F | OXYGEN SATURATION: 96 %

## 2017-05-08 VITALS — TEMPERATURE: 98.6 F | HEART RATE: 53 BPM

## 2017-05-08 VITALS
TEMPERATURE: 97.88 F | OXYGEN SATURATION: 97 % | DIASTOLIC BLOOD PRESSURE: 78 MMHG | SYSTOLIC BLOOD PRESSURE: 158 MMHG | HEART RATE: 61 BPM

## 2017-05-08 VITALS
DIASTOLIC BLOOD PRESSURE: 68 MMHG | HEART RATE: 66 BPM | TEMPERATURE: 97.88 F | SYSTOLIC BLOOD PRESSURE: 125 MMHG | OXYGEN SATURATION: 92 %

## 2017-05-08 VITALS — OXYGEN SATURATION: 95 %

## 2017-05-08 VITALS — OXYGEN SATURATION: 96 %

## 2017-05-08 LAB
ANION GAP SERPL CALC-SCNC: 7 MMOL/L (ref 3–11)
ANISOCYTOSIS BLD QL SMEAR: PRESENT
APPEARANCE UR: CLEAR
BASOPHILS # BLD: 0.16 K/UL (ref 0–0.2)
BASOPHILS NFR BLD: 0.9 % (ref 0–2)
BILIRUB UR-MCNC: (no result) MG/DL
BUN SERPL-MCNC: 45 MG/DL (ref 7–18)
BUN/CREAT SERPL: 20.6 (ref 10–20)
CALCIUM SERPL-MCNC: 7.8 MG/DL (ref 8.5–10.1)
CHLORIDE SERPL-SCNC: 114 MMOL/L (ref 98–107)
CO2 SERPL-SCNC: 23 MMOL/L (ref 21–32)
COLOR UR: YELLOW
COMPLETE: YES
CREAT CL PREDICTED SERPL C-G-VRATE: 27.2 ML/MIN
CREAT SERPL-MCNC: 2.2 MG/DL (ref 0.6–1.4)
EOSINOPHIL NFR BLD AUTO: 41 K/UL (ref 130–400)
GLUCOSE SERPL-MCNC: 110 MG/DL (ref 70–99)
HCT VFR BLD CALC: 25.1 % (ref 42–52)
HCT VFR BLD CALC: 25.2 % (ref 42–52)
HCT VFR BLD CALC: 25.4 % (ref 42–52)
HCT VFR BLD CALC: 27.1 % (ref 42–52)
LYMPHOCYTES # BLD: 0.45 K/UL (ref 1.2–3.4)
LYMPHOCYTES NFR BLD: 2.6 %
MANUAL MICROSCOPIC REQUIRED?: NO
MCH RBC QN AUTO: 27.6 PG (ref 25–34)
MCHC RBC AUTO-ENTMCNC: 32.1 G/DL (ref 32–36)
MCV RBC AUTO: 85.7 FL (ref 80–100)
NEUTROPHILS NFR BLD AUTO: 76.3 %
NITRITE UR QL STRIP: (no result)
PH UR STRIP: 5 [PH] (ref 4.5–7.5)
PMV BLD AUTO: 10.8 FL (ref 7.4–10.4)
POTASSIUM SERPL-SCNC: 4.4 MMOL/L (ref 3.5–5.1)
RBC # BLD AUTO: 2.94 M/UL (ref 4.7–6.1)
REVIEW REQ?: NO
SODIUM SERPL-SCNC: 144 MMOL/L (ref 136–145)
SP GR UR STRIP: 1.02 (ref 1–1.03)
URINE BILL WITH OR WITHOUT MIC: (no result)
UROBILINOGEN UR-MCNC: (no result) MG/DL
WBC # BLD AUTO: 17.28 K/UL (ref 4.8–10.8)

## 2017-05-08 PROCEDURE — 0DB68ZX EXCISION OF STOMACH, VIA NATURAL OR ARTIFICIAL OPENING ENDOSCOPIC, DIAGNOSTIC: ICD-10-PCS | Performed by: INTERNAL MEDICINE

## 2017-05-08 PROCEDURE — 0W3P8ZZ CONTROL BLEEDING IN GASTROINTESTINAL TRACT, VIA NATURAL OR ARTIFICIAL OPENING ENDOSCOPIC: ICD-10-PCS | Performed by: INTERNAL MEDICINE

## 2017-05-08 RX ADMIN — PANTOPRAZOLE SODIUM SCH MLS/HR: 40 INJECTION, POWDER, FOR SOLUTION INTRAVENOUS at 01:36

## 2017-05-08 RX ADMIN — INSULIN GLARGINE SCH UNIT: 100 INJECTION, SOLUTION SUBCUTANEOUS at 14:19

## 2017-05-08 RX ADMIN — INSULIN ASPART SCH UNITS: 100 INJECTION, SOLUTION INTRAVENOUS; SUBCUTANEOUS at 00:00

## 2017-05-08 RX ADMIN — PANTOPRAZOLE SCH MG: 40 TABLET, DELAYED RELEASE ORAL at 21:54

## 2017-05-08 RX ADMIN — Medication SCH MCG: at 09:00

## 2017-05-08 RX ADMIN — INSULIN ASPART SCH UNITS: 100 INJECTION, SOLUTION INTRAVENOUS; SUBCUTANEOUS at 21:57

## 2017-05-08 RX ADMIN — PANTOPRAZOLE SODIUM SCH MLS/HR: 40 INJECTION, POWDER, FOR SOLUTION INTRAVENOUS at 12:11

## 2017-05-08 RX ADMIN — PANTOPRAZOLE SODIUM SCH MLS/HR: 40 INJECTION, POWDER, FOR SOLUTION INTRAVENOUS at 06:00

## 2017-05-08 RX ADMIN — SODIUM CHLORIDE SCH MLS/HR: 900 INJECTION, SOLUTION INTRAVENOUS at 00:31

## 2017-05-08 RX ADMIN — INSULIN ASPART SCH UNITS: 100 INJECTION, SOLUTION INTRAVENOUS; SUBCUTANEOUS at 16:49

## 2017-05-08 RX ADMIN — SODIUM CHLORIDE SCH MLS/HR: 900 INJECTION, SOLUTION INTRAVENOUS at 07:55

## 2017-05-08 RX ADMIN — INSULIN ASPART SCH UNITS: 100 INJECTION, SOLUTION INTRAVENOUS; SUBCUTANEOUS at 06:00

## 2017-05-08 RX ADMIN — INSULIN ASPART SCH UNITS: 100 INJECTION, SOLUTION INTRAVENOUS; SUBCUTANEOUS at 12:00

## 2017-05-08 NOTE — GASTROENTEROLOGY PROGRESS NOTE
Progress Note


Date of Service:  May 8, 2017


Subjective


Pt evaluation today including:  conversation w/ patient, physical exam, chart 

review, lab review, review of studies, review of inpatient medication list


CC f/u GI bleeding


HPI Pt denies abd pain. No stools since admission.





Review of Systems


Respiratory:  No shortness of breath


Cardiac:  No chest pain





Medications





 Current Inpatient Medications








 Medications


  (Trade)  Dose


 Ordered  Sig/Mariposa


 Route  Start Time


 Stop Time Status Last Admin


Dose Admin


 


 Sodium Chloride


  (Nss 1000ml)  1,000 ml @ 


 75 mls/hr  V76H12M


 IV  5/7/17 15:55


 6/7/17 15:54  5/8/17 07:55


125 MLS/HR


 


 Ondansetron HCl


  (Zofran Inj)  4 mg  Q6H  PRN


 IV  5/7/17 16:00


 6/6/17 15:59   


 


 


 Cyanocobalamin


  (Vitamin B-12


 Tab)  1,000 mcg  DAILY


 PO  5/8/17 09:00


 6/7/17 08:59   


 


 


 Nitroglycerin


  (Nitrostat Tab)  0.4 mg  UD  PRN


 SL  5/7/17 16:00


 6/6/17 15:59   


 


 


 Insulin Glargine


  (Lantus Solostar


 Pen)  14 unit  QAM


 SC  5/8/17 09:00


 6/7/17 08:59   


 


 


 Glucose


  (Glucose 40% Gel)  15-30


 GRAMS 15


 GRAMS...  UD  PRN


 PO  5/7/17 16:15


 6/6/17 16:14   


 


 


 Glucose


  (Glucose Chew


 Tab)  4-8


 Tablets 4


 Tabl...  UD  PRN


 PO  5/7/17 16:15


 6/6/17 16:14   


 


 


 Dextrose


  (Dextrose 50%


 50ML Syringe)  25-50ML OF


 50% DW IV


 FOR...  UD  PRN


 IV  5/7/17 16:15


 6/6/17 16:14   


 


 


 Glucagon


  (Glucagon Inj)  1 mg  UD  PRN


 SQ  5/7/17 16:15


 6/6/17 16:14   


 


 


 Insulin Aspart   SLIDING


 SCALE  Q6


 SC  5/7/17 18:00


 6/6/17 17:59  5/7/17 19:03


4 UNITS


 


 Pantoprazole


 Sodium/Dextrose


  (Protonix Inj/D5


 100ml)  100 ml @ 


 20 mls/hr  Q5H


 IV  5/7/17 21:00


 6/6/17 20:59  5/8/17 12:11


20 MLS/HR











Objective


Vital Signs











  Date Time  Temp Pulse Resp B/P Pulse Ox O2 Delivery O2 Flow Rate FiO2


 


5/8/17 12:24 36.7 82 20 158/70 96 Room Air  


 


5/8/17 12:00     95 Room Air  


 


5/8/17 11:46 37.0 53      


 


5/8/17 08:00     96 Room Air  


 


5/8/17 07:58 36.6 64 20 112/64 96 Room Air  


 


5/8/17 04:54 36.6 70 17 122/63 97 Room Air  


 


5/8/17 04:01 36.6 70 17 122/63 97 Room Air  


 


5/8/17 04:00      Room Air  


 


5/7/17 23:59      Room Air  


 


5/7/17 23:42 36.6 65 18 103/58 95   


 


5/7/17 22:05  67 18 105/58 98   


 


5/7/17 21:35 36.7 70 18 103/62 95   


 


5/7/17 21:17 36.5 74 18 157/66 99   


 


5/7/17 20:50 36.8 69 16 130/60 96   


 


5/7/17 20:00      Room Air  


 


5/7/17 19:50  60 16 151/71 97   


 


5/7/17 18:50 36.3 57 16 122/69 95   


 


5/7/17 18:15 36.5 58 12 152/70 96   


 


5/7/17 18:10 36.4 58 16 149/72 97   


 


5/7/17 17:47 36.7 62 22 146/67 98 Room Air  


 


5/7/17 16:30 36.4 62 20 139/68 100 Room Air  


 


5/7/17 16:12      Room Air  


 


5/7/17 15:11  61 20 120/53 99 Room Air  


 


5/7/17 14:18  58 18 95/47 98 Room Air  


 


5/7/17 14:00  67  114/51    


 


5/7/17 13:45  56  81/41    


 


5/7/17 13:38  57 24 96/45 98 Room Air  


 


5/7/17 13:27  58      


 


5/7/17 13:22     95 Room Air  


 


5/7/17 13:22  58 18 97/58 99 Room Air  


 


5/7/17 13:14 36.4 93 24 77/46 100 Room Air  











Physical Exam


General Appearance:  WD/WN, no apparent distress


Respiratory/Chest:  lungs clear, no respiratory distress


Cardiovascular:  regular rate, rhythm


Abdomen:  normal bowel sounds, non tender, soft


Neurologic/Psych:  normal mood/affect, oriented x 3





Laboratory Results





Last 24 Hours








Test


  5/7/17


13:46 5/7/17


13:52 5/7/17


13:54 5/7/17


16:26


 


White Blood Count 13.51 K/uL    


 


Red Blood Count 2.50 M/uL    


 


Hemoglobin 6.9 g/dL    6.7 g/dL 


 


Hematocrit 22.0 %    21.2 % 


 


Mean Corpuscular Volume 88.0 fL    


 


Mean Corpuscular Hemoglobin 27.6 pg    


 


Mean Corpuscular Hemoglobin


Concent 31.4 g/dl 


  


  


  


 


 


Platelet Count 67 K/uL    


 


RDW Standard Deviation 64.8 fL    


 


RDW Coefficient of Variation 20.2 %    


 


Neutrophils % (Manual) 75.5 %    


 


Lymphocytes % (Manual) 7.6 %    


 


Monocytes % (Manual) 16.1 %    


 


Eosinophils % (Manual) 0.8 %    


 


Neutrophils # (Manual) 10.20 K/uL    


 


Total Absolute Neutrophils 10.20 K/uL    


 


Lymphocytes # (Manual) 1.03 K/uL    


 


Total Absolute Lymphocytes 1.03 K/uL    


 


Monocytes # (Manual) 2.18 K/uL    


 


Eosinophils # (Manual) 0.11 K/uL    


 


Platelet Estimate DECREASED    


 


Red Blood Cell Morphology Unremarkable    


 


Prothrombin Time 11.6 SECONDS    


 


Prothromb Time International


Ratio 1.1 


  


  


  


 


 


Activated Partial


Thromboplast Time 23.6 SECONDS 


  


  


  


 


 


Partial Thromboplastin Ratio 0.9    


 


Sodium Level 141 mmol/L    


 


Potassium Level 4.8 mmol/L    


 


Chloride Level 107 mmol/L    


 


Carbon Dioxide Level 20 mmol/L    


 


Anion Gap 14.0 mmol/L  22.0 mmol/L   


 


Blood Urea Nitrogen 54 mg/dl    


 


Creatinine 2.80 mg/dl    


 


Est Creatinine Clear Calc


Drug Dose 23.4 ml/min 


  


  


  


 


 


Estimated GFR (


American) 23.5 


  


  


  


 


 


Estimated GFR (Non-


American 20.2 


  


  


  


 


 


BUN/Creatinine Ratio 19.2    


 


Random Glucose 340 mg/dl    


 


Calcium Level 8.1 mg/dl    


 


Total Bilirubin 0.3 mg/dl    


 


Direct Bilirubin < 0.1 mg/dl    


 


Aspartate Amino Transf


(AST/SGOT) 9 U/L 


  


  


  


 


 


Alanine Aminotransferase


(ALT/SGPT) 14 U/L 


  


  


  


 


 


Alkaline Phosphatase 52 U/L    


 


Total Protein 6.6 gm/dl    


 


Albumin 3.1 gm/dl    


 


Lipase 223 U/L    


 


Beta-Hydroxybutyric Acid 1.29 mg/dL    


 


Bedside Hemoglobin  7.1 g/dl   


 


Bedside Hematocrit  21 %   


 


Bedside Sodium  138 mEq/L   


 


Bedside Potassium  4.9 mEq/L   


 


Bedside Chloride  105 mEq/L   


 


Bedside Total CO2  18 mEq/l   


 


Bedside Blood Urea Nitrogen  53 mg/dl   


 


Bedside Creatinine  2.6 mg/dl   


 


Bedside Glucose (other)  334 mg/dl   


 


Bedside Ionized Calcium (Najma)  1.11 mmol/l   


 


Bedside Troponin I   0.000 ng/ml  














Test


  5/7/17


16:30 5/7/17


17:24 5/7/17


18:17 5/8/17


00:00


 


Activated Partial


Thromboplast Time 23.1 SECONDS 


  


  


  


 


 


Partial Thromboplastin Ratio 0.9    


 


Bedside Glucose  245 mg/dl  260 mg/dl  111 mg/dl 














Test


  5/8/17


01:12 5/8/17


03:45 5/8/17


06:00 5/8/17


09:00


 


Hemoglobin 8.8 g/dL  8.3 g/dL   8.1 g/dL 


 


Hematocrit 27.1 %  25.1 %   25.2 % 


 


Bedside Glucose   105 mg/dl  


 


White Blood Count    17.28 K/uL 


 


Red Blood Count    2.94 M/uL 


 


Mean Corpuscular Volume    85.7 fL 


 


Mean Corpuscular Hemoglobin    27.6 pg 


 


Mean Corpuscular Hemoglobin


Concent 


  


  


  32.1 g/dl 


 


 


Platelet Count    41 K/uL 


 


Mean Platelet Volume    10.8 fL 


 


RDW Standard Deviation    58.0 fL 


 


RDW Coefficient of Variation    19.1 % 


 


Neutrophils % (Manual)    76.3 % 


 


Lymphocytes % (Manual)    2.6 % 


 


Monocytes % (Manual)    20.2 % 


 


Basophils % (Manual)    0.9 % 


 


Neutrophils # (Manual)    13.18 K/uL 


 


Total Absolute Neutrophils    13.18 K/uL 


 


Lymphocytes # (Manual)    0.45 K/uL 


 


Total Absolute Lymphocytes    0.45 K/uL 


 


Monocytes # (Manual)    3.49 K/uL 


 


Basophils # (Manual)    0.16 K/uL 


 


Hypogranular Neutrophils    1+ 


 


Anisocytosis    PRESENT 


 


Sodium Level    144 mmol/L 


 


Potassium Level    4.4 mmol/L 


 


Chloride Level    114 mmol/L 


 


Carbon Dioxide Level    23 mmol/L 


 


Anion Gap    7.0 mmol/L 


 


Blood Urea Nitrogen    45 mg/dl 


 


Creatinine    2.20 mg/dl 


 


Est Creatinine Clear Calc


Drug Dose 


  


  


  27.2 ml/min 


 


 


Estimated GFR (


American) 


  


  


  31.4 


 


 


Estimated GFR (Non-


American 


  


  


  27.1 


 


 


BUN/Creatinine Ratio    20.6 


 


Random Glucose    110 mg/dl 


 


Calcium Level    7.8 mg/dl 














Test


  5/8/17


11:16 5/8/17


12:06 5/8/17


12:18 


 


 


Bedside Glucose 123 mg/dl    


 


Hemoglobin  8.1 g/dL   


 


Hematocrit  25.4 %   











Assessment and Plan








GI bleeding---5/2016 admit with EGD /colo then outpt capsule negative--dark 

stool suggests UGI source so plan EGD today.  Procedure and risks explained to 

patient and wife which include but not limited to medication reaction, bleeding

, perforation, aspiration. 








acute blood loss anemia---transfuse prn--Hgb incremented appropriately post 2 

units transfused then drift down but no stools to indicate active bleeding.  





thrombocytopenia--low but should be adequate to prevent spontaneous GI bleeding

## 2017-05-08 NOTE — ANESTHESIOLOGY PROGRESS NOTE
Anesthesia Post Op Note


Date & Time


May 8, 2017 at 16:05





Vital Signs


Pain Intensity:  0.0





 Vital Signs Past 12 Hours








  Date Time  Temp Pulse Resp B/P Pulse Ox O2 Delivery O2 Flow Rate FiO2


 


5/8/17 13:44  55 20 132/53 96 Room Air 0 


 


5/8/17 13:29  62 20 134/53 97 Room Air 0 


 


5/8/17 13:13  60 20 118/46 96 Nasal Cannula 10 


 


5/8/17 12:24 36.7 82 20 158/70 96 Room Air  


 


5/8/17 12:00     95 Room Air  


 


5/8/17 11:46 37.0 53      


 


5/8/17 08:00     96 Room Air  


 


5/8/17 07:58 36.6 64 20 112/64 96 Room Air  


 


5/8/17 04:54 36.6 70 17 122/63 97 Room Air  











Notes


Mental Status:  alert / awake / arousable, participated in evaluation


Pt Amnestic to Procedure:  Yes


Nausea / Vomiting:  adequately controlled


Pain:  adequately controlled


Airway Patency, RR, SpO2:  stable & adequate


BP & HR:  stable & adequate


Hydration State:  stable & adequate


Anesthetic Complications:  no major complications apparent

## 2017-05-08 NOTE — GI REPORT
Procedure Date: 5/8/2017 12:56 PM

Procedure:            Upper GI endoscopy

Indications:          Melena

Medicines:            Monitored Anesthesia Care

Complications:        No immediate complications.

Estimated Blood Loss: Estimated blood loss was minimal.

Procedure:            Pre-Anesthesia Assessment:

                      - Patient identification and proposed procedure were 

                      verified prior to the procedure by the physician, the 

                      nurse, the anesthetist and the technician. The 

                      procedure was verified in the procedure room.

                      After obtaining informed consent, the endoscope was 

                      passed under direct vision. Throughout the procedure, 

                      the patient's blood pressure, pulse, and oxygen 

                      saturations were monitored continuously. The Scope was 

                      introduced through the mouth, and advanced to the 

                      second part of duodenum. The upper GI endoscopy was 

                      accomplished without difficulty. The patient tolerated 

                      the procedure well. Procedure and risks explained to 

                      patient which include but not limited to medication 

                      reaction, bleeding, perforation, aspiration , and 

                      missed lesions. Judicious gas insufflation was used and 

                      gas removal done on the way out. The lumen was always 

                      visualized when advancing the scope. Prep was good. 

                      Washes and suctioning used as needed to get good 

                      visualization of the mucosa. Retroflexion to look at 

                      the fundus and cardia of the stomach and GE junction 

                      was done.

Findings:

     A 5 cm hiatus hernia was present.

     A mild Schatzki ring (acquired) was found at the gastroesophageal 

     junction (at 38 cm).

     A single small non bleeding angioectasia was found in the gastric body. 

     Coagulation for bleeding prevention using argon plasma was successful. 

     Estimated blood loss: none.

     The gastric antrum was normal. Biopsies were taken with a cold forceps 

     for Helicobacter pylori testing. Estimated blood loss was minimal.

     A few localized erosions without bleeding were found in the second part 

     of the duodenum.

     No fresh nor old blood noted except post biopsy.

     The exam was otherwise without abnormality.

Impression:           - 5 cm hiatus hernia.

                      - Mild Schatzki ring.

                      - A single non-bleeding angioectasia in the stomach. 

                      Treated with argon plasma coagulation (APC) in case 

                      this was a source of bleeding.

                      - Normal antrum. Biopsied.

                      - Duodenal erosions without bleeding.

                      - No fresh nor old blood noted except post biopsy.

                      - The examination was otherwise normal.

Recommendation:       - [facility].

                      - Resume solid diet.

                      Laxative to purge gut.

                      Tagged RBC scan if has ongoing bleeding.

Suhas Silverio M.D.

Suhas Silverio MD

5/8/2017 1:33:22 PM

This report has been signed electronically.

Note Initiated On: 5/8/2017 12:56 PM

     I attest to the content of the Intraoperative Record and orders 

     documented therein, exceptions below

## 2017-05-08 NOTE — FAMILY MEDICINE PROGRESS NOTE
Progress Note


Date of Service


May 8, 2017.





Subjective


Pt evaluation today including:  conversation w/ patient, physical exam, chart 

review, lab review


Patient feeling well currently, he denies any pain or discomfort. He does state 

that he has felt dizzy/lightheaded over the last few days, but that it has 

improved since admission. No headaches, syncope or presyncopal episodes. Family 

concerned about frequency of episodes of anemia requiring hospitalization and 

transfusion (last time similar symptoms occurred was a few months ago) and are 

keen to determine etiology.





   Constitutional:  No chills, No fever


   Respiratory:  No cough, No wheezing


   Cardiovascular:  No chest pain, No edema, No palpitations


   Abdomen:  + GI bleeding (dark tarry stools), No constipation, No diarrhea, 

No nausea, No pain, No vomiting


   Male :  No dysuria, No hematuria





Objective


Vital Signs











  Date Time  Temp Pulse Resp B/P Pulse Ox O2 Delivery O2 Flow Rate FiO2


 


5/8/17 07:58 36.6 64 20 112/64 96 Room Air  


 


5/8/17 04:54 36.6 70 17 122/63 97 Room Air  


 


5/8/17 04:01 36.6 70 17 122/63 97 Room Air  


 


5/8/17 04:00      Room Air  


 


5/7/17 23:59      Room Air  


 


5/7/17 23:42 36.6 65 18 103/58 95   


 


5/7/17 22:05  67 18 105/58 98   


 


5/7/17 21:35 36.7 70 18 103/62 95   


 


5/7/17 21:17 36.5 74 18 157/66 99   


 


5/7/17 20:50 36.8 69 16 130/60 96   


 


5/7/17 20:00      Room Air  


 


5/7/17 19:50  60 16 151/71 97   


 


5/7/17 18:50 36.3 57 16 122/69 95   


 


5/7/17 18:15 36.5 58 12 152/70 96   


 


5/7/17 18:10 36.4 58 16 149/72 97   


 


5/7/17 17:47 36.7 62 22 146/67 98 Room Air  


 


5/7/17 16:30 36.4 62 20 139/68 100 Room Air  


 


5/7/17 16:12      Room Air  


 


5/7/17 15:11  61 20 120/53 99 Room Air  


 


5/7/17 14:18  58 18 95/47 98 Room Air  


 


5/7/17 14:00  67  114/51    


 


5/7/17 13:45  56  81/41    


 


5/7/17 13:38  57 24 96/45 98 Room Air  


 


5/7/17 13:27  58      


 


5/7/17 13:22     95 Room Air  


 


5/7/17 13:22  58 18 97/58 99 Room Air  


 


5/7/17 13:14 36.4 93 24 77/46 100 Room Air  











Physical Exam


General Appearance:  WD/WN, no apparent distress


Eyes:  + abnormal sclerae exam (some conjunctival pallor)


ENT:  hearing grossly normal, pharynx normal


Neck:  supple


Respiratory/Chest:  lungs clear, normal breath sounds, no respiratory distress, 

no accessory muscle use


Cardiovascular:  regular rate, rhythm, no murmur, + normal peripheral pulses


Abdomen:  normal bowel sounds, non tender, soft


Extremities:  normal inspection, no pedal edema, no calf tenderness


Neurologic/Psychiatric:  alert, normal mood/affect, oriented x 3


Skin:  normal color, warm/dry, no rash





Laboratory Results





Results Past 24 Hours








Test


  5/8/17


00:00 5/8/17


01:12 5/8/17


03:45 5/8/17


06:00 Range/Units


 


 


Bedside Glucose 111   105 70-99  mg/dl


 


Hemoglobin  8.8 8.3  14.0-18.0  g/dL


 


Hematocrit  27.1 25.1  42-52  %














Test


  5/8/17


09:00 5/8/17


11:16 5/8/17


12:06 5/8/17


12:15 Range/Units


 


 


White Blood Count 17.28    4.8-10.8  K/uL


 


Red Blood Count 2.94    4.7-6.1  M/uL


 


Hemoglobin 8.1  8.1  14.0-18.0  g/dL


 


Hematocrit 25.2  25.4  42-52  %


 


Mean Corpuscular Volume 85.7      fL


 


Mean Corpuscular Hemoglobin 27.6    25-34  pg


 


Mean Corpuscular Hemoglobin


Concent 32.1


  


  


  


  32-36  g/dl


 


 


Platelet Count 41    130-400  K/uL


 


Mean Platelet Volume 10.8    7.4-10.4  fL


 


RDW Standard Deviation 58.0    36.4-46.3  fL


 


RDW Coefficient of Variation 19.1    11.5-14.5  %


 


Neutrophils % (Manual) 76.3     %


 


Lymphocytes % (Manual) 2.6     %


 


Monocytes % (Manual) 20.2     %


 


Basophils % (Manual) 0.9    0-2  %


 


Neutrophils # (Manual) 13.18    1.4-6.5  K/uL


 


Total Absolute Neutrophils 13.18    1.4-6.5  K/uL


 


Lymphocytes # (Manual) 0.45    1.2-3.4  K/uL


 


Total Absolute Lymphocytes 0.45    1.2-3.4  K/uL


 


Monocytes # (Manual) 3.49    0.11-0.59  K/uL


 


Basophils # (Manual) 0.16    0-0.2  K/uL


 


Hypogranular Neutrophils 1+     


 


Anisocytosis PRESENT     


 


Sodium Level 144    136-145  mmol/L


 


Potassium Level 4.4    3.5-5.1  mmol/L


 


Chloride Level 114      mmol/L


 


Carbon Dioxide Level 23    21-32  mmol/L


 


Anion Gap 7.0    3-11  mmol/L


 


Blood Urea Nitrogen 45    7-18  mg/dl


 


Creatinine


  2.20


  


  


  


  0.60-1.40


mg/dl


 


Est Creatinine Clear Calc


Drug Dose 27.2


  


  


  


   ml/min


 


 


Estimated GFR (


American) 31.4


  


  


  


   


 


 


Estimated GFR (Non-


American 27.1


  


  


  


   


 


 


BUN/Creatinine Ratio 20.6    10-20  


 


Random Glucose 110    70-99  mg/dl


 


Calcium Level 7.8    8.5-10.1  mg/dl


 


Bedside Glucose  123   70-99  mg/dl


 


Urine Color    YELLOW  


 


Urine Appearance    CLEAR CLEAR  


 


Urine pH    5.0 4.5-7.5  


 


Urine Specific Gravity    1.018 1.000-1.030  


 


Urine Protein    NEG NEG  


 


Urine Glucose (UA)    NEG NEG  


 


Urine Ketones    NEG NEG  


 


Urine Occult Blood    NEG NEG  


 


Urine Nitrite    NEG NEG  


 


Urine Bilirubin    NEG NEG  


 


Urine Urobilinogen    NEG NEG  


 


Urine Leukocyte Esterase    NEG NEG  














Test


  5/8/17


16:04 5/8/17


20:07 


  


  Range/Units


 


 


Bedside Glucose 224 255   70-99  mg/dl











Assessment and Plan


81 year old male presenting with severe anemia with melena and hypotension





Melena with acute blood loss anemia - Plavix held. Symptoms improved post bolus 

IV fluids and 2 unit pRBC. Negative GI evaluation in 5/16. GI consult- recs 

appreciated. Ongoing melena but H/H stabilized. EGD shows angiotelectasia in 

stomach and some duodenal erosions but no active bleeding. Argon coagulation 

applied. Biopsies for H.Pylori taken.


- IV maintenance fluids 75 ml/hr


- 2 unit PRBC on hold if needed


- Diet allowed


- Protonix Drip


- Recheck CBC, coags, BMP tomorrow AM





Hypotension - measured BPs improving


- mIV fluids reduced rate 75ml/hr in view of CKD IV


- Regular blood pressure monitoring


- Home Norvasc and Furosemide held





Thrombocytopenia with h/o IgG Anand MGUS - followed by [facility] hematology/

oncology. Previous history of low platelets, was on Plavix (held). Initial 

resuscitation with fluids and blood products, and monitoring of platelets


- Consider platelet replacement if platelet level continues to fall, or 

spontaneous bleeding/bruising





Leukocytosis - Likely secondary to acute stress given stable vitals. UA 

negative.


- Trend on CBC tomorrow





Diabetes - Lantus 10 units reduced while NPO


- Resume home dose of insulin if tolerating diet well





ZIGGY on CKD IV - Creatinine 2.8 on admission, now at baseline 2.2


- Monitor I/O


- Trend on BMP tomorrow





Hyperlipidemia


- Lipitor held, will resume tomorrow





DVT Prophylaxis


- SCDs





Resuscitation status


- Full Resuscitation


Continued [facility] stay due to:  multiple IV medications needed


Discharge planning:  home





Resident Tracking


Resident Involvement:  Resident Care Provided


Care Provided:  Adult Hospital Medicine





Reviewed:  Pt Seen/Exam by Me


History


Resident Physician Supervision Note:


I interviewed and examined the patient. Discussed with Dr. Rogel and agree with 

findings and plan as documented in the note. Any exceptions or clarifications 

are listed here: 





Had a dark stool today after the EGD but Hgb stable, BPs actually elevated. 

Eating and no abd pain.





Vitals reviewed


NAD


RRR no mgr


CTAB no wcr


+BS soft NT ND no HSM


Ext no edema





83 yo male with acute blood loss anemia from GI bleeding. EGD with possible 

previous source of bleeding from angioectasia in stomach and duodenal erosions. 


-continue to follow CBC, hold Plavix


-continue PPI


-may need another capsule endoscopy but will discuss with GI


-may need plts transfused if continued bleeding and <50k





Documented By:  Alfreda Manriquez

## 2017-05-09 VITALS
DIASTOLIC BLOOD PRESSURE: 60 MMHG | TEMPERATURE: 97.88 F | SYSTOLIC BLOOD PRESSURE: 140 MMHG | HEART RATE: 61 BPM | OXYGEN SATURATION: 98 %

## 2017-05-09 VITALS
HEART RATE: 58 BPM | TEMPERATURE: 97.88 F | OXYGEN SATURATION: 97 % | SYSTOLIC BLOOD PRESSURE: 173 MMHG | DIASTOLIC BLOOD PRESSURE: 66 MMHG

## 2017-05-09 VITALS
OXYGEN SATURATION: 97 % | DIASTOLIC BLOOD PRESSURE: 73 MMHG | SYSTOLIC BLOOD PRESSURE: 143 MMHG | HEART RATE: 65 BPM | TEMPERATURE: 98.06 F

## 2017-05-09 VITALS
TEMPERATURE: 97.52 F | SYSTOLIC BLOOD PRESSURE: 144 MMHG | DIASTOLIC BLOOD PRESSURE: 71 MMHG | OXYGEN SATURATION: 92 % | HEART RATE: 71 BPM

## 2017-05-09 VITALS
SYSTOLIC BLOOD PRESSURE: 154 MMHG | HEART RATE: 66 BPM | DIASTOLIC BLOOD PRESSURE: 69 MMHG | TEMPERATURE: 97.88 F | OXYGEN SATURATION: 97 %

## 2017-05-09 VITALS
TEMPERATURE: 97.88 F | OXYGEN SATURATION: 97 % | SYSTOLIC BLOOD PRESSURE: 154 MMHG | HEART RATE: 55 BPM | DIASTOLIC BLOOD PRESSURE: 71 MMHG

## 2017-05-09 VITALS
OXYGEN SATURATION: 97 % | SYSTOLIC BLOOD PRESSURE: 158 MMHG | TEMPERATURE: 97.7 F | HEART RATE: 64 BPM | DIASTOLIC BLOOD PRESSURE: 81 MMHG

## 2017-05-09 VITALS
OXYGEN SATURATION: 99 % | DIASTOLIC BLOOD PRESSURE: 66 MMHG | HEART RATE: 58 BPM | TEMPERATURE: 97.88 F | SYSTOLIC BLOOD PRESSURE: 172 MMHG

## 2017-05-09 VITALS
DIASTOLIC BLOOD PRESSURE: 59 MMHG | HEART RATE: 58 BPM | SYSTOLIC BLOOD PRESSURE: 107 MMHG | TEMPERATURE: 98.24 F | OXYGEN SATURATION: 96 %

## 2017-05-09 VITALS
TEMPERATURE: 97.7 F | OXYGEN SATURATION: 95 % | DIASTOLIC BLOOD PRESSURE: 75 MMHG | SYSTOLIC BLOOD PRESSURE: 166 MMHG | HEART RATE: 58 BPM

## 2017-05-09 VITALS
TEMPERATURE: 97.88 F | OXYGEN SATURATION: 97 % | DIASTOLIC BLOOD PRESSURE: 77 MMHG | SYSTOLIC BLOOD PRESSURE: 167 MMHG | HEART RATE: 60 BPM

## 2017-05-09 VITALS
SYSTOLIC BLOOD PRESSURE: 110 MMHG | TEMPERATURE: 98.42 F | HEART RATE: 66 BPM | OXYGEN SATURATION: 96 % | DIASTOLIC BLOOD PRESSURE: 60 MMHG

## 2017-05-09 VITALS
DIASTOLIC BLOOD PRESSURE: 78 MMHG | OXYGEN SATURATION: 96 % | SYSTOLIC BLOOD PRESSURE: 186 MMHG | TEMPERATURE: 97.7 F | HEART RATE: 62 BPM

## 2017-05-09 VITALS — OXYGEN SATURATION: 95 %

## 2017-05-09 VITALS — OXYGEN SATURATION: 96 %

## 2017-05-09 VITALS — OXYGEN SATURATION: 97 %

## 2017-05-09 LAB
ALBUMIN/GLOB SERPL: 0.8 {RATIO} (ref 0.9–2)
ALP SERPL-CCNC: 49 U/L (ref 45–117)
ALT SERPL-CCNC: 13 U/L (ref 12–78)
ANION GAP SERPL CALC-SCNC: 9 MMOL/L (ref 3–11)
ANISOCYTOSIS BLD QL SMEAR: PRESENT
AST SERPL-CCNC: 9 U/L (ref 15–37)
BASOPHILS # BLD: 0.01 K/UL (ref 0–0.2)
BASOPHILS NFR BLD: 0.1 %
BUN SERPL-MCNC: 45 MG/DL (ref 7–18)
BUN/CREAT SERPL: 20.5 (ref 10–20)
BURR CELLS BLD QL SMEAR: (no result)
CALCIUM SERPL-MCNC: 7.7 MG/DL (ref 8.5–10.1)
CHLORIDE SERPL-SCNC: 113 MMOL/L (ref 98–107)
CO2 SERPL-SCNC: 20 MMOL/L (ref 21–32)
COMPLETE: YES
CREAT CL PREDICTED SERPL C-G-VRATE: 26.7 ML/MIN
CREAT SERPL-MCNC: 2.2 MG/DL (ref 0.6–1.4)
EOSINOPHIL NFR BLD AUTO: 41 K/UL (ref 130–400)
EOSINOPHIL NFR BLD AUTO: 47 K/UL (ref 130–400)
EST. AVERAGE GLUCOSE BLD GHB EST-MCNC: 174 MG/DL
GLOBULIN SER-MCNC: 3.3 GM/DL (ref 2.5–4)
GLUCOSE SERPL-MCNC: 146 MG/DL (ref 70–99)
HCT VFR BLD CALC: 23.6 % (ref 42–52)
HCT VFR BLD CALC: 24 % (ref 42–52)
IG%: 0.9 %
IMM GRANULOCYTES NFR BLD AUTO: 17.4 %
INR PPP: 1.1 (ref 0.9–1.1)
LYMPHOCYTES # BLD: 1.54 K/UL (ref 1.2–3.4)
MCH RBC QN AUTO: 27.6 PG (ref 25–34)
MCH RBC QN AUTO: 28.1 PG (ref 25–34)
MCHC RBC AUTO-ENTMCNC: 31.8 G/DL (ref 32–36)
MCHC RBC AUTO-ENTMCNC: 32.5 G/DL (ref 32–36)
MCV RBC AUTO: 86.3 FL (ref 80–100)
MCV RBC AUTO: 86.8 FL (ref 80–100)
MONOCYTES NFR BLD: 37.2 %
NEUTROPHILS # BLD AUTO: 0.7 %
NEUTROPHILS NFR BLD AUTO: 43.7 %
NRBC BLD AUTO-RTO: 0.7 %
PARTIAL THROMBOPLASTIN RATIO: 0.9
PLATELET # BLD EST: (no result) 10*3/UL
PLATELET # BLD EST: (no result) 10*3/UL
POTASSIUM SERPL-SCNC: 4.4 MMOL/L (ref 3.5–5.1)
PROTHROMBIN TIME: 12.3 SECONDS (ref 9–12)
RBC # BLD AUTO: 2.72 M/UL (ref 4.7–6.1)
RBC # BLD AUTO: 2.78 M/UL (ref 4.7–6.1)
SODIUM SERPL-SCNC: 142 MMOL/L (ref 136–145)
WBC # BLD AUTO: 8.87 K/UL (ref 4.8–10.8)
WBC # BLD AUTO: 9.73 K/UL (ref 4.8–10.8)

## 2017-05-09 RX ADMIN — Medication SCH MCG: at 08:09

## 2017-05-09 RX ADMIN — INSULIN ASPART SCH UNITS: 100 INJECTION, SOLUTION INTRAVENOUS; SUBCUTANEOUS at 08:12

## 2017-05-09 RX ADMIN — INSULIN GLARGINE SCH UNIT: 100 INJECTION, SOLUTION SUBCUTANEOUS at 08:13

## 2017-05-09 RX ADMIN — INSULIN ASPART SCH UNITS: 100 INJECTION, SOLUTION INTRAVENOUS; SUBCUTANEOUS at 17:20

## 2017-05-09 RX ADMIN — INSULIN ASPART SCH UNITS: 100 INJECTION, SOLUTION INTRAVENOUS; SUBCUTANEOUS at 20:38

## 2017-05-09 RX ADMIN — PANTOPRAZOLE SCH MG: 40 TABLET, DELAYED RELEASE ORAL at 08:09

## 2017-05-09 RX ADMIN — PANTOPRAZOLE SCH MG: 40 TABLET, DELAYED RELEASE ORAL at 20:37

## 2017-05-09 RX ADMIN — INSULIN ASPART SCH UNITS: 100 INJECTION, SOLUTION INTRAVENOUS; SUBCUTANEOUS at 11:00

## 2017-05-09 NOTE — MEDICAL STUDENT: [facility]
Med Student Progress Note


Date of Service


May 9, 2017.





Subjective


Pt evaluation today including:  conversation w/ patient, chart review, lab 

review, review of studies, review of inpatient medication list


Patient slept well through the night and was feeling well this morning. 

Complains of generalized weakness, in addition to chest pain/tightness "once or 

twice" through the night that relieved either on its own or by leaning forward.

  He states this happens at home as well, and takes sublingual nitroglycerin 

tablets for relief.  Denies headache, denies vision or hearing changes, and 

denies abdominal pain.  He was persistent in his desire to figure out what was 

wrong so he can go home.  Family came in toward the end of the visit and any 

concerns/questions were addressed or reassurance was given that they would be 

addressed soon.





Review of Systems


Constitutional:  + weakness, No fever


ENT:  + tinnitus, No trouble swallowing


Respiratory:  No dyspnea at rest, No dyspnea on exertion


Cardiac:  + chest pain ("once or twice" through the night.  Relieved when he 

takes nitro sublingual at home, other times "leans forward" to relieve 

tightness and discomfort)


Abdomen:  + GI bleeding (dark, tarry black stool), No constipation, No diarrhea

, No nausea, No pain, No vomiting


Male :  No dysuria


Neurologic:  + weakness





Objective


Vital Signs











  Date Time  Temp Pulse Resp B/P Pulse Ox O2 Delivery O2 Flow Rate FiO2


 


5/9/17 12:02      Room Air  


 


5/9/17 11:56 36.9 66 18 110/60 96   


 


5/9/17 08:06      Room Air  


 


5/9/17 07:54 36.8 58 18 107/59 96   


 


5/9/17 05:59      Room Air  


 


5/9/17 03:45 36.4 71 20 144/71 92 Room Air  


 


5/8/17 23:49 36.6 66 19 125/68 92 Room Air  


 


5/8/17 23:18      Room Air  


 


5/8/17 20:00      Room Air  


 


5/8/17 19:11 36.6 64 20 148/71 96 Room Air  


 


5/8/17 16:05 36.6 61 18 158/78 97 Room Air  


 


5/8/17 16:00      Room Air  


 


5/8/17 13:44  55 20 132/53 96 Room Air 0 


 


5/8/17 13:29  62 20 134/53 97 Room Air 0 











Physical Exam


General Appearance:  WD/WN, no apparent distress


Eyes:  right eye abnormal pupil (ellipse shaped pupil in right eye, patient 

states he received silicon injection for his retina), bilateral eyes EOMI, 

bilateral eyes PERRL


ENT:  normal ENT inspection, hearing grossly normal, pharynx normal


Respiratory/Chest:  lungs clear, normal breath sounds, no respiratory distress


Cardiovascular:  regular rate, rhythm, no gallop, no murmur, + normal 

peripheral pulses


Abdomen:  normal bowel sounds, non tender, soft


Neurologic/Psychiatric:  CNs II-XII nml as tested, alert, normal mood/affect, 

oriented x 3


Skin:  normal color, warm/dry





Laboratory Results





Last 24 Hours








Test


  5/8/17


16:04 5/8/17


20:07 5/9/17


06:00 5/9/17


06:52


 


Bedside Glucose 224 mg/dl  255 mg/dl   156 mg/dl 


 


White Blood Count   8.87 K/uL  


 


Red Blood Count   2.78 M/uL  


 


Hemoglobin   7.8 g/dL  


 


Hematocrit   24.0 %  


 


Mean Corpuscular Volume   86.3 fL  


 


Mean Corpuscular Hemoglobin   28.1 pg  


 


Mean Corpuscular Hemoglobin


Concent 


  


  32.5 g/dl 


  


 


 


Platelet Count   47 K/uL  


 


Neutrophils (%) (Auto)   43.7 %  


 


Lymphocytes (%) (Auto)   17.4 %  


 


Monocytes (%) (Auto)   37.2 %  


 


Eosinophils (%) (Auto)   0.7 %  


 


Basophils (%) (Auto)   0.1 %  


 


Neutrophils # (Auto)   3.88 K/uL  


 


Lymphocytes # (Auto)   1.54 K/uL  


 


Monocytes # (Auto)   3.30 K/uL  


 


Eosinophils # (Auto)   0.06 K/uL  


 


Basophils # (Auto)   0.01 K/uL  


 


RDW Standard Deviation   60.1 fL  


 


RDW Coefficient of Variation   19.4 %  


 


Immature Granulocyte % (Auto)   0.9 %  


 


Immature Granulocyte # (Auto)   0.08 K/uL  


 


Nucleated RBC Absolute Count


(auto) 


  


  0.07 K/uL 


  


 


 


Nucleated Red Blood Cells %   0.7 %  


 


Platelet Estimate   DECREASED  


 


Anisocytosis   PRESENT  


 


Echinocytes   1+  


 


Prothrombin Time   12.3 SECONDS  


 


Prothromb Time International


Ratio 


  


  1.1 


  


 


 


Activated Partial


Thromboplast Time 


  


  23.5 SECONDS 


  


 


 


Partial Thromboplastin Ratio   0.9  


 


Sodium Level   142 mmol/L  


 


Potassium Level   4.4 mmol/L  


 


Chloride Level   113 mmol/L  


 


Carbon Dioxide Level   20 mmol/L  


 


Anion Gap   9.0 mmol/L  


 


Blood Urea Nitrogen   45 mg/dl  


 


Creatinine   2.20 mg/dl  


 


Est Creatinine Clear Calc


Drug Dose 


  


  26.7 ml/min 


  


 


 


Estimated GFR (


American) 


  


  31.2 


  


 


 


Estimated GFR (Non-


American 


  


  26.9 


  


 


 


BUN/Creatinine Ratio   20.5  


 


Random Glucose   146 mg/dl  


 


Estimated Average Glucose   174 mg/dl  


 


Hemoglobin A1c   7.7 %  


 


Calcium Level   7.7 mg/dl  


 


Total Bilirubin   0.4 mg/dl  


 


Aspartate Amino Transf


(AST/SGOT) 


  


  9 U/L 


  


 


 


Alanine Aminotransferase


(ALT/SGPT) 


  


  13 U/L 


  


 


 


Alkaline Phosphatase   49 U/L  


 


Total Protein   5.8 gm/dl  


 


Albumin   2.5 gm/dl  


 


Globulin   3.3 gm/dl  


 


Albumin/Globulin Ratio   0.8  














Test


  5/9/17


11:25 5/9/17


13:00 


  


 


 


Bedside Glucose 214 mg/dl    











Medications





 Current Inpatient Medications








 Medications


  (Trade)  Dose


 Ordered  Sig/[place]


 Route  Start Time


 Stop Time Status Last Admin


Dose Admin


 


 Ondansetron HCl


  (Zofran Inj)  4 mg  Q6H  PRN


 IV  5/7/17 16:00


 6/6/17 15:59   


 


 


 Cyanocobalamin


  (Vitamin B-12


 Tab)  1,000 mcg  DAILY


 PO  5/8/17 09:00


 6/7/17 08:59  5/9/17 08:09


1,000 MCG


 


 Nitroglycerin


  (Nitrostat Tab)  0.4 mg  UD  PRN


 SL  5/7/17 16:00


 6/6/17 15:59   


 


 


 Insulin Glargine


  (Lantus Solostar


 Pen)  14 unit  QAM


 SC  5/8/17 09:00


 6/7/17 08:59  5/9/17 08:13


14 UNIT


 


 Glucose


  (Glucose 40% Gel)  15-30


 GRAMS 15


 GRAMS...  UD  PRN


 PO  5/7/17 16:15


 6/6/17 16:14   


 


 


 Glucose


  (Glucose Chew


 Tab)  4-8


 Tablets 4


 Tabl...  UD  PRN


 PO  5/7/17 16:15


 6/6/17 16:14   


 


 


 Dextrose


  (Dextrose 50%


 50ML Syringe)  25-50ML OF


 50% DW IV


 FOR...  UD  PRN


 IV  5/7/17 16:15


 6/6/17 16:14   


 


 


 Glucagon


  (Glucagon Inj)  1 mg  UD  PRN


 SQ  5/7/17 16:15


 6/6/17 16:14   


 


 


 Pantoprazole


 Sodium


  (Protonix Tab)  40 mg  BID


 PO  5/8/17 21:00


 6/7/17 20:59  5/9/17 08:09


40 MG


 


 Insulin Aspart


  (novoLOG ASPART)  SLIDING


 SCALE  ACHS


 SC  5/8/17 16:15


    5/9/17 11:00


9 UNITS











Assessment and Plan


Assessment and Plan:


82 year old male presenting with acute blood loss anemia and melena secondary 

to a GI bleed.





Melena with acute blood loss anemia


Clopidogrel is being held.  He has received 2 units of pRBC and bolus IV fluids 

and symptoms improved.  However, since the initial improvement, Hgb has 

decreased steadily from 8.1-->7.8-->7.5 over the last 24 hours.  Melena 

continues.  No active bleeding was found on EGD, but it did reveal 

angiotelectasia in the stomach and several duodenal erosions. A biopsy for H. 

pylori was taken and results are pending.


-Continue to monitor CBC, coagulation studies, and BMP


-May need to transfuse pRBC if H&H do not stabilize.


-pentoprazole PO to help GI bleed


-May need capsule endoscopy to investigate possible bleed distal to the 

proximal duodenum. 





Thrombocytopenia


Clopidogrel is being held.  Patient is followed by [facility] hem/onc. Has a previous 

history of low platelets but may have been related to MGUS.


-Platelet replacement if needed if the level continues to fall.


-Check for any spontaneous bleeding or bruising





Hypotension


Resolved


May be able to start Furosemide and Amlodipine again soon for HTN





Diabetes


Considering age and severity of comorbitidies, a recent Hgb A1C of 7.7 reveals 

relatively well controlled diabetes.  Patient has experienced blood glucose 

fluctuations in the past including a hospital visit for hypoglycemia, so 

continue to educate the patient and family on diet and medication 

administration. 





Hyperlipidemia


Lipitor was held, but being resumed tomorrow 5/10





CKD stage IV


Creatinine stable at 2.2


-Continue to monitor Intake and Output as well as Creatinine levels





DVT Prophylaxis


-SCDs


Continued [facility] stay due to:  multiple IV medications needed, other (unstable H&H

, unstable platelets, persistent melena)

## 2017-05-09 NOTE — FAMILY MEDICINE PROGRESS NOTE
Progress Note


Date of Service


May 9, 2017.





Subjective


Pt evaluation today including:  conversation w/ patient, conversation w/ family

, physical exam, chart review, lab review


Patient feeling well currently, he denies any pain or discomfort. He does state 

that he has felt dizzy/lightheaded over the last few days, but that it has 

improved since admission. He is able to walk comfortably to the washroom 

without further orthostatic symptoms. He denies SOB, headaches, syncope or 

presyncopal episodes. He does say that he occasionally gets brief chest pain 

which resolves with nitroglycerin at home. He has not required any 

nitroglycerin in hospital.





   Constitutional:  No chills, No fever


   Respiratory:  + dyspnea on exertion, No cough, No shortness of breath, No 

wheezing


   Cardiovascular:  No chest pain, No edema, No palpitations


   Abdomen:  + GI bleeding (still having dark stools), No constipation, No 

diarrhea, No nausea, No pain, No vomiting


   Musculoskeletal:  No joint pain


   Male :  No dysuria, No hematuria


   Neurologic:  No balance problems, No numbness/tingling, No weakness


   Psychiatric:  No anxiety


   Endo:  + fatigue





Objective


Vital Signs











  Date Time  Temp Pulse Resp B/P Pulse Ox O2 Delivery O2 Flow Rate FiO2


 


5/9/17 15:26 36.5 64 22 158/81 97 Room Air  


 


5/9/17 13:35     97   


 


5/9/17 12:02      Room Air  


 


5/9/17 11:56 36.9 66 18 110/60 96   


 


5/9/17 08:06      Room Air  


 


5/9/17 07:54 36.8 58 18 107/59 96   


 


5/9/17 05:59      Room Air  


 


5/9/17 03:45 36.4 71 20 144/71 92 Room Air  


 


5/8/17 23:49 36.6 66 19 125/68 92 Room Air  


 


5/8/17 23:18      Room Air  


 


5/8/17 20:00      Room Air  


 


5/8/17 19:11 36.6 64 20 148/71 96 Room Air  


 


5/8/17 16:05 36.6 61 18 158/78 97 Room Air  


 


5/8/17 16:00      Room Air  











Physical Exam


General Appearance:  WD/WN, no apparent distress


Eyes:  + pertinent finding (some conjunctival pallor)


ENT:  hearing grossly normal, pharynx normal


Neck:  supple


Respiratory/Chest:  lungs clear, normal breath sounds, no respiratory distress, 

no accessory muscle use


Cardiovascular:  regular rate, rhythm, no murmur


Abdomen:  normal bowel sounds, non tender, soft


Extremities:  normal inspection, no pedal edema, no calf tenderness


Neurologic/Psychiatric:  alert, normal mood/affect, oriented x 3





Laboratory Results





Results Past 24 Hours








Test


  5/8/17


20:07 5/9/17


06:00 5/9/17


06:52 5/9/17


11:25 Range/Units


 


 


Bedside Glucose 255  156 214 70-99  mg/dl


 


White Blood Count  8.87   4.8-10.8  K/uL


 


Red Blood Count  2.78   4.7-6.1  M/uL


 


Hemoglobin  7.8   14.0-18.0  g/dL


 


Hematocrit  24.0   42-52  %


 


Mean Corpuscular Volume  86.3     fL


 


Mean Corpuscular Hemoglobin  28.1   25-34  pg


 


Mean Corpuscular Hemoglobin


Concent 


  32.5


  


  


  32-36  g/dl


 


 


Platelet Count  47   130-400  K/uL


 


Neutrophils (%) (Auto)  43.7    %


 


Lymphocytes (%) (Auto)  17.4    %


 


Monocytes (%) (Auto)  37.2    %


 


Eosinophils (%) (Auto)  0.7    %


 


Basophils (%) (Auto)  0.1    %


 


Neutrophils # (Auto)  3.88   1.4-6.5  K/uL


 


Lymphocytes # (Auto)  1.54   1.2-3.4  K/uL


 


Monocytes # (Auto)  3.30   0.11-0.59  K/uL


 


Eosinophils # (Auto)  0.06   0-0.5  K/uL


 


Basophils # (Auto)  0.01   0-0.2  K/uL


 


RDW Standard Deviation  60.1   36.4-46.3  fL


 


RDW Coefficient of Variation  19.4   11.5-14.5  %


 


Immature Granulocyte % (Auto)  0.9    %


 


Immature Granulocyte # (Auto)  0.08   0.00-0.02  K/uL


 


Nucleated RBC Absolute Count


(auto) 


  0.07


  


  


  0-0  K/uL


 


 


Nucleated Red Blood Cells %  0.7    %


 


Platelet Estimate  DECREASED    


 


Anisocytosis  PRESENT    


 


Echinocytes  1+    


 


Prothrombin Time


  


  12.3


  


  


  9.0-12.0


SECONDS


 


Prothromb Time International


Ratio 


  1.1


  


  


  0.9-1.1  


 


 


Activated Partial


Thromboplast Time 


  23.5


  


  


  21.0-31.0


SECONDS


 


Partial Thromboplastin Ratio  0.9    


 


Sodium Level  142   136-145  mmol/L


 


Potassium Level  4.4   3.5-5.1  mmol/L


 


Chloride Level  113     mmol/L


 


Carbon Dioxide Level  20   21-32  mmol/L


 


Anion Gap  9.0   3-11  mmol/L


 


Blood Urea Nitrogen  45   7-18  mg/dl


 


Creatinine


  


  2.20


  


  


  0.60-1.40


mg/dl


 


Est Creatinine Clear Calc


Drug Dose 


  26.7


  


  


   ml/min


 


 


Estimated GFR (


American) 


  31.2


  


  


   


 


 


Estimated GFR (Non-


American 


  26.9


  


  


   


 


 


BUN/Creatinine Ratio  20.5   10-20  


 


Random Glucose  146   70-99  mg/dl


 


Estimated Average Glucose  174    mg/dl


 


Hemoglobin A1c  7.7   4.5-5.6  %


 


Calcium Level  7.7   8.5-10.1  mg/dl


 


Total Bilirubin  0.4   0.2-1  mg/dl


 


Aspartate Amino Transf


(AST/SGOT) 


  9


  


  


  15-37  U/L


 


 


Alanine Aminotransferase


(ALT/SGPT) 


  13


  


  


  12-78  U/L


 


 


Alkaline Phosphatase  49     U/L


 


Total Protein  5.8   6.4-8.2  gm/dl


 


Albumin  2.5   3.4-5.0  gm/dl


 


Globulin  3.3   2.5-4.0  gm/dl


 


Albumin/Globulin Ratio  0.8   0.9-2  














Test


  5/9/17


12:57 5/9/17


16:03 


  


  Range/Units


 


 


White Blood Count 9.73    4.8-10.8  K/uL


 


Red Blood Count 2.72    4.7-6.1  M/uL


 


Hemoglobin 7.5    14.0-18.0  g/dL


 


Hematocrit 23.6    42-52  %


 


Mean Corpuscular Volume 86.8      fL


 


Mean Corpuscular Hemoglobin 27.6    25-34  pg


 


Mean Corpuscular Hemoglobin


Concent 31.8


  


  


  


  32-36  g/dl


 


 


RDW Standard Deviation 60.0    36.4-46.3  fL


 


RDW Coefficient of Variation 19.4    11.5-14.5  %


 


Platelet Count 41    130-400  K/uL


 


Platelet Estimate DECREASED     


 


Bedside Glucose  272   70-99  mg/dl











Assessment and Plan


81 year old male presenting with severe anemia with melena and hypotension





Melena with acute blood loss anemia - ongoing melena despite lactulose and H/H 

continuing slow trend downwards. Patient describing anginal symptoms. 


Plavix held on admission. 2 unit pRBC transfused on 5/7. Negative GI evaluation 

in 5/16. GI consult- recs appreciated. 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 

some duodenal erosions but no active bleeding. Argon coagulation applied. 

Biopsies for H.Pylori taken. 





- Transfuse 1 unit pRBC (1 unit pRBC on hold if needed)


- Protonix 40mg BID


- Recheck CBC, coags, BMP tomorrow AM


- As per GI, may proceed to nuclear med bleeding scan


- Trace H. pylori biopsy results


- Iron supplementation on discharge





Hypotension - BPs improved. Discontinued mIVF


- Home Norvasc and Furosemide held





Thrombocytopenia with h/o IgG Anand MGUS - followed by [facility] hematology/

oncology. Previous history of low platelets. Initial resuscitation with fluids 

and blood products, and monitoring of platelets


- Consider platelet replacement if platelet level continues to fall or 

spontaneous bleeding/bruising





Leukocytosis - Previously elevated at 17.28, but now back WNL. Likely secondary 

to acute stress given stable vitals. UA negative.





Diabetes - elevated glucose levels on lowered dose of Lantus for NPO status


- Lantus 16 units qAM


- ISS





ZIGGY on CKD IV - Creatinine 2.8 on admission, now at baseline 2.2


- Monitor I/O


- Trend on BMP tomorrow





CAD - s/p CABG - on Plavix instead of aspirin given thrombocytopenia


- Lipitor 40mg daily 


- Plavix held





DVT Prophylaxis


- SCDs





Resuscitation status


- Full Resuscitation





Dispo


- PT/OT evals


Continued [facility] stay due to:  multiple IV medications needed, other (unstable H/H

)


Discharge planning:  home





Resident Tracking


Resident Involvement:  Resident Care Provided


Care Provided:  Adult Hospital Medicine





Reviewed:  Pt Seen/Exam by Me


History


Resident Physician Supervision Note:


I interviewed and examined the patient. Discussed with Dr. Betancourt and agree with 

findings and plan as documented in the note. Any exceptions or clarifications 

are listed here: 





Having substernal chest pain with bilateral arm numbness with walking around 

the hallway and back today that goes away with rest.  He reports this is his 

typical angina because with nitroglycerin at home.  Hemoglobin down to 7.5 

today.








Vitals reviewed


NAD


RRR no mgr


CTAB no wcr


+BS soft NT ND no HSM


Ext no edema





81 yo male with acute blood loss anemia from GI bleeding. EGD with possible 

previous source of bleeding from angioectasia in stomach and duodenal erosions.

  Hemoglobin just slightly lower today at 7.5 and black stool probably old blood


-continue to follow CBC, hold Plavix


-Transfused 2 units PRBCs today due to history of CAD and stable angina in the 

hospital, patient refusing EKG and nitroglycerin at this time as symptoms 

resolved with rest


-continue PPI


-may need another capsule endoscopy but will discuss with GI


-Consider RBC tagged scan if has persistent evidence of bleeding


-may need plts transfused if continued bleeding and <50k





Documented By:  Alfreda Manriquez

## 2017-05-10 VITALS
SYSTOLIC BLOOD PRESSURE: 163 MMHG | TEMPERATURE: 97.52 F | OXYGEN SATURATION: 98 % | HEART RATE: 53 BPM | DIASTOLIC BLOOD PRESSURE: 76 MMHG

## 2017-05-10 VITALS
TEMPERATURE: 97.88 F | HEART RATE: 53 BPM | SYSTOLIC BLOOD PRESSURE: 165 MMHG | DIASTOLIC BLOOD PRESSURE: 75 MMHG | OXYGEN SATURATION: 96 %

## 2017-05-10 VITALS
OXYGEN SATURATION: 96 % | SYSTOLIC BLOOD PRESSURE: 160 MMHG | TEMPERATURE: 97.52 F | DIASTOLIC BLOOD PRESSURE: 80 MMHG | HEART RATE: 59 BPM

## 2017-05-10 VITALS
HEART RATE: 52 BPM | DIASTOLIC BLOOD PRESSURE: 79 MMHG | SYSTOLIC BLOOD PRESSURE: 161 MMHG | TEMPERATURE: 97.52 F | OXYGEN SATURATION: 97 %

## 2017-05-10 VITALS
HEART RATE: 51 BPM | OXYGEN SATURATION: 97 % | SYSTOLIC BLOOD PRESSURE: 179 MMHG | TEMPERATURE: 97.52 F | DIASTOLIC BLOOD PRESSURE: 78 MMHG

## 2017-05-10 VITALS
OXYGEN SATURATION: 95 % | TEMPERATURE: 97.34 F | HEART RATE: 57 BPM | DIASTOLIC BLOOD PRESSURE: 72 MMHG | SYSTOLIC BLOOD PRESSURE: 150 MMHG

## 2017-05-10 VITALS
HEART RATE: 60 BPM | DIASTOLIC BLOOD PRESSURE: 81 MMHG | OXYGEN SATURATION: 100 % | TEMPERATURE: 98.24 F | SYSTOLIC BLOOD PRESSURE: 166 MMHG

## 2017-05-10 VITALS
TEMPERATURE: 97.52 F | SYSTOLIC BLOOD PRESSURE: 153 MMHG | DIASTOLIC BLOOD PRESSURE: 72 MMHG | HEART RATE: 52 BPM | OXYGEN SATURATION: 96 %

## 2017-05-10 VITALS
OXYGEN SATURATION: 98 % | HEART RATE: 55 BPM | DIASTOLIC BLOOD PRESSURE: 75 MMHG | SYSTOLIC BLOOD PRESSURE: 178 MMHG | TEMPERATURE: 97.52 F

## 2017-05-10 VITALS
HEART RATE: 54 BPM | TEMPERATURE: 97.7 F | DIASTOLIC BLOOD PRESSURE: 72 MMHG | SYSTOLIC BLOOD PRESSURE: 156 MMHG | OXYGEN SATURATION: 98 %

## 2017-05-10 VITALS — OXYGEN SATURATION: 96 %

## 2017-05-10 VITALS — OXYGEN SATURATION: 95 %

## 2017-05-10 VITALS
SYSTOLIC BLOOD PRESSURE: 158 MMHG | OXYGEN SATURATION: 98 % | TEMPERATURE: 98.24 F | HEART RATE: 54 BPM | DIASTOLIC BLOOD PRESSURE: 63 MMHG

## 2017-05-10 VITALS
SYSTOLIC BLOOD PRESSURE: 166 MMHG | HEART RATE: 52 BPM | TEMPERATURE: 97.52 F | OXYGEN SATURATION: 96 % | DIASTOLIC BLOOD PRESSURE: 72 MMHG

## 2017-05-10 VITALS — OXYGEN SATURATION: 97 %

## 2017-05-10 VITALS — OXYGEN SATURATION: 98 %

## 2017-05-10 LAB
ANION GAP SERPL CALC-SCNC: 9 MMOL/L (ref 3–11)
BASOPHILS # BLD: 0.01 K/UL (ref 0–0.2)
BASOPHILS NFR BLD: 0.2 %
BUN SERPL-MCNC: 34 MG/DL (ref 7–18)
BUN/CREAT SERPL: 15.4 (ref 10–20)
CALCIUM SERPL-MCNC: 8.1 MG/DL (ref 8.5–10.1)
CHLORIDE SERPL-SCNC: 110 MMOL/L (ref 98–107)
CO2 SERPL-SCNC: 21 MMOL/L (ref 21–32)
COMPLETE: YES
CREAT CL PREDICTED SERPL C-G-VRATE: 26.7 ML/MIN
CREAT SERPL-MCNC: 2.2 MG/DL (ref 0.6–1.4)
EOSINOPHIL NFR BLD AUTO: 37 K/UL (ref 130–400)
FERRITIN SERPL-MCNC: 33.5 NG/ML (ref 8–388)
GLUCOSE SERPL-MCNC: 186 MG/DL (ref 70–99)
HCT VFR BLD CALC: 28.6 % (ref 42–52)
HCT VFR BLD CALC: 30.5 % (ref 42–52)
IG%: 2.1 %
IMM GRANULOCYTES NFR BLD AUTO: 22.7 %
LYMPHOCYTES # BLD: 1.51 K/UL (ref 1.2–3.4)
MCH RBC QN AUTO: 29.4 PG (ref 25–34)
MCHC RBC AUTO-ENTMCNC: 34.3 G/DL (ref 32–36)
MCV RBC AUTO: 85.9 FL (ref 80–100)
MONOCYTES NFR BLD: 18 %
NEUTROPHILS # BLD AUTO: 0.5 %
NEUTROPHILS NFR BLD AUTO: 56.5 %
PMV BLD AUTO: 10.2 FL (ref 7.4–10.4)
POTASSIUM SERPL-SCNC: 4.1 MMOL/L (ref 3.5–5.1)
RBC # BLD AUTO: 3.33 M/UL (ref 4.7–6.1)
SODIUM SERPL-SCNC: 140 MMOL/L (ref 136–145)
TIBC SERPL-MCNC: 256 MCG/DL (ref 250–450)
WBC # BLD AUTO: 6.65 K/UL (ref 4.8–10.8)

## 2017-05-10 RX ADMIN — INSULIN ASPART SCH UNITS: 100 INJECTION, SOLUTION INTRAVENOUS; SUBCUTANEOUS at 11:48

## 2017-05-10 RX ADMIN — INSULIN ASPART SCH UNITS: 100 INJECTION, SOLUTION INTRAVENOUS; SUBCUTANEOUS at 07:43

## 2017-05-10 RX ADMIN — INSULIN GLARGINE SCH UNIT: 100 INJECTION, SOLUTION SUBCUTANEOUS at 07:44

## 2017-05-10 RX ADMIN — PANTOPRAZOLE SCH MG: 40 TABLET, DELAYED RELEASE ORAL at 19:27

## 2017-05-10 RX ADMIN — INSULIN ASPART SCH UNITS: 100 INJECTION, SOLUTION INTRAVENOUS; SUBCUTANEOUS at 17:02

## 2017-05-10 RX ADMIN — INSULIN ASPART SCH UNITS: 100 INJECTION, SOLUTION INTRAVENOUS; SUBCUTANEOUS at 21:00

## 2017-05-10 RX ADMIN — ATORVASTATIN CALCIUM SCH MG: 40 TABLET, FILM COATED ORAL at 07:40

## 2017-05-10 RX ADMIN — Medication SCH MCG: at 07:40

## 2017-05-10 RX ADMIN — PANTOPRAZOLE SCH MG: 40 TABLET, DELAYED RELEASE ORAL at 07:40

## 2017-05-10 NOTE — ONCOLOGY CONSULTATION
DATE OF CONSULTATION:  05/10/2017

 

DATE OF CONSULTATION:  05/10/2017.  

 

REASON FOR CONSULTATION:  Anemia and thrombocytopenia.

 

HISTORY OF PRESENT ILLNESS:  Mr. Hummel is an 82-year-old gentleman who was

admitted on 05/07/2017 with ongoing gastrointestinal bleeding.  According to

Chase he had noticed darkening of his stools but did not relay this  to

any family members until the day of admission.  During the week preceding

admission, he had become very short of breath with ambulation to the point

where he was stumbling and the risk of fall was quite high.  He also reports

becoming lightheaded on change of positions, specifically arising from bed. 

Mr. Hummel has a noted history of requiring blood transfusions.  He has been

hospitalized on numerous occasions over the past year.  In fact, I had seen

Mr. Hummel  back in December 2015, at which time he was evaluated for anemia. 

At that time, I had suggested  with his history of iron deficiency blood loss

should be ruled out.  He also had a degree of renal insufficiency which may

have been attributable.  For reasons unclear, he was lost to follow up.  In

fact, he was supposed to see me as an outpatient within the past couple weeks

and because of adverse weather  was unable to make that appointment.  I have

read gastroenterology notes, it appears he has undergone EGD with no direct 

active bleeding.  The consultant suggests that if his stools normalize

capsule endoscopy may be arranged for as outpatient.  There is certainly a

high degree of suspicion for arteriovenous malformation bleeding.  Since

hospitalization, he has received 4 units of packed RBCs.  His platelet count

has been steadily decreasing presently at 37,000.  Important to note, his

platelet count was well over 100,000 when I saw him in consultation which

suggests a subacute onset process, particularly antibody related which may be

causing his decreased platelet count.

 

PAST MEDICAL HISTORY:  Include coronary artery disease, history of iron

deficiency, hypertension, chronic kidney disease stage IV, gastrointestinal

bleeding and thrombocytopenia as well as monoclonal gammopathy of unclear

significance.

 

PAST SURGICAL HISTORY:  Coronary artery bypass grafting in 2008 and inguinal

hernia repair in 2007.

 

MEDICATIONS:  Prior to admission include Norvasc 10 mg p.o. every day,

atorvastatin 40 mg p.o. every day, cholecalciferol 1 tablet p.o. every day,

Plavix 75 mg p.o. every day,  cyanocobalamin 1000 mcg p.o. every day, insulin

Glargine 18-20 units subQ  q.a.m., insulin lispro 8 units subQ t.i.d. and

Imdur 120 mg p.o. q.a.m., Protonix 40 mg p.o. every day.

 

ALLERGIES:  No known drug allergies.

 

FAMILY HISTORY:  Positive for coronary artery disease on his paternal side

and Parkinson's disease.

 

SOCIAL HISTORY:  The patient lives with his wife, negative for smoking,

alcohol or illicit drug use.

 

REVIEW OF SYSTEMS:  Again, most notably for decreased exercise tolerance,

lightheadedness and fatigue.  No fevers, chills or sweats.  He denies weight

loss or anorexia.

HEENT:  Negative for headaches, lightheadedness or dizziness.  No visual or

hearing deficits.  He wears corrective lenses, however.  No dysphagia or sore

throat.

LYMPH:  No history of lymphoproliferative disorder or peripheral

lymphadenopathy.

CARDIAC:  Positive for coronary artery disease by history.  No current angina

or palpitations.

PULMONARY:  Negative for COPD.  No shortness of breath, dyspnea or orthopnea.

GASTROINTESTINAL:  Currently being evaluated for possible upper

gastrointestinal bleeding.  Positive for melena.

GENITOURINARY:  No history of prostate disease.  No current hematuria,

dysuria, urinary incontinence.

PSYCHIATRIC:  Negative for anxiety, depression or psychoses.

ENDOCRINE:  Positive for diabetes mellitus, negative for thyroid disease.

NEUROLOGIC:  Negative for seizure, stroke, or migraine headache.

HEMATOLOGIC:  Positive for anemia and thrombocytopenia.

 

PHYSICAL EXAMINATION:

GENERAL:  Mr. Hummel is a pleasant 82-year-old gentleman lying supine in bed

in no acute distress.

VITAL SIGNS:  Temperature 36.4, pulse 52, respiration 16, blood pressure

153/72.

SKIN:  Warm, dry, noncyanotic with petechia, rash or ecchymosis.

HEAD, EYES, EARS, NOSE, AND THROAT:  Head atraumatic, normocephalic.

Eyes --  PERRLA, EOMI.  Sclerae nonicteric.  No conjunctival injection. 

Nares are patent without rhinorrhea or discharge.  Throat is clear.  Tongue

is midline.  Mucous membranes are moist.

NECK:  Supple without JVD or thyromegaly.

LYMPH:  No cervical, supraclavicular, axillary or palpable nodes.

HEART:  Regular rate and rhythm.  No clicks, rubs, murmurs or gallops.

LUNGS:  Clear to auscultation bilaterally.

ABDOMEN:  Soft, nontender, nondistended, without palpable hepatosplenomegaly.

EXTREMITIES:  No calf tenderness or swelling.

MUSCULOSKELETAL:  Strength and pulses are equal.

NEUROLOGICALLY:  Nonfocal, awake, alert and oriented x3.  Cranial nerves are

intact.  No gross motor or sensory deficits are noted.

 

LABORATORY DATA:  WBC count 6650, hemoglobin 9.8, hematocrit 28.6%, platelet

count 37,000.  Sodium 140, potassium 4.1, chloride 110, carbon dioxide 21,

creatinine 2.2, BUN 34.

 

IMPRESSION:

1. Suspected upper gastrointestinal bleeding.

2. Normocytic normochromic anemia.

3. Thrombocytopenia.

4. Chronic renal disease.

 

PLAN:  I am asked to evaluate Mr. Hummel for ongoing recurrent anemia and

relatively new onset thrombocytopenia.  [facility] is well [facility].  I last saw him in consultation back in December 2015, at which

time his platelet count was within normal limits but was having issues with

gastrointestinal bleeding and anemia at that time.  For reasons unclear he

was lost to follow up.  Nonetheless, there is suggestion of active bleeding

as he has required 4 units of transfused blood since hospitalization. 

Gastroenterology suspects this could represent AVM bleeding.  However,

diagnosis has not been formalized.  They suggest t stabilization and

outpatient camera endoscopy.  I agree camera endoscopy would be effective in

evaluating the small bowel which is missed doing solely  EGD and colonoscopy.

 I suspect his anemia is multifactorial and will check iron stores and

potentially incorporate erythropoietin as an outpatient.  

 

In regards to his low platelets, this is relatively new.  Thrombocytopenias

occur secondary to sequestration versus increased destruction versus

decreased production.  Conceivably there may be myelosuppression and

dilutional effect from  transfusion during this admission.  However, antibody

is not ruled out and Mr. Hummel should be monitored closely once he is

discharged.  I am concerned with his renal insufficiency however platelet

function may not be optimal and suggest transfusion  single donor platelet

pheresis in the hopes of maybe  stabilizing his ongoing bleeding.  All

antiplatelet agents and blood thinners should be held until bleeding source

found.  I will continue to follow Mr. Hummel closely during hospitalization

and ensure he is seen in followup post discharge.

 

Thank you very much for allowing me to participate in his care.  If you have

any questions or concerns with these recommendations feel free to contact me

at any time.

## 2017-05-10 NOTE — PROGRESS NOTE
DATE: 05/10/2017

 

SUBJECTIVE:  The patient reports no abdominal pain and has a good appetite

and eating supper.

 

Objectively, the patient's vital signs show blood pressure 161/79, pulse 52,

temperature is 36.4.  The patient has received 2 units of red cells on May

7th and 2 more on May 9th.  Today his hemoglobin is 9.8, ups from 7.5

yesterday.  He did have a little bit of a dark stool earlier today, that is

the only bowel movement all day and he did think it was lighter than it has

been in the past.

 

IMPRESSION:  The patient has continued gastrointestinal blood loss of unclear

etiology.  It appears to be improving at this.  He did get some platelets

today for a low platelet count which may be autoimmune.

 

PLAN:  The plan is for him to get an outpatient small bowel video capsule

procedure as long as he does not have any more active bleeding and his

hemoglobin and hematocrit remain stable.  If he shows signs of continued

bleeding, then he will need to have a tagged red blood cell scan, during his

hospital stay.

## 2017-05-10 NOTE — FAMILY MEDICINE PROGRESS NOTE
Progress Note


Date of Service


May 10, 2017.





Subjective


Pt evaluation today including:  conversation w/ patient, conversation w/ family

, physical exam, chart review, lab review


Patient feeling well currently, he denies any pain or discomfort. He denies SOB

, headaches, syncope or presyncopal episodes, feeling dizzy/lightheaded. He has 

not noticed any swelling in his face or extremities. He has not gone for a walk 

since the transfusion was completed last night, but feels that he would be able 

to. He did have another bowel movement with dark tarry stools yesterday evening.





   Constitutional:  + fatigue, No chills, No fever, No weakness


   Respiratory:  No cough, No shortness of breath


   Cardiovascular:  No chest pain, No edema, No palpitations


   Abdomen:  + GI bleeding, No nausea, No pain, No vomiting


   Male :  No dysuria, No hematuria





Objective


Vital Signs











  Date Time  Temp Pulse Resp B/P Pulse Ox O2 Delivery O2 Flow Rate FiO2


 


5/10/17 04:00     95 Room Air  


 


5/10/17 03:43 36.3 57 19 150/72 95 Room Air  


 


5/10/17 00:00     95 Room Air  


 


5/9/17 23:13 36.5 58 20 166/75 95   


 


5/9/17 21:19 36.7 65 17 143/73 97   


 


5/9/17 20:49 36.6 66 16 154/69 97   


 


5/9/17 20:43 36.5 62 17 186/78 96   


 


5/9/17 20:00     96 Room Air  


 


5/9/17 19:58 36.6 55 18 154/71 97   


 


5/9/17 19:16 36.6 61 17 140/60 98   


 


5/9/17 18:08 36.6 58 17 172/66 99   


 


5/9/17 17:30 36.6 58 17 173/66 97   


 


5/9/17 17:24 36.6 60 17 167/77 97   


 


5/9/17 16:00     95 Room Air  


 


5/9/17 15:26 36.5 64 22 158/81 97 Room Air  


 


5/9/17 13:35     97   


 


5/9/17 12:02      Room Air  


 


5/9/17 11:56 36.9 66 18 110/60 96   


 


5/9/17 08:06      Room Air  











Physical Exam


General Appearance:  WD/WN, no apparent distress


Eyes:  + pertinent finding


ENT:  pharynx normal


Neck:  supple


Respiratory/Chest:  lungs clear, normal breath sounds, no respiratory distress, 

no accessory muscle use


Cardiovascular:  regular rate, rhythm, + bradycardia


Abdomen:  normal bowel sounds, non tender, soft


Extremities:  normal inspection, no pedal edema, no calf tenderness


Neurologic/Psychiatric:  alert, normal mood/affect, oriented x 3


Skin:  normal color, warm/dry, no rash





Laboratory Results





Results Past 24 Hours








Test


  5/10/17


06:54 5/10/17


08:30 5/10/17


11:31 5/10/17


12:45 Range/Units


 


 


Bedside Glucose 144  223  70-99  mg/dl


 


White Blood Count  6.65   4.8-10.8  K/uL


 


Red Blood Count  3.33   4.7-6.1  M/uL


 


Hemoglobin  9.8   14.0-18.0  g/dL


 


Hematocrit  28.6   42-52  %


 


Mean Corpuscular Volume  85.9     fL


 


Mean Corpuscular Hemoglobin  29.4   25-34  pg


 


Mean Corpuscular Hemoglobin


Concent 


  34.3


  


  


  32-36  g/dl


 


 


Platelet Count  37   130-400  K/uL


 


Mean Platelet Volume  10.2   7.4-10.4  fL


 


Neutrophils (%) (Auto)  56.5    %


 


Lymphocytes (%) (Auto)  22.7    %


 


Monocytes (%) (Auto)  18.0    %


 


Eosinophils (%) (Auto)  0.5    %


 


Basophils (%) (Auto)  0.2    %


 


Neutrophils # (Auto)  3.76   1.4-6.5  K/uL


 


Lymphocytes # (Auto)  1.51   1.2-3.4  K/uL


 


Monocytes # (Auto)  1.20   0.11-0.59  K/uL


 


Eosinophils # (Auto)  0.03   0-0.5  K/uL


 


Basophils # (Auto)  0.01   0-0.2  K/uL


 


RDW Standard Deviation  54.9   36.4-46.3  fL


 


RDW Coefficient of Variation  17.8   11.5-14.5  %


 


Immature Granulocyte % (Auto)  2.1    %


 


Immature Granulocyte # (Auto)  0.14   0.00-0.02  K/uL


 


Sodium Level  140   136-145  mmol/L


 


Potassium Level  4.1   3.5-5.1  mmol/L


 


Chloride Level  110     mmol/L


 


Carbon Dioxide Level  21   21-32  mmol/L


 


Anion Gap  9.0   3-11  mmol/L


 


Blood Urea Nitrogen  34   7-18  mg/dl


 


Creatinine


  


  2.20


  


  


  0.60-1.40


mg/dl


 


Est Creatinine Clear Calc


Drug Dose 


  26.7


  


  


   ml/min


 


 


Estimated GFR (


American) 


  31.2


  


  


   


 


 


Estimated GFR (Non-


American 


  26.9


  


  


   


 


 


BUN/Creatinine Ratio  15.4   10-20  


 


Random Glucose  186   70-99  mg/dl


 


Calcium Level  8.1   8.5-10.1  mg/dl


 


Chemistry Specimen Hemolysis      


 


Peripheral Blood Smear Path


Consult 


  


  


  


   


 














Test


  5/10/17


16:08 5/10/17


17:16 5/10/17


20:13 


  Range/Units


 


 


Bedside Glucose 136  166  70-99  mg/dl


 


Hemoglobin  9.9   14.0-18.0  g/dL


 


Hematocrit  30.5   42-52  %


 


Absolute Reticulocyte Count


  


  0.11


  


  


  0.02-0.10


10^6/uL


 


Percent Reticulocyte Count  3.1   0.5-2.0  %


 


Iron Level  45     mcg/dl


 


Total Iron Binding Capacity  256   250-450  mcg/dl


 


Ferritin


  


  33.5


  


  


  8.0-388.0


ng/ml


 


Vitamin B12 Level  513   211-911  pg/mL


 


Folate  18.06   >5.38  ng/mL











Assessment and Plan


81 year old male presenting with severe anemia with melena and hypotension





Melena with acute blood loss anemia - ongoing melena. Bleeding source not 

confirmed. Patient asymptomatic currently. H/H stable


Plavix held on admission. 2 unit pRBC transfused on 5/7. Negative GI evaluation 

in 5/16. GI consult- recs appreciated. 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 

some duodenal erosions but no active bleeding. Argon coagulation applied. 

Biopsies for H.Pylori taken. 2 unit pRBC transfused on 5/9. Iron stores 

assessed and found to be largely WNL.





- Protonix 40mg BID


- Recheck CBC, coags, BMP tomorrow AM


- As per GI, may proceed to nuclear med bleeding scan if ongoing bleeding


- Trace H. pylori biopsy results


- Iron supplementation on discharge





Hypotension - BPs in acceptable range


IVF bolus and mIVF given at admission, but since discontinued.


- Home Norvasc and Furosemide held





Thrombocytopenia with h/o IgG Anand MGUS - previously followed by [facility] 

hematology/oncology. Previous history of low platelets. Initial resuscitation 

with fluids and blood products, and monitoring of platelets. Hematology/

oncology consulted- recs appreciated. Required platelet transfusion of 1 unit 5/

10.


- Trend platelets on CBC


- Consider repeat platelet replacement if platelet level continues to fall or 

spontaneous bleeding/bruising





Leukocytosis - Previously elevated at 17.28, but now back WNL. Likely secondary 

to acute stress given stable vitals. UA negative.





Diabetes - elevated glucose levels on lowered dose of Lantus for NPO status, 

now improved with slight dose increase


- Lantus 16 units qAM


- ISS





ZIGGY on CKD IV - Creatinine 2.8 on admission, now at baseline 2.2


- Monitor I/O


- Trend on BMP tomorrow





CAD - s/p CABG - on Plavix instead of aspirin given thrombocytopenia


- Lipitor 40mg daily 


- Plavix held





DVT Prophylaxis


- SCDs





Resuscitation status


- Full Resuscitation





Dispo


- PT/OT evals


Continued [facility] stay due to:  multiple IV medications needed


Discharge planning:  home





Resident Tracking


Resident Involvement:  Resident Care Provided


Care Provided:  Adult Hospital Medicine





Reviewed:  Pt Seen/Exam by Me


History


Resident Physician Supervision Note:


I interviewed and examined the patient. Discussed with Dr. Betancourt and agree with 

findings and plan as documented in the note. Any exceptions or clarifications 

are listed here: 





No more chest pain, doing well today, had 1 small bowel movement and he did not 

see what color it was, hemoglobin remained stable.  Transfuse 2 units yesterday





Vitals reviewed


NAD


RRR no mgr


CTAB no wcr


+BS soft NT ND no HSM


Ext no edema


Skin-some petechiae on the chest where he scratched himself earlier today





83 yo male with acute blood loss anemia from GI bleeding. EGD with possible 

previous source of bleeding from angioectasia in stomach and duodenal erosions.

  Hemoglobin dropped again to 7.5 and transfused 2 more units on 05/09/2017, 

hemoglobin up to 9.8 now today and stable.  Platelets continue to drop down to 

37.  Thrombocytopenia may be related to autoimmune process.  Peripheral smear 

showed evidence of MDS


-continue to follow CBC, hold Plavix


--Consult hematology appreciated-transfused platelets today, appreciate further 

recommendations moving forward


-continue PPI


-may need another capsule endoscopy as an outpatient


-Consider RBC tagged scan if has persistent evidence of bleeding





Documented By:  Alfreda Manriquez

## 2017-05-10 NOTE — MEDICAL STUDENT: [facility]
Med Student Progress Note


Date of Service


May 10, 2017.





Subjective


Pt evaluation today including:  conversation w/ patient, conversation w/ family

, physical exam, chart review, lab review, review of studies, review of 

inpatient medication list





Objective


Vital Signs











  Date Time  Temp Pulse Resp B/P Pulse Ox O2 Delivery O2 Flow Rate FiO2


 


5/10/17 12:00     97 Room Air  


 


5/10/17 11:50 36.8 54 18 158/63 98   


 


5/10/17 08:29 36.8 60 18 166/81 100   


 


5/10/17 08:00     95 Room Air  


 


5/10/17 04:00     95 Room Air  


 


5/10/17 03:43 36.3 57 19 150/72 95 Room Air  


 


5/10/17 00:00     95 Room Air  


 


5/9/17 23:13 36.5 58 20 166/75 95   


 


5/9/17 21:19 36.7 65 17 143/73 97   


 


5/9/17 20:49 36.6 66 16 154/69 97   


 


5/9/17 20:43 36.5 62 17 186/78 96   


 


5/9/17 20:00     96 Room Air  


 


5/9/17 19:58 36.6 55 18 154/71 97   


 


5/9/17 19:16 36.6 61 17 140/60 98   


 


5/9/17 18:08 36.6 58 17 172/66 99   


 


5/9/17 17:30 36.6 58 17 173/66 97   


 


5/9/17 17:24 36.6 60 17 167/77 97   


 


5/9/17 16:00     95 Room Air  


 


5/9/17 15:26 36.5 64 22 158/81 97 Room Air  


 


5/9/17 13:35     97   











Laboratory Results





Last 24 Hours








Test


  5/9/17


16:03 5/9/17


20:04 5/10/17


06:54 5/10/17


08:30


 


Bedside Glucose 272 mg/dl  164 mg/dl  144 mg/dl  


 


White Blood Count    6.65 K/uL 


 


Red Blood Count    3.33 M/uL 


 


Hemoglobin    9.8 g/dL 


 


Hematocrit    28.6 % 


 


Mean Corpuscular Volume    85.9 fL 


 


Mean Corpuscular Hemoglobin    29.4 pg 


 


Mean Corpuscular Hemoglobin


Concent 


  


  


  34.3 g/dl 


 


 


Platelet Count    37 K/uL 


 


Mean Platelet Volume    10.2 fL 


 


Neutrophils (%) (Auto)    56.5 % 


 


Lymphocytes (%) (Auto)    22.7 % 


 


Monocytes (%) (Auto)    18.0 % 


 


Eosinophils (%) (Auto)    0.5 % 


 


Basophils (%) (Auto)    0.2 % 


 


Neutrophils # (Auto)    3.76 K/uL 


 


Lymphocytes # (Auto)    1.51 K/uL 


 


Monocytes # (Auto)    1.20 K/uL 


 


Eosinophils # (Auto)    0.03 K/uL 


 


Basophils # (Auto)    0.01 K/uL 


 


RDW Standard Deviation    54.9 fL 


 


RDW Coefficient of Variation    17.8 % 


 


Immature Granulocyte % (Auto)    2.1 % 


 


Immature Granulocyte # (Auto)    0.14 K/uL 


 


Sodium Level    140 mmol/L 


 


Potassium Level    4.1 mmol/L 


 


Chloride Level    110 mmol/L 


 


Carbon Dioxide Level    21 mmol/L 


 


Anion Gap    9.0 mmol/L 


 


Blood Urea Nitrogen    34 mg/dl 


 


Creatinine    2.20 mg/dl 


 


Est Creatinine Clear Calc


Drug Dose 


  


  


  26.7 ml/min 


 


 


Estimated GFR (


American) 


  


  


  31.2 


 


 


Estimated GFR (Non-


American 


  


  


  26.9 


 


 


BUN/Creatinine Ratio    15.4 


 


Random Glucose    186 mg/dl 


 


Calcium Level    8.1 mg/dl 


 


Chemistry Specimen Hemolysis     














Test


  5/10/17


11:31 5/10/17


12:45 


  


 


 


Bedside Glucose 223 mg/dl    











Assessment and Plan


Continued [facility] stay due to:  multiple IV medications needed, other (unstable H/H

)


Discharge planning:  home

## 2017-05-11 VITALS — SYSTOLIC BLOOD PRESSURE: 176 MMHG | DIASTOLIC BLOOD PRESSURE: 78 MMHG | HEART RATE: 54 BPM

## 2017-05-11 VITALS
DIASTOLIC BLOOD PRESSURE: 74 MMHG | HEART RATE: 56 BPM | TEMPERATURE: 98.24 F | SYSTOLIC BLOOD PRESSURE: 147 MMHG | OXYGEN SATURATION: 96 %

## 2017-05-11 VITALS
TEMPERATURE: 97.7 F | HEART RATE: 61 BPM | OXYGEN SATURATION: 98 % | SYSTOLIC BLOOD PRESSURE: 165 MMHG | DIASTOLIC BLOOD PRESSURE: 79 MMHG

## 2017-05-11 VITALS
TEMPERATURE: 97.34 F | SYSTOLIC BLOOD PRESSURE: 176 MMHG | HEART RATE: 58 BPM | DIASTOLIC BLOOD PRESSURE: 80 MMHG | OXYGEN SATURATION: 95 %

## 2017-05-11 VITALS
TEMPERATURE: 98.6 F | SYSTOLIC BLOOD PRESSURE: 139 MMHG | HEART RATE: 55 BPM | OXYGEN SATURATION: 98 % | DIASTOLIC BLOOD PRESSURE: 63 MMHG

## 2017-05-11 VITALS
TEMPERATURE: 97.52 F | OXYGEN SATURATION: 97 % | HEART RATE: 59 BPM | DIASTOLIC BLOOD PRESSURE: 87 MMHG | SYSTOLIC BLOOD PRESSURE: 203 MMHG

## 2017-05-11 VITALS
DIASTOLIC BLOOD PRESSURE: 75 MMHG | HEART RATE: 55 BPM | SYSTOLIC BLOOD PRESSURE: 158 MMHG | OXYGEN SATURATION: 97 % | TEMPERATURE: 97.7 F

## 2017-05-11 VITALS
SYSTOLIC BLOOD PRESSURE: 179 MMHG | OXYGEN SATURATION: 98 % | TEMPERATURE: 97.52 F | HEART RATE: 58 BPM | DIASTOLIC BLOOD PRESSURE: 80 MMHG

## 2017-05-11 VITALS — OXYGEN SATURATION: 96 %

## 2017-05-11 VITALS — DIASTOLIC BLOOD PRESSURE: 76 MMHG | HEART RATE: 54 BPM | SYSTOLIC BLOOD PRESSURE: 150 MMHG

## 2017-05-11 VITALS — OXYGEN SATURATION: 97 %

## 2017-05-11 LAB
ANION GAP SERPL CALC-SCNC: 9 MMOL/L (ref 3–11)
BASOPHILS # BLD: 0.01 K/UL (ref 0–0.2)
BASOPHILS NFR BLD: 0.2 %
BUN SERPL-MCNC: 30 MG/DL (ref 7–18)
BUN/CREAT SERPL: 14.1 (ref 10–20)
CALCIUM SERPL-MCNC: 8.8 MG/DL (ref 8.5–10.1)
CHLORIDE SERPL-SCNC: 109 MMOL/L (ref 98–107)
CO2 SERPL-SCNC: 23 MMOL/L (ref 21–32)
COMPLETE: YES
CREAT CL PREDICTED SERPL C-G-VRATE: 28 ML/MIN
CREAT SERPL-MCNC: 2.1 MG/DL (ref 0.6–1.4)
EOSINOPHIL NFR BLD AUTO: 81 K/UL (ref 130–400)
GLUCOSE SERPL-MCNC: 158 MG/DL (ref 70–99)
HCT VFR BLD CALC: 30.3 % (ref 42–52)
HCT VFR BLD CALC: 31.8 % (ref 42–52)
IG%: 2.3 %
IMM GRANULOCYTES NFR BLD AUTO: 17.7 %
LYMPHOCYTES # BLD: 0.99 K/UL (ref 1.2–3.4)
MCH RBC QN AUTO: 27.4 PG (ref 25–34)
MCHC RBC AUTO-ENTMCNC: 32.1 G/DL (ref 32–36)
MCV RBC AUTO: 85.5 FL (ref 80–100)
MONOCYTES NFR BLD: 36.9 %
NEUTROPHILS # BLD AUTO: 0.4 %
NEUTROPHILS NFR BLD AUTO: 42.5 %
PMV BLD AUTO: 11.1 FL (ref 7.4–10.4)
POTASSIUM SERPL-SCNC: 4.2 MMOL/L (ref 3.5–5.1)
RBC # BLD AUTO: 3.72 M/UL (ref 4.7–6.1)
SODIUM SERPL-SCNC: 141 MMOL/L (ref 136–145)
WBC # BLD AUTO: 5.58 K/UL (ref 4.8–10.8)

## 2017-05-11 RX ADMIN — INSULIN ASPART SCH UNITS: 100 INJECTION, SOLUTION INTRAVENOUS; SUBCUTANEOUS at 12:01

## 2017-05-11 RX ADMIN — Medication SCH MCG: at 07:39

## 2017-05-11 RX ADMIN — PANTOPRAZOLE SCH MG: 40 TABLET, DELAYED RELEASE ORAL at 20:25

## 2017-05-11 RX ADMIN — INSULIN ASPART SCH UNITS: 100 INJECTION, SOLUTION INTRAVENOUS; SUBCUTANEOUS at 16:57

## 2017-05-11 RX ADMIN — INSULIN GLARGINE SCH UNIT: 100 INJECTION, SOLUTION SUBCUTANEOUS at 07:42

## 2017-05-11 RX ADMIN — INSULIN ASPART SCH UNITS: 100 INJECTION, SOLUTION INTRAVENOUS; SUBCUTANEOUS at 07:41

## 2017-05-11 RX ADMIN — AMLODIPINE BESYLATE ONE MG: 5 TABLET ORAL at 16:24

## 2017-05-11 RX ADMIN — PANTOPRAZOLE SCH MG: 40 TABLET, DELAYED RELEASE ORAL at 07:39

## 2017-05-11 RX ADMIN — AMLODIPINE BESYLATE ONE MG: 5 TABLET ORAL at 16:17

## 2017-05-11 RX ADMIN — ATORVASTATIN CALCIUM SCH MG: 40 TABLET, FILM COATED ORAL at 07:39

## 2017-05-11 RX ADMIN — INSULIN ASPART SCH UNITS: 100 INJECTION, SOLUTION INTRAVENOUS; SUBCUTANEOUS at 20:24

## 2017-05-11 NOTE — MEDICAL STUDENT: [facility]
Med Student Progress Note


Date of Service


May 11, 2017.





Subjective


Pt evaluation today including:  conversation w/ patient, physical exam, chart 

review, lab review, review of studies, review of inpatient medication list


Pain:  patient denied any pain or discomfort


PO Intake:  Appetite great.  Cleans tray.  Denies any dysphagia


Voiding:  no voiding problems


Mr. Hummel is an 82 year old Male presenting with blood loss anemia and melena 

secondary to an upper GI bleed.  He feels well currently, denies any pain or 

discomfort.  Mr. Hummel denies headache, shortness of breath, lightheadedness, 

dizziness, and chest pain both at rest and ambulating to restroom.  He denied 

any difficulty urinating or moving his bowels.  Last BM was "middle of last 

night" and remained dark and tarry.





Review of Systems


Constitutional:  + weakness, No chills, No fever, No sweats


Eyes:  No worsening of vision


ENT:  No hearing loss, No trouble swallowing


Respiratory:  No dyspnea at rest, No dyspnea on exertion, No shortness of breath


Cardiac:  No chest pain, No claudication


Abdomen:  + GI bleeding, No constipation, No diarrhea, No nausea, No pain, No 

vomiting


Musculoskeletal:  No calf pain, No joint pain, No swelling


Male :  No dysuria, No incontinence


Neurologic:  No vertigo


Skin:  No itch, No new/changing skin lesions, No rash





Objective


Vital Signs











  Date Time  Temp Pulse Resp B/P Pulse Ox O2 Delivery O2 Flow Rate FiO2


 


5/11/17 07:50 36.8 56 18 147/74 96   


 


5/11/17 04:00     96 Room Air  


 


5/11/17 03:45 36.3 58 18 176/80 95 Room Air  


 


5/10/17 23:59     96 Room Air  


 


5/10/17 23:20 36.4 59 20 160/80 96 Room Air  


 


5/10/17 20:00     96 Room Air  


 


5/10/17 19:31 36.5 54 22 156/72 98 Room Air  


 


5/10/17 16:00     98 Room Air  


 


5/10/17 15:24 36.4 52 16 161/79 97   


 


5/10/17 15:18 36.6 53 22 165/75 96 Room Air  


 


5/10/17 15:00 36.4 55 12 178/75 98   


 


5/10/17 14:45 36.4 52 16 166/72 96   


 


5/10/17 14:30 36.4 52 16 153/72 96   


 


5/10/17 14:19 36.4 53 16 163/76 98   


 


5/10/17 14:14 36.4 51 16 179/78 97   


 


5/10/17 12:00     97 Room Air  


 


5/10/17 11:50 36.8 54 18 158/63 98   


 


5/10/17 08:29 36.8 60 18 166/81 100   











Physical Exam


General Appearance:  WD/WN, no apparent distress


Eyes:  right eye abnormal pupil


ENT:  normal ENT inspection, hearing grossly normal (Family claims hearing loss 

but no difficulting conversing bedside or across the room.  Nothing acute)


Respiratory/Chest:  chest non-tender, lungs clear, normal breath sounds, no 

respiratory distress


Cardiovascular:  no edema, no gallop, no murmur


Abdomen:  normal bowel sounds, non tender, soft, no organomegaly, no pulsatile 

mass


Extremities:  non-tender, no pedal edema, no calf tenderness


Neurologic/Psychiatric:  normal mood/affect, oriented x 3


Skin:  normal color, warm/dry, no rash, + pertinent finding (Small area of 

petechiae located around right clavicle.  Not appreciated anywhere else on exam)





Laboratory Results





Last 24 Hours








Test


  5/10/17


20:13 5/11/17


07:01 5/11/17


07:36 5/11/17


11:10


 


Bedside Glucose 166 mg/dl  157 mg/dl   204 mg/dl 


 


White Blood Count   5.58 K/uL  


 


Red Blood Count   3.72 M/uL  


 


Hemoglobin   10.2 g/dL  


 


Hematocrit   31.8 %  


 


Mean Corpuscular Volume   85.5 fL  


 


Mean Corpuscular Hemoglobin   27.4 pg  


 


Mean Corpuscular Hemoglobin


Concent 


  


  32.1 g/dl 


  


 


 


Platelet Count   81 K/uL  


 


Mean Platelet Volume   11.1 fL  


 


Neutrophils (%) (Auto)   42.5 %  


 


Lymphocytes (%) (Auto)   17.7 %  


 


Monocytes (%) (Auto)   36.9 %  


 


Eosinophils (%) (Auto)   0.4 %  


 


Basophils (%) (Auto)   0.2 %  


 


Neutrophils # (Auto)   2.37 K/uL  


 


Lymphocytes # (Auto)   0.99 K/uL  


 


Monocytes # (Auto)   2.06 K/uL  


 


Eosinophils # (Auto)   0.02 K/uL  


 


Basophils # (Auto)   0.01 K/uL  


 


RDW Standard Deviation   53.7 fL  


 


RDW Coefficient of Variation   17.8 %  


 


Immature Granulocyte % (Auto)   2.3 %  


 


Immature Granulocyte # (Auto)   0.13 K/uL  


 


Sodium Level   141 mmol/L  


 


Potassium Level   4.2 mmol/L  


 


Chloride Level   109 mmol/L  


 


Carbon Dioxide Level   23 mmol/L  


 


Anion Gap   9.0 mmol/L  


 


Blood Urea Nitrogen   30 mg/dl  


 


Creatinine   2.10 mg/dl  


 


Est Creatinine Clear Calc


Drug Dose 


  


  28.0 ml/min 


  


 


 


Estimated GFR (


American) 


  


  33.0 


  


 


 


Estimated GFR (Non-


American 


  


  28.5 


  


 


 


BUN/Creatinine Ratio   14.1  


 


Random Glucose   158 mg/dl  


 


Calcium Level   8.8 mg/dl  














Test


  5/11/17


16:04 5/11/17


16:14 


  


 


 


Hemoglobin 10.0 g/dL    


 


Hematocrit 30.3 %    


 


Bedside Glucose  140 mg/dl   











Medications





 Current Inpatient Medications








 Medications


  (Trade)  Dose


 Ordered  Sig/Mariposa


 Route  Start Time


 Stop Time Status Last Admin


Dose Admin


 


 Ondansetron HCl


  (Zofran Inj)  4 mg  Q6H  PRN


 IV  5/7/17 16:00


 6/6/17 15:59   


 


 


 Cyanocobalamin


  (Vitamin B-12


 Tab)  1,000 mcg  DAILY


 PO  5/8/17 09:00


 6/7/17 08:59  5/11/17 07:39


1,000 MCG


 


 Nitroglycerin


  (Nitrostat Tab)  0.4 mg  UD  PRN


 SL  5/7/17 16:00


 6/6/17 15:59   


 


 


 Glucose


  (Glucose 40% Gel)  15-30


 GRAMS 15


 GRAMS...  UD  PRN


 PO  5/7/17 16:15


 6/6/17 16:14   


 


 


 Glucose


  (Glucose Chew


 Tab)  4-8


 Tablets 4


 Tabl...  UD  PRN


 PO  5/7/17 16:15


 6/6/17 16:14   


 


 


 Dextrose


  (Dextrose 50%


 50ML Syringe)  25-50ML OF


 50% DW IV


 FOR...  UD  PRN


 IV  5/7/17 16:15


 6/6/17 16:14   


 


 


 Glucagon


  (Glucagon Inj)  1 mg  UD  PRN


 SQ  5/7/17 16:15


 6/6/17 16:14   


 


 


 Pantoprazole


 Sodium


  (Protonix Tab)  40 mg  BID


 PO  5/8/17 21:00


 6/7/17 20:59  5/11/17 07:39


40 MG


 


 Insulin Aspart


  (novoLOG ASPART)  SLIDING


 SCALE  ACHS


 SC  5/8/17 16:15


 6/7/17 16:14  5/11/17 16:57


6 UNITS


 


 Atorvastatin


 Calcium


  (Lipitor Tab)  40 mg  QAM


 PO  5/10/17 09:00


 6/9/17 08:59  5/11/17 07:39


40 MG


 


 Insulin Glargine


  (Lantus Solostar


 Pen)  16 unit  QAM


 SC  5/10/17 09:00


 6/9/17 08:59  5/11/17 07:42


16 UNIT


 


 Amlodipine


 Besylate


  (Norvasc Tab)  10 mg  DAILY


 PO  5/12/17 09:00


 6/11/17 08:59   


 











Assessment and Plan


Assessment and Plan:


82 year old male presenting with acute blood loss anemia and melena secondary 

to a GI bleed.





Melena with acute blood loss anemia


Clopidogrel is being held.  He has received 4 units to date of pRBC and bolus 

IV fluids and symptoms improved. Hgb increased to 9.8 and then to 10.2 on the 

subsequent measurement.  However Melena continues.  No active bleeding was 

found on EGD, but it did reveal angiotelectasia in the stomach and several 

duodenal erosions. A biopsy for H. pylori was taken and was negative for H. 

pylori infection.


-Because of persistent melena, repeat CBC.


-pentoprazole PO to help GI bleed


-F/u with GI for Outpatient capsule endoscopy


-If evidence of an active bleed exists, possibly perform RBC tagged nuclear 

scan to investigate source.





Thrombocytopenia


-Patient is followed by [facility] hem/onc. Has a previous history of low platelets 

but may have been related to MGUS.


-Platelet replacement if needed if the level continues to fall.


-Check for any spontaneous bleeding or bruising


-Recent blood smear reveals hyposegmented neutrophils, which can be indicator 

of MDS.  F/u with hem/onc 1-2 weeks to recheck this and thrombocytopenia.





H/o CAD


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 


-Hold off on taking Plavix until meeting with PCP, due to this acute history of 

GI bleed





Hypotension


Resolved


Resumed Furosemide and Amlodipine





Diabetes


Considering age and severity of comorbidities, a recent Hgb A1C of 7.7 reveals 

relatively well controlled diabetes.  Patient has experienced blood glucose 

fluctuations in the past including a hospital visit for hypoglycemia, so 

continue to educate the patient and family on diet and medication 

administration. 





Hyperlipidemia


Lipitor resumed





CKD stage IV


Creatinine stable at 2.1


-Continue to monitor Intake and Output as well as Creatinine levels


-Renally dose medications





DVT Prophylaxis


-SCDs


Continued [facility] stay due to:  abnormal vital signs, multiple IV medications 

needed, other

## 2017-05-11 NOTE — FAMILY MEDICINE PROGRESS NOTE
Progress Note


Date of Service


May 11, 2017.





Subjective


Pt evaluation today including:  conversation w/ patient, conversation w/ family

, physical exam, chart review, lab review


Patient feeling well currently, he denies any pain or discomfort. He denies SOB

, headaches, dizziness/lightheadedness, syncope or presyncopal episodes. He has 

not noticed any swelling in his face or extremities. He walked a bit after his 

transfusion yesterday evening, and said he did not experience any exertional 

symptoms. He did have another bowel movement with dark tarry stools yesterday 

evening. Nursing staff says there were some brown streaks in it.





   Constitutional:  No chills, No fever


   Respiratory:  No cough, No shortness of breath


   Cardiovascular:  No chest pain, No palpitations


   Abdomen:  + GI bleeding, No constipation, No diarrhea, No nausea, No pain, 

No vomiting


   Male :  No dysuria, No hematuria





Objective


Vital Signs











  Date Time  Temp Pulse Resp B/P Pulse Ox O2 Delivery O2 Flow Rate FiO2


 


5/11/17 16:06     97 Room Air  


 


5/11/17 15:47  54  176/78    


 


5/11/17 15:38 36.4 59 18 203/78 97 Room Air  





    202/87    


 


5/11/17 12:00     97 Room Air 0.0 


 


5/11/17 11:38 37.0 55 18 139/63 98   


 


5/11/17 08:00     96 Room Air 0.0 


 


5/11/17 07:50 36.8 56 18 147/74 96   


 


5/11/17 04:00     96 Room Air  


 


5/11/17 03:45 36.3 58 18 176/80 95 Room Air  


 


5/10/17 23:59     96 Room Air  


 


5/10/17 23:20 36.4 59 20 160/80 96 Room Air  


 


5/10/17 20:00     96 Room Air  


 


5/10/17 19:31 36.5 54 22 156/72 98 Room Air  











Physical Exam


General Appearance:  WD/WN, no apparent distress


Eyes:  normal inspection


ENT:  pharynx normal


Respiratory/Chest:  lungs clear, normal breath sounds, no respiratory distress, 

no accessory muscle use


Cardiovascular:  regular rate, rhythm, no murmur


Abdomen:  normal bowel sounds, non tender, soft


Extremities:  normal inspection, no pedal edema, no calf tenderness


Neurologic/Psychiatric:  alert, normal mood/affect, oriented x 3


Skin:  normal color, warm/dry, no rash





Laboratory Results





Results Past 24 Hours








Test


  5/11/17


07:01 5/11/17


07:36 5/11/17


11:10 5/11/17


16:04 Range/Units


 


 


Bedside Glucose 157  204  70-99  mg/dl


 


White Blood Count  5.58   4.8-10.8  K/uL


 


Red Blood Count  3.72   4.7-6.1  M/uL


 


Hemoglobin  10.2  10.0 14.0-18.0  g/dL


 


Hematocrit  31.8  30.3 42-52  %


 


Mean Corpuscular Volume  85.5     fL


 


Mean Corpuscular Hemoglobin  27.4   25-34  pg


 


Mean Corpuscular Hemoglobin


Concent 


  32.1


  


  


  32-36  g/dl


 


 


Platelet Count  81   130-400  K/uL


 


Mean Platelet Volume  11.1   7.4-10.4  fL


 


Neutrophils (%) (Auto)  42.5    %


 


Lymphocytes (%) (Auto)  17.7    %


 


Monocytes (%) (Auto)  36.9    %


 


Eosinophils (%) (Auto)  0.4    %


 


Basophils (%) (Auto)  0.2    %


 


Neutrophils # (Auto)  2.37   1.4-6.5  K/uL


 


Lymphocytes # (Auto)  0.99   1.2-3.4  K/uL


 


Monocytes # (Auto)  2.06   0.11-0.59  K/uL


 


Eosinophils # (Auto)  0.02   0-0.5  K/uL


 


Basophils # (Auto)  0.01   0-0.2  K/uL


 


RDW Standard Deviation  53.7   36.4-46.3  fL


 


RDW Coefficient of Variation  17.8   11.5-14.5  %


 


Immature Granulocyte % (Auto)  2.3    %


 


Immature Granulocyte # (Auto)  0.13   0.00-0.02  K/uL


 


Sodium Level  141   136-145  mmol/L


 


Potassium Level  4.2   3.5-5.1  mmol/L


 


Chloride Level  109     mmol/L


 


Carbon Dioxide Level  23   21-32  mmol/L


 


Anion Gap  9.0   3-11  mmol/L


 


Blood Urea Nitrogen  30   7-18  mg/dl


 


Creatinine


  


  2.10


  


  


  0.60-1.40


mg/dl


 


Est Creatinine Clear Calc


Drug Dose 


  28.0


  


  


   ml/min


 


 


Estimated GFR (


American) 


  33.0


  


  


   


 


 


Estimated GFR (Non-


American 


  28.5


  


  


   


 


 


BUN/Creatinine Ratio  14.1   10-20  


 


Random Glucose  158   70-99  mg/dl


 


Calcium Level  8.8   8.5-10.1  mg/dl














Test


  5/11/17


16:14 


  


  


  Range/Units


 


 


Bedside Glucose 140    70-99  mg/dl











Assessment and Plan


81 year old male presenting with severe anemia with melena and hypotension





Melena with acute blood loss anemia - ongoing melena. Bleeding source not 

confirmed. Patient asymptomatic currently. H/H stable


Plavix held on admission. 2 unit pRBC transfused on 5/7. Negative GI evaluation 

in 5/16. GI consult- recs appreciated. 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 

some duodenal erosions but no active bleeding. Argon coagulation applied. 

Biopsies for H.Pylori negative. 2 unit pRBC transfused on 5/9. Iron stores 

assessed and found to be largely WNL.





- Protonix 40mg BID


- Recheck CBC tomorrow AM


- As per GI, may proceed to nuclear med bleeding scan if ongoing bleeding


- Iron supplementation on discharge





Hypotension - BPs slightly elevated.


IVF bolus and mIVF given at admission, but since discontinued.


- Resume Norvasc 10mg daily


- Home Imdur and PRN Furosemide held





Thrombocytopenia with h/o IgG Anand MGUS - previously followed by [facility] 

hematology/oncology. Previous history of low platelets. Initial resuscitation 

with fluids and blood products, and monitoring of platelets. Hematology/

oncology consulted- recs appreciated. Required 1 unit platelet transfusion on  5

/10.


- Trend platelets on CBC


- Consider repeat platelet replacement if platelet level continues to fall or 

spontaneous bleeding/bruising


- Follow up with Dr. Pierce, oncologist 1-2 weeks post discharge





Leukocytosis - Resolved. Previously elevated at 17.28, but now back WNL. Likely 

secondary to acute stress given stable vitals. UA negative.





Diabetes - elevated glucose levels on lowered dose of Lantus for NPO status, 

now improved with slight dose increase


- Lantus 16 units qAM


- ISS





ZIGGY on CKD IV - Creatinine 2.8 on admission, now at baseline 2.2


- Monitor I/O


- Trend on BMP tomorrow





CAD - s/p CABG - on Plavix instead of aspirin given thrombocytopenia


- Lipitor 40mg daily 


- Plavix held





DVT Prophylaxis


- SCDs





Resuscitation status


- Full Resuscitation





Dispo


- PT/OT evals


Continued [facility] stay due to:  other (ongoing melena)


Discharge planning:  home





Resident Tracking


Resident Involvement:  Resident Care Provided


Care Provided:  [facility]





Reviewed:  Pt Seen/Exam by Me


History


Resident Physician Supervision Note:


I interviewed and examined the patient. Discussed with Dr. Betancourt and agree with 

findings and plan as documented in the note. Any exceptions or clarifications 

are listed here: 





Doing well, feels good, however had another big black tarry stool today.  

Hemoglobin remains stable and is actually higher than yesterday.





Vitals reviewed


NAD


RRR no mgr


CTAB no wcr


+BS soft NT ND no HSM


Ext no edema


Skin-some petechiae on the chest where he scratched himself yesterday





81 yo male with acute blood loss anemia from GI bleeding. EGD with possible 

previous source of bleeding from angioectasia in stomach and duodenal erosions.

  Hemoglobin dropped again to 7.5 and transfused 2 more units on 05/09/2017, 

hemoglobin up to 10 now today and stable despite recurrent melena which may be 

old blood.  Platelets dropped down to a isela of 37 but her now improved to 81,

000 after platelet transfusion.  Thrombocytopenia may be related to autoimmune 

process.  Peripheral smear showed evidence of MDS


-continue to follow CBC, hold Plavix for at least 1 week until seen by PCP and 

has stable CBC as an outpatient


--Consult hematology appreciated-follow up with hematology in the office in 1-2 

weeks is recommended


-continue PPI


-may need another capsule endoscopy as an outpatient


-Consider RBC tagged scan if has persistent evidence of bleeding-we'll hold off 

on this today as I think this is still old blood as H&H is stable


-Follow up CBC in the morning and if stable, will discharge to home





Documented By:  Alfreda Manriquez

## 2017-05-12 VITALS
TEMPERATURE: 97.52 F | OXYGEN SATURATION: 96 % | DIASTOLIC BLOOD PRESSURE: 80 MMHG | SYSTOLIC BLOOD PRESSURE: 153 MMHG | HEART RATE: 58 BPM

## 2017-05-12 VITALS
HEART RATE: 48 BPM | TEMPERATURE: 97.34 F | DIASTOLIC BLOOD PRESSURE: 78 MMHG | OXYGEN SATURATION: 95 % | SYSTOLIC BLOOD PRESSURE: 148 MMHG

## 2017-05-12 VITALS
DIASTOLIC BLOOD PRESSURE: 53 MMHG | HEART RATE: 58 BPM | TEMPERATURE: 97.88 F | OXYGEN SATURATION: 97 % | SYSTOLIC BLOOD PRESSURE: 98 MMHG

## 2017-05-12 VITALS
SYSTOLIC BLOOD PRESSURE: 98 MMHG | TEMPERATURE: 97.88 F | HEART RATE: 58 BPM | DIASTOLIC BLOOD PRESSURE: 53 MMHG | OXYGEN SATURATION: 97 %

## 2017-05-12 VITALS — SYSTOLIC BLOOD PRESSURE: 159 MMHG | HEART RATE: 61 BPM | DIASTOLIC BLOOD PRESSURE: 81 MMHG

## 2017-05-12 VITALS — OXYGEN SATURATION: 95 %

## 2017-05-12 VITALS
TEMPERATURE: 98.24 F | HEART RATE: 78 BPM | DIASTOLIC BLOOD PRESSURE: 67 MMHG | SYSTOLIC BLOOD PRESSURE: 138 MMHG | OXYGEN SATURATION: 97 %

## 2017-05-12 LAB
ANION GAP SERPL CALC-SCNC: 7 MMOL/L (ref 3–11)
BUN SERPL-MCNC: 26 MG/DL (ref 7–18)
BUN/CREAT SERPL: 12.6 (ref 10–20)
CALCIUM SERPL-MCNC: 8.4 MG/DL (ref 8.5–10.1)
CHLORIDE SERPL-SCNC: 110 MMOL/L (ref 98–107)
CO2 SERPL-SCNC: 25 MMOL/L (ref 21–32)
CREAT CL PREDICTED SERPL C-G-VRATE: 28 ML/MIN
CREAT SERPL-MCNC: 2.1 MG/DL (ref 0.6–1.4)
EOSINOPHIL NFR BLD AUTO: 93 K/UL (ref 130–400)
GLUCOSE SERPL-MCNC: 154 MG/DL (ref 70–99)
HCT VFR BLD CALC: 29.8 % (ref 42–52)
HCT VFR BLD CALC: 33.6 % (ref 42–52)
MCH RBC QN AUTO: 27.8 PG (ref 25–34)
MCHC RBC AUTO-ENTMCNC: 32.2 G/DL (ref 32–36)
MCV RBC AUTO: 86.4 FL (ref 80–100)
PMV BLD AUTO: 10.4 FL (ref 7.4–10.4)
POTASSIUM SERPL-SCNC: 4 MMOL/L (ref 3.5–5.1)
RBC # BLD AUTO: 3.45 M/UL (ref 4.7–6.1)
SODIUM SERPL-SCNC: 142 MMOL/L (ref 136–145)
WBC # BLD AUTO: 5.89 K/UL (ref 4.8–10.8)

## 2017-05-12 RX ADMIN — PANTOPRAZOLE SCH MG: 40 TABLET, DELAYED RELEASE ORAL at 08:16

## 2017-05-12 RX ADMIN — ATORVASTATIN CALCIUM SCH MG: 40 TABLET, FILM COATED ORAL at 08:16

## 2017-05-12 RX ADMIN — INSULIN ASPART SCH UNITS: 100 INJECTION, SOLUTION INTRAVENOUS; SUBCUTANEOUS at 08:20

## 2017-05-12 RX ADMIN — INSULIN GLARGINE SCH UNIT: 100 INJECTION, SOLUTION SUBCUTANEOUS at 08:19

## 2017-05-12 RX ADMIN — Medication SCH MCG: at 08:16

## 2017-05-12 RX ADMIN — INSULIN ASPART SCH UNITS: 100 INJECTION, SOLUTION INTRAVENOUS; SUBCUTANEOUS at 12:47

## 2017-05-12 NOTE — DISCHARGE INSTRUCTIONS
Discharge Instructions


Date of Service


May 12, 2017.





Admission


Reason for Admission:  Hypotension, Melena





Discharge


Discharge Diagnosis / Problem:  Acute blood loss from GI bleed, MGUS





Discharge Goals


Goal(s):  Decrease discomfort, Improve disease control, Diagnostic testing, 

Therapeutic intervention





Activity Recommendations


Activity Limitations:  resume your previous activity





.





Instructions / Follow-Up


Instructions / Follow-Up


You came to hospital feeling weak and your hemoglobin was 6.7. Your dark tarry 

stools were indicative of bleeding in the gut. Your Plavix was held to prevent 

worsening of the bleeding. You required transfusion of 4 units of red blood 

cells and 1 unit of platelets, which resulted in you feeling better and your 

labs appearing within normal limits. The gastroenterology (GI) and oncology 

doctors were consulted, and have been helping to take care of you.





At time of discharge, you are still having dark stool, but they do contain 

brown stool indicating probable resolution of bleeding. 


However, you will require close follow up to find the cause and hopefully 

prevent your levels from dropping again.





The GI doctors will follow up in office and do a repeat capsule camera test 

with you.


The oncology doctor (Dr. Pierce) will follow up with you and do more tests to 

make sure your blood levels remain steady.


Please see Dr. Lucas early next week. He may do a repeat blood check to 

assess your hemoglobin and ask you questions about the color of your stool. He 

will help you determine if it is safe to restart your Plavix or not. Please do 

not take the Plavix until a physician tells you that it is safe to do so. Once 

your stool becomes normal/brown in colour, he may also decide to put you on an 

iron supplement.





You may continue all other medications as they were prior to admission.





Please seek medical care by calling your PCP or GI doctor if you experience any 

recurrence of large volume of dark, tarry stool, if you start to feel dizzy/

lightheaded, or have any chest discomfort or shortness of breath, especially on 

minimal exertion.





Current Hospital Diet


Patient's current hospital diet: Diabetes Type 2 Diet





Discharge Diet


Recommended Diet:  Diabetes Type 2 Diet





Procedures


Procedures Performed:  


EGD





Pending Studies


Studies pending at discharge:  no





Laboratory Results





 Hemoglobin A1c








Test


  5/9/17


06:00 Range/Units


 


 


Estimated Average Glucose 174   mg/dl


 


Hemoglobin A1c 7.7 H 4.5-5.6  %











Medical Emergencies








.


Who to Call and When:





Medical Emergencies:  If at any time you feel your situation is an emergency, 

please call 911 immediately.





.





Non-Emergent Contact


Non-Emergency issues call your:  Primary Care Provider, Gastroenterologist, 

Oncologist





.


.





"Provider Documentation" section prepared by Rosario Betancourt.








.





VTE Core Measure


Inpt VTE Proph given/why not?:  SCD's

## 2017-05-12 NOTE — PROGRESS NOTE
DATE: 05/12/2017

 

SUBJECTIVE:  The patient is here for melena and anemia.  He has had an

essentially negative EGD and he has received a total of 4 units of blood. 

His hemoglobin today is 9.6, which is relatively stable.  He has had no

recorded bowel movements so far today.  Our plan is to continue to follow him

clinically and if he shows signs of more active bleeding or drop in his blood

count, then we would proceed with a tagged red blood scan, if he is stable

then will undergo a small bowel video capsule procedure as an outpatient, for

further evaluation of the small bowel for possible bleeding site.  Dr. Noriega will be covering for the weekend.

## 2017-05-12 NOTE — DISCHARGE SUMMARY
Discharge Summary


Date of Service


May 12, 2017.


 (Mechelle Betancourt MD)





Discharge Summary


Admission Date:


May 7, 2017 at 16:10


Discharge Date:  May 11, 2017


Discharge Disposition:  Home


Principal Diagnosis:  Acute blood loss from GI bleed, hypotension, MGUS


Immunizations:  


   Have You Had Influenza Vaccine:  Yes


   History of Tetanus Vaccine?:  Yes


   History of Pneumococcal:  Yes


   History of Hepatitis B Vaccine:  No


 (Mechelle Betancourt MD)


Problems/Secondary Diagnoses:


Thrombocytopenia 


IgG Anand MGUS 


Leukocytosis 


Diabetes mellitus II


ZIGGY on CKD IV 


CAD - s/p CABG


Procedures:


Chest xray


EGD


 (Alfreda Manriquez MD)





Medication Reconciliation


Changed Medications:  


Clopidogrel (Plavix) 75 Mg Tab


75 MG PO DAILY for 1 Day, #1 TAB (Medication details modified)


Do not take until told by your PCP to resume


Pantoprazole (Protonix) 40 Mg Tab


40 MG PO BID, #60 TAB (Changed from: DAILY; 30)





 


Continued Medications:  


Amlodipine (Norvasc) 10 Mg Tab


10 MG PO DAILY, TAB





Atorvastatin (Lipitor) 40 Mg Tab


40 MG PO DAILY, TAB





Cholecalciferol (Vitamin D3) 1,000 Unit Tab


1 TAB PO DAILY, TAB 3 Refills





Cyanocobalamin (Vitamin B-12 1000 Mcg) 1,000 Mcg Tab


1000 MCG PO DAILY, TAB





Furosemide (Furosemide) 20 Mg Tab


20 MG PO DAILY PRN for Fluid Retention





Insulin Glargine (Lantus Solostar) 100 Unit/Ml Inj


18-20 UNITS SQ QAM





Insulin Lispro (Human) (Humalog Kwikpen) 100 Unit/Ml Inj


8 UNITS SC TIDM


ADJUST AS DIRECTED BY SLIDING SCALE.


Isosorbide Mononitrate Ext Rel (Imdur Ext Rel) 120 Mg Ertab


120 MG PO QAM, TAB





Nitroglycerin (Nitrostat) 0.4 Mg Tab


0.4 MG SL UD PRN for Chest Pain


PLACE ONE TABLET UNDER THE TONGUE EVERY 5 MINUTES FOR UP TO 3 DOSES


 IF NEEDED FOR CHEST PAIN.








Discharge Exam


Patient says his stools have started to become more brown. His family is happy 

to hear that both his hemoglobin and platelets have remained stable. Patient is 

keen for home and understands the importance of close follow up, especially 

while etiology of GI bleeding is not fully determined.


Review of Systems:  


   Constitutional:  No chills, No fatigue, No fever, No sweats, No weakness


   Respiratory:  No cough, No shortness of breath, No wheezing


   Cardiovascular:  No chest pain, No edema, No palpitations


   Abdomen:  + GI bleeding, No nausea, No pain, No vomiting


   Genitourinary - Male:  No dysuria, No hematuria


   Hematologic / Lymphatic:  No abnormal bleeding/bruising


Physical Exam:  


   General Appearance:  WD/WN, no apparent distress


   Eyes:  normal inspection


   ENT:  hearing grossly normal, pharynx normal


   Neck:  supple, no adenopathy


   Respiratory/Chest:  lungs clear, normal breath sounds, no respiratory 

distress, no accessory muscle use


   Cardiovascular:  regular rate, rhythm, no murmur


   Abdomen / GI:  normal bowel sounds, non tender, soft


   Extremities:  normal inspection, no calf tenderness, no pedal edema


   Neurologic/Psychiatric:  alert, normal mood/affect, oriented x 3


   Skin:  normal color, warm/dry, no rash


 (Mechelle Betancourt MD)





Hospital Course


81 year old male presenting with severe anemia with melena and hypotension





Melena with acute blood loss anemia - ongoing melena. Bleeding source not 

confirmed. Patient asymptomatic currently. H/H stable


Plavix held on admission. 2 unit pRBC transfused on 5/7. Negative GI evaluation 

in 5/16. GI consult- recs appreciated. 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 

some duodenal erosions but no active bleeding. Argon coagulation applied. 

Biopsies for H.Pylori negative. 2 unit pRBC transfused on 5/9. Iron stores 

assessed and found to be largely WNL. On discharge:


- Continue Protonix 40mg BID


- Follow up with PCP early next week for CBC, and assessment of safety to 

resume Plavix and start iron supplementation


- Follow up with GI, for capsular camera test





Blood pressure


Hypotensive at admission requiring holding antihypertensive meds, IVF bolus and 

mIVF. Discontinued as blood pressures continue improve. Eventually resumed home 

medications. On discharge: 


- Resume Norvasc 10mg daily, Imdur 120mg daily and PRN Furosemide 20mg daily 

PRN weight gain





Thrombocytopenia with h/o IgG Anand MGUS 


Previously followed by [facility] hematology/oncology. Previous history of low 

platelets. Initial resuscitation with fluids and blood products, and monitoring 

of platelets. Hematology/oncology consulted- recs appreciated. Required 1 unit 

platelet transfusion on  5/10. On discharge, 


- Follow up with Dr. Pierce, oncologist 1-2 weeks for further work up





Leukocytosis - Resolved. Previously elevated at 17.28, but subsequently back 

WNL. Likely secondary to acute stress given stable vitals. UA negative.





Diabetes - elevated glucose levels on lowered dose of Lantus for NPO status, 

then improved with slight dose increase. Resume home dose of 18 units Lantus on 

discharge.





ZIGGY on CKD IV - Creatinine 2.8 on admission, and at baseline 2.2 on discharge. I

/Os and BMP monitored closely.





CAD - s/p CABG - on Plavix instead of aspirin given thrombocytopenia. However 

Plavix held since admission and to be resumed upon instruction of physician. 


- Continue Lipitor 40mg daily 





DVT Prophylaxis


- SCDs





Resuscitation status


- Full Resuscitation


Total Time Spent:  Greater than 30 minutes


This includes examination of the patient, discharge planning, medication 

reconciliation, and communication with other providers.


 (Mechelle Betancourt MD)





Discharge Instructions


Please refer to the electronic Patient Visit Report (Discharge Instructions) 

for additional information.


 (Mechelle Betancourt MD)





Resident Tracking


Resident Involvement:  Resident Care Provided


Care Provided:  [facility]


 (Mechelle Betancourt MD)





Reviewed:  Pt Seen/Exam by Me


 (Alfreda Manriquez MD)


History


Resident Physician Supervision Note:


I interviewed and examined the patient. Discussed with Dr. Betancourt and agree with 

findings and plan as documented in the note. Any exceptions or clarifications 

are listed here: 





Doing well, feels good,no further melena





Vitals reviewed


NAD


RRR no mgr


CTAB no wcr


+BS soft NT ND no HSM


Ext no edema


Skin-no rash





83 yo male with acute blood loss anemia from GI bleeding. EGD with possible 

previous source of bleeding from angioectasia in stomach and duodenal erosions.

  Hemoglobin dropped again to 7.5 and transfused 2 more units on 05/09/2017, 

hemoglobin up to 11 now today and stable.  Platelets dropped down to a isela of 

37 but now improved to 93,000 after platelet transfusion.  Thrombocytopenia may 

be related to autoimmune process.  Peripheral smear showed evidence of MDS


-continue to follow CBC, hold Plavix for at least 1 week until seen by PCP and 

has stable CBC as an outpatient


--Consult hematology appreciated-follow up with hematology in the office in 1-2 

weeks is recommended


-continue PPI


-may need another capsule endoscopy as an outpatient


-stable for dc to home





Documented By:  Alfreda Manriquez


 (Alfreda Manriquez MD)

## 2017-05-12 NOTE — HEME/ONC PROGRESS NOTE
DATE: 05/12/2017

 

DATE:  05/12/2017.

 

DIAGNOSES:

1. Melena stools, suspected gastrointestinal bleeding.

2. Anemia/thrombocytopenia.

3. Chronic renal insufficiency.

 

HOSPITAL COURSE:  Mr. Hummel  was seen and examined at bedside today.  He was

eating breakfast with a vigorous appetite and otherwise feels well.  Review

of his most recent labs confirm stability with hemoglobin, actually his

platelet count julissa a bit in the past 24 hours, which  again would strengthen

the argument that he may have an active antibody.  He is very anxious to go

home and from a hematologic standpoint, I see no reason why that cannot take

place today.

 

PHYSICAL EXAMINATION:

GENERAL:  He is in no acute distress.

VITAL SIGNS:  Temperature 36.3, pulse 48, respirations 20, blood pressure

148/78.

SKIN:  Without rash or lesion.

HEAD, EYES, EARS, NOSE, AND THROAT:  Oral mucosa without erythema or

ulceration.

NECK:  Supple.

HEART:  Regular rate and rhythm.

LUNGS:  Clear to auscultation.

ABDOMEN:  Soft, nontender, nondistended.

EXTREMITIES:  No clubbing, cyanosis or edema.

NEUROLOGIC EXAMINATION:  Grossly intact.

 

LABORATORY DATA:  WBC count 5890, hemoglobin 9.6, platelet count 93,000. 

Sodium 142, potassium 4.0, chloride 110, carbon dioxide 25, creatinine 2.1,

BUN 26.

 

IMPRESSION:  

1.  Gastrointestinal bleeding, source unclear.  

2.  Normocytic normochromic anemia secondary to renal insufficiency/chronic

disease.

3.  Thrombocytopenia, etiology unknown, suspect immune thrombocytopenia.

 

PLAN:  Mr. Hummel again was seen at bedside.  Clinically stable.  Review of

his most recent labs are for the most part unchanged.  From a hematologic

standpoint, I believe he could  be discharged today.  I would be more than

happy to review a CBC on Monday to ensure followup.  I understand his stools

are still melanotic.  Gastroenterology plans to carry out camera endoscopy as

outpatient.  I have nothing further to add and will make arrangements to see

Mr. Hummel as an outpatient.

 

Thank you very much for allowing me to participate in his care.

## 2017-05-12 NOTE — GASTROENTEROLOGY PROGRESS NOTE
DATE: 05/11/2017

 

DATE:  05/11/2017.  

 

HISTORY OF PRESENT ILLNESS:  The patient is sitting at the bedside eating

dinner accompanied by his daughter.  The patient is overall doing well.  The

patient reports he continues to experience  very dark appearing stools. 

Despite this his hemoglobin remains stable and at 1600 hours was 10.0.  His

hemoglobin had been as low as 6.9 on admission with a normal MCV.  His

platelet count  however is low and does have a history of thrombocytopenia. 

The patient did undergo upper endoscopy during this hospitalization for which

a 5 cm hiatal hernia was identified.  Mild Schatzki ring and single

nonbleeding AVM was seen in the gastric body, which was coagulated.  There

were small erosions without bleeding in the second portion of the duodenum. 

The patient has had evaluation by upper endoscopy and colonoscopy, by his

history today, in the past which was essentially unrevealing.

 

MEDICATIONS:  His medications were reviewed.  His medications currently

include  amlodipine, atorvastatin, insulin, pantoprazole 40 mg p.o. b.i.d.

and Zofran p.r.n.

 

PHYSICAL EXAMINATION:

VITAL SIGNS:  This afternoon, blood pressure 176/78, heart rate 54, afebrile

36.4, 97% on room air.

GENERAL:  Physical exam unremarkable.

HEAD, EYES, EARS, NOSE, AND THROAT:  Sclerae are anicteric, conjunctiva

moist.  Oral mucosa moist.

ABDOMEN:  Soft, nontender, without rebound or guarding.

EXTREMITIES:  Without edema.

RECTAL:  Deferred.

 

IMPRESSION:  The patient with melena of unclear etiology although hemoglobin

remains stable.  He did receive blood transfusions a total of 4 units

transfused, 2 units  on 05/07/2017 and 2 units on 05/09/2017.  He has had no

further transfusions since that time and hemoglobin seems to be stable at

this point despite his black stools.  I believe that it is reasonable to

continue to observe.  If the stool color or volume intensifies to suggest

ongoing bleeding or if his BUN and creatinine were to further increase then 

I would favor a tagged red blood cell scan to help localize a region of the

GI tract for the origin of blood loss.  If this seems stabilized, we can

arrange for an outpatient video capsule small bowel endoscopy through our

office.  All questions answered for the patient and his daughter.  We will

continue to follow throughout the patient's hospitalization.  All questions

answered to their satisfaction.

## 2017-05-27 ENCOUNTER — HOSPITAL ENCOUNTER (OUTPATIENT)
Dept: HOSPITAL 45 - C.LABPVFM | Age: 82
Discharge: HOME | End: 2017-05-27
Attending: INTERNAL MEDICINE
Payer: COMMERCIAL

## 2017-05-27 DIAGNOSIS — Z87.19: Primary | ICD-10-CM

## 2017-05-27 DIAGNOSIS — I10: ICD-10-CM

## 2017-05-27 LAB
ANION GAP SERPL CALC-SCNC: 9 MMOL/L (ref 3–11)
BASOPHILS # BLD: 0.02 K/UL (ref 0–0.2)
BASOPHILS NFR BLD: 0.4 %
BUN SERPL-MCNC: 30 MG/DL (ref 7–18)
BUN/CREAT SERPL: 12.1 (ref 10–20)
CALCIUM SERPL-MCNC: 8.5 MG/DL (ref 8.5–10.1)
CHLORIDE SERPL-SCNC: 108 MMOL/L (ref 98–107)
CO2 SERPL-SCNC: 23 MMOL/L (ref 21–32)
COMPLETE: YES
CREAT SERPL-MCNC: 2.5 MG/DL (ref 0.6–1.4)
EOSINOPHIL NFR BLD AUTO: 67 K/UL (ref 130–400)
GLUCOSE SERPL-MCNC: 240 MG/DL (ref 70–99)
HCT VFR BLD CALC: 31.9 % (ref 42–52)
IG%: 1.1 %
IMM GRANULOCYTES NFR BLD AUTO: 23.2 %
LYMPHOCYTES # BLD: 1.21 K/UL (ref 1.2–3.4)
MCH RBC QN AUTO: 27.2 PG (ref 25–34)
MCHC RBC AUTO-ENTMCNC: 31 G/DL (ref 32–36)
MCV RBC AUTO: 87.6 FL (ref 80–100)
MONOCYTES NFR BLD: 29.1 %
NEUTROPHILS # BLD AUTO: 0.6 %
NEUTROPHILS NFR BLD AUTO: 45.6 %
PHOSPHATE SERPL-MCNC: 3.4 MG/DL (ref 2.5–4.9)
POTASSIUM SERPL-SCNC: 4.6 MMOL/L (ref 3.5–5.1)
RBC # BLD AUTO: 3.64 M/UL (ref 4.7–6.1)
SODIUM SERPL-SCNC: 140 MMOL/L (ref 136–145)
WBC # BLD AUTO: 5.22 K/UL (ref 4.8–10.8)

## 2017-06-17 ENCOUNTER — HOSPITAL ENCOUNTER (OUTPATIENT)
Dept: HOSPITAL 45 - C.LABPVFM | Age: 82
Discharge: HOME | End: 2017-06-17
Attending: INTERNAL MEDICINE
Payer: COMMERCIAL

## 2017-06-17 DIAGNOSIS — D64.9: Primary | ICD-10-CM

## 2017-06-17 LAB
ALBUMIN/GLOB SERPL: 0.8 {RATIO} (ref 0.9–2)
ALP SERPL-CCNC: 62 U/L (ref 45–117)
ALT SERPL-CCNC: 15 U/L (ref 12–78)
ANION GAP SERPL CALC-SCNC: 9 MMOL/L (ref 3–11)
AST SERPL-CCNC: 10 U/L (ref 15–37)
BUN SERPL-MCNC: 33 MG/DL (ref 7–18)
BUN/CREAT SERPL: 11.9 (ref 10–20)
CALCIUM SERPL-MCNC: 8.1 MG/DL (ref 8.5–10.1)
CHLORIDE SERPL-SCNC: 108 MMOL/L (ref 98–107)
CO2 SERPL-SCNC: 24 MMOL/L (ref 21–32)
COMPLETE: YES
CREAT SERPL-MCNC: 2.8 MG/DL (ref 0.6–1.4)
EOSINOPHIL NFR BLD AUTO: 100 K/UL (ref 130–400)
EOSINOPHIL NFR BLD: 2 %
GLOBULIN SER-MCNC: 4.1 GM/DL (ref 2.5–4)
GLUCOSE SERPL-MCNC: 256 MG/DL (ref 70–99)
HCT VFR BLD CALC: 33.1 % (ref 42–52)
LYMPHOCYTES # BLD: 1.69 K/UL (ref 1.2–3.4)
LYMPHOCYTES NFR BLD: 15 %
MAGNESIUM SERPL-MCNC: 1.8 MG/DL (ref 1.8–2.4)
MCH RBC QN AUTO: 27.2 PG (ref 25–34)
MCHC RBC AUTO-ENTMCNC: 31.1 G/DL (ref 32–36)
MCV RBC AUTO: 87.6 FL (ref 80–100)
MYELOCYTES NFR BLD: 4 %
NEUTROPHILS NFR BLD AUTO: 70 %
PHOSPHATE SERPL-MCNC: 3.8 MG/DL (ref 2.5–4.9)
PMV BLD AUTO: 11.2 FL (ref 7.4–10.4)
POTASSIUM SERPL-SCNC: 4.5 MMOL/L (ref 3.5–5.1)
RBC # BLD AUTO: 3.78 M/UL (ref 4.7–6.1)
SODIUM SERPL-SCNC: 141 MMOL/L (ref 136–145)
WBC # BLD AUTO: 11.29 K/UL (ref 4.8–10.8)

## 2017-06-30 ENCOUNTER — HOSPITAL ENCOUNTER (OUTPATIENT)
Dept: HOSPITAL 45 - C.LABPVFM | Age: 82
Discharge: HOME | End: 2017-06-30
Attending: INTERNAL MEDICINE
Payer: COMMERCIAL

## 2017-06-30 DIAGNOSIS — D64.9: Primary | ICD-10-CM

## 2017-06-30 DIAGNOSIS — N18.4: ICD-10-CM

## 2017-06-30 LAB
ANION GAP SERPL CALC-SCNC: 9 MMOL/L (ref 3–11)
ANISOCYTOSIS BLD QL SMEAR: PRESENT
BUN SERPL-MCNC: 30 MG/DL (ref 7–18)
BUN/CREAT SERPL: 10.9 (ref 10–20)
CALCIUM SERPL-MCNC: 8.7 MG/DL (ref 8.5–10.1)
CHLORIDE SERPL-SCNC: 107 MMOL/L (ref 98–107)
CO2 SERPL-SCNC: 21 MMOL/L (ref 21–32)
COMPLETE: YES
CREAT SERPL-MCNC: 2.7 MG/DL (ref 0.6–1.4)
EOSINOPHIL NFR BLD AUTO: 152 K/UL (ref 130–400)
GIANT PLATELETS BLD QL SMEAR: (no result)
GLUCOSE SERPL-MCNC: 286 MG/DL (ref 70–99)
HCT VFR BLD CALC: 31.3 % (ref 42–52)
LYMPHOCYTES # BLD: 1.4 K/UL (ref 1.2–3.4)
LYMPHOCYTES NFR BLD: 11.3 %
MAGNESIUM SERPL-MCNC: 1.6 MG/DL (ref 1.8–2.4)
MCH RBC QN AUTO: 27.7 PG (ref 25–34)
MCHC RBC AUTO-ENTMCNC: 31.9 G/DL (ref 32–36)
MCV RBC AUTO: 86.7 FL (ref 80–100)
MYELOCYTES NFR BLD: 0.9 %
NEUTROPHILS NFR BLD AUTO: 66.1 %
PHOSPHATE SERPL-MCNC: 3.1 MG/DL (ref 2.5–4.9)
POLYCHROMASIA BLD QL SMEAR: (no result)
POTASSIUM SERPL-SCNC: 4.5 MMOL/L (ref 3.5–5.1)
RBC # BLD AUTO: 3.61 M/UL (ref 4.7–6.1)
SODIUM SERPL-SCNC: 137 MMOL/L (ref 136–145)
WBC # BLD AUTO: 12.36 K/UL (ref 4.8–10.8)

## 2017-07-15 ENCOUNTER — HOSPITAL ENCOUNTER (OUTPATIENT)
Dept: HOSPITAL 45 - C.LABPVFM | Age: 82
Discharge: HOME | End: 2017-07-15
Attending: INTERNAL MEDICINE
Payer: COMMERCIAL

## 2017-07-15 DIAGNOSIS — D64.9: Primary | ICD-10-CM

## 2017-07-15 LAB
ANISOCYTOSIS BLD QL SMEAR: PRESENT
COMPLETE: YES
EOSINOPHIL NFR BLD AUTO: 98 K/UL (ref 130–400)
EOSINOPHIL NFR BLD: 3 %
HCT VFR BLD CALC: 31 % (ref 42–52)
LYMPHOCYTES # BLD: 0.67 K/UL (ref 1.2–3.4)
LYMPHOCYTES NFR BLD: 9 %
MCH RBC QN AUTO: 27.3 PG (ref 25–34)
MCHC RBC AUTO-ENTMCNC: 31.9 G/DL (ref 32–36)
MCV RBC AUTO: 85.4 FL (ref 80–100)
NEUTROPHILS NFR BLD AUTO: 74 %
PLATELET # BLD EST: (no result) 10*3/UL
RBC # BLD AUTO: 3.63 M/UL (ref 4.7–6.1)
VARIANT LYM ABS #: 0.15 K/UL
VARIANT LYMPHOCYTE %: 2 %
WBC # BLD AUTO: 7.49 K/UL (ref 4.8–10.8)

## 2017-09-02 ENCOUNTER — HOSPITAL ENCOUNTER (OUTPATIENT)
Dept: HOSPITAL 45 - C.LABPVFM | Age: 82
Discharge: HOME | End: 2017-09-02
Attending: INTERNAL MEDICINE
Payer: COMMERCIAL

## 2017-09-02 DIAGNOSIS — N18.4: Primary | ICD-10-CM

## 2017-09-02 LAB
ANION GAP SERPL CALC-SCNC: 7 MMOL/L (ref 3–11)
APPEARANCE UR: CLEAR
BILIRUB UR-MCNC: (no result) MG/DL
BUN SERPL-MCNC: 29 MG/DL (ref 7–18)
BUN/CREAT SERPL: 11.9 (ref 10–20)
CALCIUM SERPL-MCNC: 8.5 MG/DL (ref 8.5–10.1)
CHLORIDE SERPL-SCNC: 108 MMOL/L (ref 98–107)
CO2 SERPL-SCNC: 26 MMOL/L (ref 21–32)
COLOR UR: YELLOW
CREAT SERPL-MCNC: 2.4 MG/DL (ref 0.6–1.4)
CREAT UR-MCNC: 170 MG/DL
GLUCOSE SERPL-MCNC: 137 MG/DL (ref 70–99)
MAGNESIUM SERPL-MCNC: 1.6 MG/DL (ref 1.8–2.4)
MANUAL MICROSCOPIC REQUIRED?: NO
NITRITE UR QL STRIP: (no result)
PH UR STRIP: 5.5 [PH] (ref 4.5–7.5)
PHOSPHATE SERPL-MCNC: 4.1 MG/DL (ref 2.5–4.9)
POTASSIUM SERPL-SCNC: 4 MMOL/L (ref 3.5–5.1)
PROT UR STRIP-MCNC: 89.3 MG/DL (ref 0–11.9)
REVIEW REQ?: NO
SODIUM SERPL-SCNC: 141 MMOL/L (ref 136–145)
SP GR UR STRIP: 1.02 (ref 1–1.03)
URINE EPITHELIAL CELL AUTO: >30 /LPF (ref 0–5)
URINE PROTIEN/CREAT RATIO: 0.5 (ref 0–0.2)
UROBILINOGEN UR-MCNC: (no result) MG/DL
ZZUR CULT IF INDIC CLEAN CATCH: NO

## 2017-10-21 ENCOUNTER — HOSPITAL ENCOUNTER (OUTPATIENT)
Dept: HOSPITAL 45 - C.LABPVFM | Age: 82
Discharge: HOME | End: 2017-10-21
Attending: INTERNAL MEDICINE
Payer: COMMERCIAL

## 2017-10-21 DIAGNOSIS — D64.9: Primary | ICD-10-CM

## 2017-10-21 DIAGNOSIS — D69.6: ICD-10-CM

## 2017-10-21 DIAGNOSIS — E11.9: ICD-10-CM

## 2017-10-21 LAB
BASOPHILS # BLD: 0.01 K/UL (ref 0–0.2)
BASOPHILS NFR BLD: 0.2 %
COMPLETE: YES
EOSINOPHIL NFR BLD AUTO: 64 K/UL (ref 130–400)
EST. AVERAGE GLUCOSE BLD GHB EST-MCNC: 212 MG/DL
HCT VFR BLD CALC: 34.3 % (ref 42–52)
IG%: 1.9 %
IMM GRANULOCYTES NFR BLD AUTO: 25.9 %
LYMPHOCYTES # BLD: 1.49 K/UL (ref 1.2–3.4)
MCH RBC QN AUTO: 28.1 PG (ref 25–34)
MCHC RBC AUTO-ENTMCNC: 32.1 G/DL (ref 32–36)
MCV RBC AUTO: 87.7 FL (ref 80–100)
MONOCYTES NFR BLD: 27.7 %
NEUTROPHILS # BLD AUTO: 0.9 %
NEUTROPHILS NFR BLD AUTO: 43.4 %
PLATELET # BLD EST: (no result) 10*3/UL
RBC # BLD AUTO: 3.91 M/UL (ref 4.7–6.1)
WBC # BLD AUTO: 5.75 K/UL (ref 4.8–10.8)

## 2017-10-26 NOTE — CODING QUERY MEDICAL NECESSITY
SUPPORTING DIAGNOSIS NEEDED



Dr. Lucas,



A supporting diagnosis is required for the test/procedure performed on this patient in 
order for us to be reimbursed by the patient's insurance. Please provide a supporting 
diagnosis for the following test/procedure listed below next to the test name along with 
your signature. 



*If there is no additional diagnosis for this patient that would support the following 
test/procedure please document that below next to the test/procedure.



Test(s)/Procedure(s) that require a supporting diagnosis:





* [id number] GLYCATED HEMOGLOBIN            DIAGNOSIS:





***DATE OF SERVICE: 10/21/17***





Provider Signature:  ______________________________  Date:  _______



Thank you  

Jesus Batres

[facility]

Phone:  [phone]

Fax:  [phone]



Once completed, please kindly fax back to [phone]



For questions please call [phone]

## 2017-12-02 ENCOUNTER — HOSPITAL ENCOUNTER (OUTPATIENT)
Dept: HOSPITAL 45 - C.LABPVFM | Age: 82
Discharge: HOME | End: 2017-12-02
Attending: INTERNAL MEDICINE
Payer: COMMERCIAL

## 2017-12-02 DIAGNOSIS — N18.4: Primary | ICD-10-CM

## 2017-12-02 LAB
ANION GAP SERPL CALC-SCNC: 6 MMOL/L (ref 3–11)
ANISOCYTOSIS BLD QL SMEAR: PRESENT
APPEARANCE UR: CLEAR
BASOPHILS # BLD: 0.02 K/UL (ref 0–0.2)
BASOPHILS NFR BLD: 0.2 %
BILIRUB UR-MCNC: (no result) MG/DL
BUN SERPL-MCNC: 24 MG/DL (ref 7–18)
BUN/CREAT SERPL: 9.9 (ref 10–20)
CALCIUM SERPL-MCNC: 8.6 MG/DL (ref 8.5–10.1)
CHLORIDE SERPL-SCNC: 102 MMOL/L (ref 98–107)
CO2 SERPL-SCNC: 27 MMOL/L (ref 21–32)
COLOR UR: YELLOW
COMPLETE: YES
CREAT SERPL-MCNC: 2.41 MG/DL (ref 0.6–1.4)
CREAT UR-MCNC: 138 MG/DL
EOSINOPHIL NFR BLD AUTO: 159 K/UL (ref 130–400)
GLUCOSE SERPL-MCNC: 257 MG/DL (ref 70–99)
HCT VFR BLD CALC: 33.8 % (ref 42–52)
IG%: 3.9 %
IMM GRANULOCYTES NFR BLD AUTO: 14.9 %
LG PLATELETS BLD QL SMEAR: (no result)
LYMPHOCYTES # BLD: 1.52 K/UL (ref 1.2–3.4)
MAGNESIUM SERPL-MCNC: 1.6 MG/DL (ref 1.8–2.4)
MANUAL MICROSCOPIC REQUIRED?: NO
MCH RBC QN AUTO: 28.2 PG (ref 25–34)
MCHC RBC AUTO-ENTMCNC: 31.7 G/DL (ref 32–36)
MCV RBC AUTO: 89.2 FL (ref 80–100)
MONOCYTES NFR BLD: 27.7 %
NEUTROPHILS # BLD AUTO: 0.7 %
NEUTROPHILS NFR BLD AUTO: 52.6 %
NITRITE UR QL STRIP: (no result)
PH UR STRIP: 6.5 [PH] (ref 4.5–7.5)
PHOSPHATE SERPL-MCNC: 3.8 MG/DL (ref 2.5–4.9)
POTASSIUM SERPL-SCNC: 4.2 MMOL/L (ref 3.5–5.1)
PROT UR STRIP-MCNC: 109.2 MG/DL (ref 0–11.9)
RBC # BLD AUTO: 3.79 M/UL (ref 4.7–6.1)
REVIEW REQ?: NO
SODIUM SERPL-SCNC: 135 MMOL/L (ref 136–145)
SP GR UR STRIP: 1.02 (ref 1–1.03)
URINE PROTIEN/CREAT RATIO: 0.8 (ref 0–0.2)
UROBILINOGEN UR-MCNC: (no result) MG/DL
WBC # BLD AUTO: 10.23 K/UL (ref 4.8–10.8)
ZZUR CULT IF INDIC CLEAN CATCH: NO

## 2018-02-20 ENCOUNTER — HOSPITAL ENCOUNTER (OUTPATIENT)
Dept: HOSPITAL 45 - C.LABPVFM | Age: 83
Discharge: HOME | End: 2018-02-20
Attending: INTERNAL MEDICINE
Payer: COMMERCIAL

## 2018-02-20 DIAGNOSIS — N18.4: Primary | ICD-10-CM

## 2018-02-20 DIAGNOSIS — D63.8: ICD-10-CM

## 2018-02-20 LAB
ALBUMIN SERPL-MCNC: 3.2 GM/DL (ref 3.4–5)
ALP SERPL-CCNC: 79 U/L (ref 45–117)
ALT SERPL-CCNC: 14 U/L (ref 12–78)
AST SERPL-CCNC: 12 U/L (ref 15–37)
BASOPHILS # BLD: 0.03 K/UL (ref 0–0.2)
BASOPHILS NFR BLD: 0.5 %
BUN SERPL-MCNC: 26 MG/DL (ref 7–18)
CALCIUM SERPL-MCNC: 8.8 MG/DL (ref 8.5–10.1)
CO2 SERPL-SCNC: 22 MMOL/L (ref 21–32)
CREAT SERPL-MCNC: 2.64 MG/DL (ref 0.6–1.4)
EOS ABS #: 0.06 K/UL (ref 0–0.5)
EOSINOPHIL NFR BLD AUTO: 76 K/UL (ref 130–400)
GLUCOSE SERPL-MCNC: 197 MG/DL (ref 70–99)
HCT VFR BLD CALC: 32.7 % (ref 42–52)
HGB BLD-MCNC: 10.4 G/DL (ref 14–18)
IG#: 0.07 K/UL (ref 0–0.02)
IMM GRANULOCYTES NFR BLD AUTO: 20.5 %
LYMPHOCYTES # BLD: 1.25 K/UL (ref 1.2–3.4)
MCH RBC QN AUTO: 28.6 PG (ref 25–34)
MCHC RBC AUTO-ENTMCNC: 31.8 G/DL (ref 32–36)
MCV RBC AUTO: 89.8 FL (ref 80–100)
MONO ABS #: 2.1 K/UL (ref 0.11–0.59)
MONOCYTES NFR BLD: 34.4 %
NEUT ABS #: 2.59 K/UL (ref 1.4–6.5)
NEUTROPHILS # BLD AUTO: 1 %
NEUTROPHILS NFR BLD AUTO: 42.5 %
POTASSIUM SERPL-SCNC: 4.3 MMOL/L (ref 3.5–5.1)
PROT SERPL-MCNC: 7.6 GM/DL (ref 6.4–8.2)
RED CELL DISTRIBUTION WIDTH CV: 19.9 % (ref 11.5–14.5)
RED CELL DISTRIBUTION WIDTH SD: 64.9 FL (ref 36.4–46.3)
RETIC COUNT %: 1.7 % (ref 0.5–2)
SODIUM SERPL-SCNC: 140 MMOL/L (ref 136–145)
WBC # BLD AUTO: 6.1 K/UL (ref 4.8–10.8)

## 2018-05-08 ENCOUNTER — HOSPITAL ENCOUNTER (OUTPATIENT)
Dept: HOSPITAL 45 - C.LABPVFM | Age: 83
Discharge: HOME | End: 2018-05-08
Attending: INTERNAL MEDICINE
Payer: COMMERCIAL

## 2018-05-08 DIAGNOSIS — E11.22: Primary | ICD-10-CM

## 2018-05-08 DIAGNOSIS — E78.5: ICD-10-CM

## 2018-05-08 LAB
ALBUMIN SERPL-MCNC: 3.3 GM/DL (ref 3.4–5)
ALP SERPL-CCNC: 70 U/L (ref 45–117)
ALT SERPL-CCNC: 15 U/L (ref 12–78)
AST SERPL-CCNC: 16 U/L (ref 15–37)
BUN SERPL-MCNC: 29 MG/DL (ref 7–18)
CALCIUM SERPL-MCNC: 8.8 MG/DL (ref 8.5–10.1)
CO2 SERPL-SCNC: 25 MMOL/L (ref 21–32)
CREAT SERPL-MCNC: 2.5 MG/DL (ref 0.6–1.4)
GLUCOSE SERPL-MCNC: 164 MG/DL (ref 70–99)
HBA1C MFR BLD: 8.6 % (ref 4.5–5.6)
POTASSIUM SERPL-SCNC: 4.8 MMOL/L (ref 3.5–5.1)
PROT SERPL-MCNC: 7.4 GM/DL (ref 6.4–8.2)
SODIUM SERPL-SCNC: 139 MMOL/L (ref 136–145)

## 2019-06-27 NOTE — DIAGNOSTIC IMAGING REPORT
SINGLE VIEW CHEST



CLINICAL HISTORY:  Dyspnea.



FINDINGS: 2 AP, portable, upright chest radiographs are compared to study dated

1/20/2017 and correlated with chest CT dated 4/28/2014. The examination is

degraded by portable technique and patient rotation.  The patient is status post

midline sternotomy. The heart is mildly enlarged and there is atherosclerotic

calcification of the thoracic aorta. The pulmonary vasculature is noncongested.

Chronic elevation of the right hemidiaphragm with right basilar atelectasis is

similar to previous. No airspace consolidation is seen typical for pneumonia and

there is no large pleural effusion. Emphysema is suspected. No pneumothorax is

seen. The skeletal structures are osteopenic. The bony thorax is grossly intact.



IMPRESSION: Cardiomegaly with no acute cardiopulmonary abnormality. There is

been no significant change from yesterday's examination.







Electronically signed by:  Juan Pablo Ellis M.D.

1/21/2017 2:35 PM



Dictated Date/Time:  1/21/2017 2:34 PM -3 intracranial lesions noted, including 4.8 cm right posterior frontal / parietal lobe, 0.9 cm high right frontal lobe, and 0.5 cm left occipital lobe.  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  -Agree with continue dexamethasone.  -Options may include resection of dominant lesion followed by post-op SRS, versus whole brain radiation if no surgery planned.  Would not recommend SRS alone given large size and location of lesion.  Acknowledge location of lesion may also preclude resection, but defer to Dr. Talbot.